# Patient Record
Sex: FEMALE | Race: WHITE | NOT HISPANIC OR LATINO | Employment: OTHER | URBAN - METROPOLITAN AREA
[De-identification: names, ages, dates, MRNs, and addresses within clinical notes are randomized per-mention and may not be internally consistent; named-entity substitution may affect disease eponyms.]

---

## 2022-02-21 ENCOUNTER — TRANSFERRED RECORDS (OUTPATIENT)
Dept: HEALTH INFORMATION MANAGEMENT | Facility: CLINIC | Age: 72
End: 2022-02-21
Payer: COMMERCIAL

## 2022-02-22 NOTE — TELEPHONE ENCOUNTER
FUTURE VISIT INFORMATION      FUTURE VISIT INFORMATION:    Date: 3/22/22    Time: 12:45pm    [place] [facility] [place]:    Referring providers clinic:  [facility]    Reason for visit/diagnosis  sebaceous carcinoma    RECORDS REQUESTED FROM:       Clinic name Comments Records Status Imaging Status   [facility] Request for recs sent 2/22- recs received and sent to [facility] OV/notes 10/5/16-4/16/21  Excisional biopsy left eyelid done./ PAth report 3/1/21 Care Everywhere

## 2022-02-25 ENCOUNTER — TELEPHONE (OUTPATIENT)
Dept: OPHTHALMOLOGY | Facility: CLINIC | Age: 72
End: 2022-02-25
Payer: COMMERCIAL

## 2022-02-25 NOTE — TELEPHONE ENCOUNTER
Spoke with patient regarding scheduling for a sooner appointment in the next couple weeks in a new or JAY spot with either Provider. Patient declined scheduling as offered as she needs one week to arrange transportation and cannot make a 7:45am appointment work. Patient is aware of a call on Monday if I can find another option for patient.-Per Patient

## 2022-02-25 NOTE — TELEPHONE ENCOUNTER
Spoke with patient regarding scheduling for a sooner appointment in the next couple weeks in a new or JAY spot with either Provider. Patient declined scheduling as offered as she needs one week to arrange transportation and cannot make a 7:45am appointment work. Scheduled patient at [facility] on 3/7/22 in the afternoon and kept original appointment in place as well due to patient still has to arrange Transportation. Patient will call on Monday if patient can make appointment on 3/7/22 work. Patient has direct number for call back.-Per Patient

## 2022-02-28 ENCOUNTER — TELEPHONE (OUTPATIENT)
Dept: OPHTHALMOLOGY | Facility: CLINIC | Age: 72
End: 2022-02-28
Payer: COMMERCIAL

## 2022-02-28 NOTE — TELEPHONE ENCOUNTER
Patient called to confirm date for Eye Appointment and possible Biopsy. Patient kept appointment on 3/22/22 due to Biopsy cannot be done at Peds Clinic after RN for Eye Clinic reviewed notes. Peds Appointment was cancelled and 3/22/22 appointment letter to was sent to updated address for patient.-Per Patient

## 2022-03-22 ENCOUNTER — PRE VISIT (OUTPATIENT)
Dept: OPHTHALMOLOGY | Facility: CLINIC | Age: 72
End: 2022-03-22

## 2022-03-22 ENCOUNTER — OFFICE VISIT (OUTPATIENT)
Dept: OPHTHALMOLOGY | Facility: CLINIC | Age: 72
End: 2022-03-22
Payer: COMMERCIAL

## 2022-03-22 VITALS — WEIGHT: 135 LBS | HEIGHT: 61 IN | BODY MASS INDEX: 25.49 KG/M2

## 2022-03-22 DIAGNOSIS — H02.9 EYELID LESION: Primary | ICD-10-CM

## 2022-03-22 DIAGNOSIS — H11.9 CONJUNCTIVAL LESION: ICD-10-CM

## 2022-03-22 PROCEDURE — 68100 BIOPSY CONJUNCTIVA: CPT | Mod: LT | Performed by: OPHTHALMOLOGY

## 2022-03-22 PROCEDURE — 88305 TISSUE EXAM BY PATHOLOGIST: CPT | Mod: TC | Performed by: OPHTHALMOLOGY

## 2022-03-22 PROCEDURE — 67810 INCAL BX EYELID SKN LID MRGN: CPT | Mod: E1 | Performed by: OPHTHALMOLOGY

## 2022-03-22 PROCEDURE — 99204 OFFICE O/P NEW MOD 45 MIN: CPT | Mod: 25 | Performed by: OPHTHALMOLOGY

## 2022-03-22 PROCEDURE — 92285 EXTERNAL OCULAR PHOTOGRAPHY: CPT | Performed by: OPHTHALMOLOGY

## 2022-03-22 PROCEDURE — 88305 TISSUE EXAM BY PATHOLOGIST: CPT | Mod: 26 | Performed by: OPHTHALMOLOGY

## 2022-03-22 RX ORDER — ACETAMINOPHEN 325 MG/1
325-650 TABLET ORAL
COMMUNITY
End: 2022-09-30

## 2022-03-22 RX ORDER — AMLODIPINE BESYLATE 5 MG/1
1 TABLET ORAL DAILY
COMMUNITY
Start: 2021-02-06 | End: 2022-07-12

## 2022-03-22 RX ORDER — ERYTHROMYCIN 5 MG/G
OINTMENT OPHTHALMIC ONCE
Status: COMPLETED | OUTPATIENT
Start: 2022-03-22 | End: 2022-03-22

## 2022-03-22 RX ORDER — MULTIPLE VITAMINS W/ MINERALS TAB 9MG-400MCG
1 TAB ORAL
COMMUNITY
End: 2022-09-30

## 2022-03-22 RX ADMIN — ERYTHROMYCIN 1 G: 5 OINTMENT OPHTHALMIC at 14:08

## 2022-03-22 ASSESSMENT — TONOMETRY
OD_IOP_MMHG: 21
OS_IOP_MMHG: 19
IOP_METHOD: ICARE

## 2022-03-22 ASSESSMENT — CONF VISUAL FIELD
OD_SUPERIOR_NASAL_RESTRICTION: 3
OD_SUPERIOR_TEMPORAL_RESTRICTION: 3
OS_SUPERIOR_TEMPORAL_RESTRICTION: 3
OS_SUPERIOR_NASAL_RESTRICTION: 3

## 2022-03-22 ASSESSMENT — SLIT LAMP EXAM - LIDS: COMMENTS: NORMAL

## 2022-03-22 ASSESSMENT — VISUAL ACUITY
OS_CC: 20/150
OD_CC: 20/70
OD_PH_CC: 20/50
OD_CC+: -2
CORRECTION_TYPE: GLASSES
OS_PH_CC: 20/125
METHOD: SNELLEN - LINEAR

## 2022-03-22 NOTE — PROGRESS NOTES
Chief Complaint(s) and History of Present Illness(es)     Consult For     Laterality: both eyes    Associated symptoms: redness, discharge and swelling.  Negative for eye   pain    Treatments tried: no treatments    Pain scale: 0/10    Comments: Evaluation of bump on SOHEILA and possible biopsy to rule out any   malignancy as welll as address as Ptosis.               Comments     Patient reports bump on SOHEILA has been present for 2+ years here to rule   out concerns of sebaceous Carcinoma. Lids were intermittently inflamed   over this time. The past 2 month lid have gotten to a point that she is no   longer able to open left eye with drooping. Feel like stress may have   contributed to this. Wakes with a film of mucus left eye upon waking at   times that needs to be clear also the past few month. Does feel dry left   eye.   Dry eye left eye and itching with left eye at times.  Has redness with each eye and light sensitivity feel related to Cataracts   each eye. Has had iritis left eye in back in 2014.  She reports that she feel like she has some excess skin with right eye but   does not feel like it impedes her vision and is mostly concerned with   addressing issues with left eye.     Tyra Howard, COT COT 12:57 PM March 22, 2022     POHx:  Recurrent erosion right eye 1992  Possible iritis 2014 left eye, though she thought prednisolone made it worse, then stopped it and it improved.   Chronic dry eye let eye.      No oncologic treatment  No immunosuppressants    Assessment & Plan     Jessica Zamorano is a 71 year old female with the following diagnoses:   Encounter Diagnoses   Name Primary?     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 With the yellow deposits and involvement of conjunctiva throughout, sebacous cell carcinoma is high on the differential.    A biopsy was obtained  today. Discussed pending pathology result she will need multidisciplinary care likely involving cornea service, and potentially head and neck cancer team. We can consider systemic workup once we have pathology results.       Patient was brought to the minor room and placed supine on the table.  The left upper and lower eyelid were infiltrated with local anesthetic consisting of 1% lidocaine with epinephrine.  She was prepped and draped in typical sterile fashion.  Attention was first directed to the left upper eyelid margin the lesion was elevated with a toothed forceps and it was excised with Clare scissors.  While the majority of the upper eyelid margin is thickened, nasally there was a focal protuberant portion, and this portion was excised.  Hemostasis was obtained with thermal cautery.  Attention was directed to the left lower eyelid nodular lesion the lid was everted with direct traction and a 15 blade was used to incise the superior portion of the lesion on the conjunctival side and then thickened tissue was excised with Clare scissors.  This was sent separately in formalin.  Hemostasis was achieved with thermal cautery.  Erythromycin ophthalmic ointment was applied.  She tolerated the procedure well.  I was present and performed the entire procedure. 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 This was not disclosed to me at her initial appointment on 3/22/2022. She may have been lost to follow up as no further notes are found after the biopsy results.     She has extensive disease involving much of her conjunctiva, and extending onto the superior 1/3rd of her cornea. I have left her a voicemail letting her  know I will refer her to our head and neck oncology team and tumor board. Given the extent of the disease, she will need evaluation for metastatic disease, and local control would only be achievable with orbital exenteration. We had discussed this possibility at her initial visit, and she did express that she would be reluctant to proceed with exenteration.      Patient disposition:   No follow-ups on file.        Attending Physician Attestation: Complete documentation of historical and exam elements from today's encounter can be found in the full encounter summary report (not reduplicated in this progress note). I personally obtained the chief complaint(s) and history of present illness. I confirmed and edited as necessary the review of systems, past medical/surgical history, family history, social history, and examination findings as documented by others; and I examined the patient myself. I personally reviewed the relevant tests, images, and reports as documented above. I formulated and edited as necessary the assessment and plan and discussed the findings and management plan with the patient.  -Fabien Orozco MD

## 2022-03-22 NOTE — NURSING NOTE
Chief Complaints and History of Present Illnesses   Patient presents with     Consult For     Evaluation of bump on SOHEILA and possible biopsy to rule out any malignancy as welll as address as Ptosis.      Chief Complaint(s) and History of Present Illness(es)     Consult For     Laterality: both eyes    Associated symptoms: redness, discharge and swelling.  Negative for eye pain    Treatments tried: no treatments    Pain scale: 0/10    Comments: Evaluation of bump on SOHEILA and possible biopsy to rule out any malignancy as welll as address as Ptosis.               Comments     Patient reports bump on SOHEILA has been present for 2+ years here to rule out concerns of sebaceous Carcinoma. Lids were intermittently inflamed over this time. The past 2 month lid have gotten to a point that she is no longer able to open left eye with drooping. Feel like stress may have contributed to this. Wakes with a film of mucus left eye upon waking at times that needs to be clear also the past few month. Does feel dry left eye.   Dry eye left eye and itching with left eye at times.  Has redness with each eye and light sensitivity feel related to Cataracts each eye. Has had iritis left eye in back in 2014.  She reports that she feel like she has some excess skin with right eye but does not feel like it impedes her vision and is mostly concerned with addressing issues with left eye.     Tyra Howard, COT COT 12:57 PM March 22, 2022

## 2022-03-22 NOTE — LETTER
3/22/2022         RE:  :  MRN: Jessica Zamorano  1950  [id number]     Dear Dr. Christina,    Thank you for asking me to see your patient, Jessica Zamorano, for an oculoplastic   consultation.  My assessment and plan are below.  For further details, please see my attached clinic note.      Chief Complaint(s) and History of Present Illness(es)     Consult For     Laterality: both eyes    Associated symptoms: redness, discharge and swelling.  Negative for eye   pain    Treatments tried: no treatments    Pain scale: 0/10    Comments: Evaluation of bump on SOHEILA and possible biopsy to rule out any   malignancy as welll as address as Ptosis.               Comments     Patient reports bump on SOHEILA has been present for 2+ years here to rule   out concerns of sebaceous Carcinoma. Lids were intermittently inflamed   over this time. The past 2 month lid have gotten to a point that she is no   longer able to open left eye with drooping. Feel like stress may have   contributed to this. Wakes with a film of mucus left eye upon waking at   times that needs to be clear also the past few month. Does feel dry left   eye.   Dry eye left eye and itching with left eye at times.  Has redness with each eye and light sensitivity feel related to Cataracts   each eye. Has had iritis left eye in back in .  She reports that she feel like she has some excess skin with right eye but   does not feel like it impedes her vision and is mostly concerned with   addressing issues with left eye.     Tyra Howard, COT COT 12:57 PM 2022     POHx:  Recurrent erosion right eye 1992  Possible iritis 2014 left eye, though she thought prednisolone made it worse, then stopped it and it improved.   Chronic dry eye let eye.      No oncologic treatment  No immunosuppressants    Assessment & Plan     Jessica Zamorano is a 71 year old female with the following diagnoses:   Encounter Diagnoses   Name Primary?     Eyelid lesion Yes     Conjunctival lesion       Left conjunctival and lid margin lesion concerning for ocular surface neoplasia - RANCHO, Squamous cell carimona, sebaceous cell carcinoma. With the yellow deposits and involvement of conjunctiva throughout, sebacous cell carcinoma is high on the differential.    A biopsy was obtained today. Discussed pending pathology result she will need multidisciplinary care likely involving cornea service, and potentially head and neck cancer team. We can consider systemic workup once we have pathology results.       Patient was brought to the minor room and placed supine on the table.  The left upper and lower eyelid were infiltrated with local anesthetic consisting of 1% lidocaine with epinephrine.  She was prepped and draped in typical sterile fashion.  Attention was first directed to the left upper eyelid margin the lesion was elevated with a toothed forceps and it was excised with Clare scissors.  While the majority of the upper eyelid margin is thickened, nasally there was a focal protuberant portion, and this portion was excised.  Hemostasis was obtained with thermal cautery.  Attention was directed to the left lower eyelid nodular lesion the lid was everted with direct traction and a 15 blade was used to incise the superior portion of the lesion on the conjunctival side and then thickened tissue was excised with Clare scissors.  This was sent separately in formalin.  Hemostasis was achieved with thermal cautery.  Erythromycin ophthalmic ointment was applied.  She tolerated the procedure well.  I was present and performed the entire procedure. Fabien Orozco MD   Patient disposition:   No follow-ups on file.         Again, thank you for allowing me to participate in the care of your patient.      Sincerely,    Fabien Orozco MD  Department of Ophthalmology and Visual Neurosciences  [facility]    CC: Dane Christina MD  [facility]  [address]  [facility] [address]  [facility] [facility] [address]  Via  Fax: [phone]

## 2022-03-24 LAB
PATH REPORT.COMMENTS IMP SPEC: NORMAL
PATH REPORT.COMMENTS IMP SPEC: NORMAL
PATH REPORT.FINAL DX SPEC: NORMAL
PATH REPORT.GROSS SPEC: NORMAL
PATH REPORT.MICROSCOPIC SPEC OTHER STN: NORMAL
PATH REPORT.RELEVANT HX SPEC: NORMAL
PHOTO IMAGE: NORMAL

## 2022-03-25 ENCOUNTER — TELEPHONE (OUTPATIENT)
Dept: OTOLARYNGOLOGY | Facility: CLINIC | Age: 72
End: 2022-03-25
Payer: COMMERCIAL

## 2022-03-25 DIAGNOSIS — C44.131 SEBACEOUS CELL CARCINOMA OF SKIN OF EYELID, INCLUDING CANTHUS: Primary | ICD-10-CM

## 2022-03-25 DIAGNOSIS — C44.1091: ICD-10-CM

## 2022-03-25 NOTE — TELEPHONE ENCOUNTER
Spoke with patient regarding scheduling with  and  for Evaluation with tumor board for: Sebaceous cell. -Per . Scheduled patient accordingly for ENT Visit and Pet Scan scheduled for 3/31/22 and MRI scheduled for 4/12/22. Sent appointment information to confirmed address for patient.-Per Patient

## 2022-03-25 NOTE — TELEPHONE ENCOUNTER
FUTURE VISIT INFORMATION      FUTURE VISIT INFORMATION:    Date: 4/1/22    Time: 2:20PM    Location: [facility]  REFERRAL INFORMATION:    Referring provider:  Fabien Orozco MD    Referring providers clinic:  [facility]     Reason for visit/diagnosis  Evaluation with tumor board.-Per  Pet Scan scheduled for 3/31/22. MRI scheduled for 4/12/22.-Ok NB-Appt Per PT    RECORDS REQUESTED FROM:       Clinic name Comments Records Status Imaging Status    [facility]  3/22/22 note from Fabien Orozco MD   [facility] 3/22/22 eyelid (Case: [id number]  ) [facility] imaging  4/7/21 MR orbit Care everywhere  req 3/25/22 - PACS                       3/25/22 3:22PM sent a fax to [place] for images - Amay   3/28/22 11:03AM called [place], someone will try to work on request today for images - Amay   *image received in PACS - Amay

## 2022-03-30 DIAGNOSIS — C44.1091: ICD-10-CM

## 2022-03-30 DIAGNOSIS — C44.131 SEBACEOUS CELL CARCINOMA OF SKIN OF EYELID, INCLUDING CANTHUS: Primary | ICD-10-CM

## 2022-03-31 ENCOUNTER — HOSPITAL ENCOUNTER (OUTPATIENT)
Dept: PET IMAGING | Facility: CLINIC | Age: 72
Discharge: HOME | End: 2022-03-31
Attending: OTOLARYNGOLOGY | Admitting: OTOLARYNGOLOGY
Payer: COMMERCIAL

## 2022-03-31 DIAGNOSIS — C44.131 SEBACEOUS CELL CARCINOMA OF SKIN OF EYELID, INCLUDING CANTHUS: ICD-10-CM

## 2022-03-31 DIAGNOSIS — C44.1091: ICD-10-CM

## 2022-03-31 PROCEDURE — A9552 F18 FDG: HCPCS | Performed by: OTOLARYNGOLOGY

## 2022-03-31 PROCEDURE — 343N000001 HC RX 343: Performed by: OTOLARYNGOLOGY

## 2022-03-31 PROCEDURE — 78815 PET IMAGE W/CT SKULL-THIGH: CPT | Mod: PI

## 2022-03-31 PROCEDURE — 78815 PET IMAGE W/CT SKULL-THIGH: CPT | Mod: 26

## 2022-03-31 RX ADMIN — FLUDEOXYGLUCOSE F-18 10.11 MCI.: 500 INJECTION, SOLUTION INTRAVENOUS at 14:17

## 2022-04-01 ENCOUNTER — OFFICE VISIT (OUTPATIENT)
Dept: OTOLARYNGOLOGY | Facility: CLINIC | Age: 72
End: 2022-04-01
Payer: COMMERCIAL

## 2022-04-01 ENCOUNTER — PRE VISIT (OUTPATIENT)
Dept: OTOLARYNGOLOGY | Facility: CLINIC | Age: 72
End: 2022-04-01

## 2022-04-01 VITALS — WEIGHT: 135 LBS | HEIGHT: 61 IN | BODY MASS INDEX: 25.49 KG/M2

## 2022-04-01 DIAGNOSIS — C44.131 SEBACEOUS CELL CARCINOMA OF SKIN OF EYELID, INCLUDING CANTHUS: Primary | ICD-10-CM

## 2022-04-01 PROCEDURE — 99205 OFFICE O/P NEW HI 60 MIN: CPT | Performed by: OTOLARYNGOLOGY

## 2022-04-01 PROCEDURE — 99205 OFFICE O/P NEW HI 60 MIN: CPT | Performed by: RADIOLOGY

## 2022-04-01 RX ORDER — DIAZEPAM 5 MG
TABLET ORAL
Qty: 2 TABLET | Refills: 0 | Status: SHIPPED | OUTPATIENT
Start: 2022-04-01 | End: 2022-07-12

## 2022-04-01 ASSESSMENT — PAIN SCALES - GENERAL: PAINLEVEL: NO PAIN (0)

## 2022-04-01 NOTE — PROGRESS NOTES
Dear Dr. Orozco:    I had the pleasure of meeting Jessica Zamorano in consultation today at the [facility] at your request.     History of Present Illness:   Jessica Zamorano is a 71 year old woman who is referred for evaluation of a sebaceous cell carcinoma of the eyelid.     [facility]. She had a biopsy performed which was consistent with sebaceous cell carcinoma.    She previously underwent evaluation at the [facility] in 2021.She underwent mapping biopsies of the tumor with plans for surgery.  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 She was recommended for PET scan and orbital exenteration. She stated that she was not willing to have the imaging done or proceed with surgery or any further appointments. She was lost to follow-up after the biopsy, last seen in April 2021.    She saw Dr Orozco on 3/22/2022 for evaluation. At that time there was concern for a lesion involving the lid, conjunctiva, and cornea. A biopsy was performed which was consistent with sebaceous cell carcinoma.  It was determined that given the extent of disease orbit sparing would not be possible.     She had a PET scan performed yesterday to evaluate for metastatic disease which showed only the local disease.    She feels like her eye is largely the same.  She feels like it fluctuates as far as dryness, a bit more dry today.  She says that she is able to see out of the eye but it is not particularly good.  She feels like she would probably be limited with reading on that side.  She does note that the vision is blurry.  She does not feel like her vision is changed over the last 2 years.  She has no pain associated with the eye.  She has some epiphora.  She denies any facial numbness.  She has no other masses in her neck or parotid.        Past medical history: hypertension, sebaceous cell carcinoma    Past surgical  history: eye biopsies, tonsillectomy, marsupialization of Bartholin gland    Social history: No smoking, no chewing tobacco, rare alcohol once per month. Retired - works seasonally at [employer]. Live alone. Like to journal, spiritual studies, walk, weight lifting, arts, spend time with friends, reading.     Family history: negative for sebaceous cancer, dad with lung cance    MEDICATIONS:     Current Outpatient Medications   Medication Sig Dispense Refill     acetaminophen (TYLENOL) 325 MG tablet Take 325-650 mg by mouth       amLODIPine (NORVASC) 5 MG tablet Take 1 tablet by mouth daily       multivitamin w/minerals (MULTIVITAMINS W/MINERALS) tablet Take 1 tablet by mouth         ALLERGIES:    Allergies   Allergen Reactions     Hyaluronan Other (See Comments)     Stinging in eye      Prednisolone Other (See Comments)     Extreme dryness in eye  Extreme dryness in eye  Extreme dryness in eye  Extreme dryness in eye       Sodium Hyaluronate Other (See Comments)     Stinging in eye      Dexamethasone Rash     Metronidazole Rash     Povidone Iodine Rash     Tobramycin Rash       HABITS/SOCIAL HISTORY:   No smoking, no chewing tobacco, rare alcohol once per month.   Retired - works seasonally at [employer].   Live alone.   Like to journal, spiritual studies, walk, weight lifting, arts, spend time with friends, reading.     Social History     Socioeconomic History     Marital status: Single     Spouse name: Not on file     Number of children: Not on file     Years of education: Not on file     Highest education level: Not on file   Occupational History     Not on file   Tobacco Use     Smoking status: Never Smoker     Smokeless tobacco: Never Used   Substance and Sexual Activity     Alcohol use: Not on file     Drug use: Not on file     Sexual activity: Not on file   Other Topics Concern     Not on file   Social History Narrative     Not on file     Social Determinants of Health     Financial Resource Strain: Not on file   Food  "Insecurity: Not on file   Transportation Needs: Not on file   Physical Activity: Not on file   Stress: Not on file   Social Connections: Not on file   Intimate Partner Violence: Not on file   Housing Stability: Not on file       PAST MEDICAL HISTORY:   Past Medical History:   Diagnosis Date     Hypertension         PAST SURGICAL HISTORY: No past surgical history on file.    FAMILY HISTORY:    Family History   Problem Relation Age of Onset     Glaucoma No family hx of      Macular Degeneration No family hx of        REVIEW OF SYSTEMS:  12 point ROS was negative other than the symptoms noted above in the HPI.  Patient Supplied Answers to Review of Systems  [facility] ROS 4/1/2022   Ears, Nose, Throat Ear pain, Ringing/noise in ears, Nasal congestion or drainage         PHYSICAL EXAMINATION:   Ht 1.549 m (5' 1\")   Wt 61.2 kg (135 lb)   BMI 25.51 kg/m     Appearance:   normal; NAD, age-appropriate appearance, well-developed, normal habitus   Communication:   normal; communicates verbally, normal voice quality   Head/Face:   inspection -  Normal; no scars or visible lesions   Palpation -no facial numbness   Salivary glands -  Normal size, no tenderness, swelling, or palpable masses   Facial strength -  Normal and symmetric bilateral; H/B I/VI   Skin:  normal, no rash   Eyes:  normal occular movements  Difficulty with complete eye opening on the left  Some dried drainage  Decreased vision on the left   Ears:  auricle (AD) -  normal  EAC (AD) -  normal  TM (AD) -  Normal, no effusion  auricle (AS) -  normal  EAC (AS) -  normal  TM (AS) -  Normal, no effusion  Normal clinical speech reception   Nose:  Ext. inspection -  Normal   Oral Cavity:  lips -  Normal mucosa, oral competence, and stoma size   Age-appropriate dentition, healthy gingival mucosa   Hard palate, buccal, floor of mouth mucosa normal   Tongue - normal movement, no lesions   Neck: No visible mass or asymmetry   Normal range of motion   Lymphatic:  no abnormal " nodes   Cardiovascular:  warm, pink, well-perfused extremities without swelling, tenderness, or edema   Respiratory:  Normal respiratory effort, no stridor   Neuro/Psych.:  mood/affect -  normal  mental status -  normal  cranial nerves -  normal with exception of decreased visual acuity       PROCEDURES:     RESULTS REVIEWED:     Care discussed with Dr Sparks    I independently reviewed the PET scan images    PET report reviewed after visit    [facility] consult from Dr Orozco      IMPRESSION AND PLAN:   Jessica Zamorano is a 71-year-old woman with a longstanding history of sebaceous cell carcinoma that has gone untreated.  I discussed with her again today that given the extent of involvement the recommendation for definitive management would be with surgical resection which would need to entail an orbital exenteration.  She states that she is not interested in this idea due to the functional and cosmetic consequences.    We discussed alternatives to surgical resection, making clear that these are less likely to be curative in nature.  Dr. Sparks discussed options for radiation including a full course of radiation versus palliative radiation.  However he discussed that this would potentially have poor control of the tumor and leave her with a painful nonfunctioning eye, ultimately requiring orbital exenteration regardless.    We discussed that we could consider sending tumor off for sequencing to see if there is any targeted therapies that she could receive.  We discussed that she may need additional biopsies performed depending on how much tissue we have available for testing.    We discussed alternatively that she can choose to do nothing.  The tumor would likely continue to grow although the pace at which it would grow is unclear.  She may have metastatic spread.  If she elected for observation we would recommend repeat imaging.    She states very clearly that  she is not interested in surgery and is open to some of the other ideas.  She was encouraged to take some time to think about things.  She is scheduled for an MRI in about a week which I would like her to complete.  She was given our contact information to let us know when she makes a decision.  We have not heard from her we will reach out to her.      Of note she does state that one of the issues that she had was feeling like she was being pressured into treatment/surgery and we discussed that certainly while surgery is the definitive management of this tumor we are in support of her choosing what ever is best for her overall quality of life and we will support her with her decisions.    We will leave follow-up open-ended pending her scan results and her making a decision about how she wishes to proceed with treatment.  We will plan on reviewing her case at tumor conference on Friday.    Thank you very much for the opportunity to participate in the care of your patient.      Mesha Eubanks MD  Otolaryngology- Head & Neck Surgery      This note was dictated with voice recognition software and then edited. Please excuse any unintentional errors.         CC:  Fabien Orozco MD  Department of Ophthalmology  [facility]      Ray Sparks MD  Department of Radiation Oncology  [facility]

## 2022-04-01 NOTE — LETTER
2022      RE: Jessica Zamorano  [address]  [address]  [facility] [address]          [place] [address]  [phone]       Consultation Note    Name: Jessica Zamorano MRN: [id number]   : 1950   Date of Service: 2022  Referring: Dr. 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    2021: Mapping biopsies performed at [facility] demonstrate extensive disease throughout the left orbit. She was recommended to undergo an orbital exenteration which she refused.    3/22/2022: Consultation with Dr. Orozco at the [facility]. Repeat biopsy (specimen: [id number]) of the left lateral lower eyelid conjunctiva and nasal aspect of the left upper eyelid reveal an invasive sebaceous carcinoma.    3/31/2022: Staging PET/CT demonstrates diffuse FDG uptake involving the left lid and globe with a small amount of FDG uptake along the right medial canthus. There is no cervical adenopathy or distant metastatic disease appreciated.    Past Medical History:    HTN    Past Surgical History:    Tonsillectomies    Marsupialization of Bartholin gland    Chemotherapy History:  None    Radiation History:  None    Pregnant: No  Implanted Cardiac Devices: No    Medications:  Current Outpatient Medications   Medication Sig Dispense Refill     acetaminophen (TYLENOL) 325 MG tablet Take 325-650 mg by mouth       amLODIPine (NORVASC) 5 MG tablet Take 1 tablet by mouth daily       diazepam (VALIUM) 5 MG tablet Take 1 tablet 30 minutes prior to scan, repeat X1 if needed 2 tablet 0     multivitamin w/minerals (MULTIVITAMINS W/MINERALS) tablet Take 1 tablet by mouth         Allergies:    Tobramycin    Povidone iodine    Metronidazole    Dexamethasone    Sodium hyaluronate    Prednisolone    Hyaluronan    Social History:  Tobacco: Never smoker  Alcohol: Rare  Employment: Retired  Lives in [place], [place]    Family History:    Father: lung cancer    Review of Systems   A 10-point review of systems was performed. Pertinent positives include:     Blurred vision on the left    Mild dryness of the left eye    Physical Exam   ECOG Status: 0    Vitals:  Weight: 61.2 kg  Pain: 0/10    General: Healthy-appearing 71-year-old female seated comfortably in an examination chair in no acute distress  HEENT: NC/AT. Moderate ptosis of the left eye. EOMI. There is a small amount of nodularity involving the conjunctiva of the lower lid. EACs clear bilaterally with normal-appearing TMs. Moist mucus membranes. No visible oral cavity lesions.  Pulmonary: No wheezing, stridor or respiratory distress  Skin: Normal color and turgor.    Imaging/Path/Labs   Imaging: Reviewed    Path: Reviewed    Labs: No recent labs    Assessment    Ms. 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    Plan   Dr. Eubanks and I led a long discussion with Ms. Zamorano regarding management of her left orbital disease. We concurred with the previous recommendations she was provided at the [facility] that the best chance of cure of her disease would be orbital exenteration. Ms. Zamorano, though, was adamantly opposed to any intervention which would result in removal of the left eye/loss of vision and stated that she did not want to consider such options at this time.     As alternatives to surgery, I discussed possible radiotherapy options including definitive-intent and palliative radiation to the left orbit. In short, I explained that curative-intent radiation doses to the anterior orbital contents would most likely leave her with a nonfunctional eye and likely have less robust local control  as compared with surgical resection. Additionally, while lower dose palliative radiation therapy could potentially temporarily halt tumor progression while placing her vision at less risk of radiation toxicity, I would not expect this to be curative nature as she would likely have disease progression within the left orbit at some point in the future.     We also discussed continued close observation versus performing next generation sequencing of her tumor to see if there would be any potential targetable mutations amenable to systemic therapy. We explained that neither of these options would be curative in nature however they would align with Ms. Zamorano's goals of not losing her left eye in the immediate future. Ms. Zamorano indicated that she wished to think more about the options presented to her before committing to any plan of care moving forward. In the interim, Dr. Eubanks has ordered a MRI for further delineation of her left orbital tumor and we will plan to discuss her case at our upcoming head and neck tumor board on Friday, 4/8/2022.    Ray Sparks MD/PhD    Dept of Radiation Oncology  [facility]

## 2022-04-01 NOTE — LETTER
4/1/2022       RE: Jessica Zamorano  [address]  [address]  [facility] [address]     Dear Colleague,    Thank you for referring your patient, Jessica Zamorano, to the [facility] [place] at [facility]. Please see a copy of my visit note below.    Dear Dr. Orozco:    I had the pleasure of meeting Jessica Zamorano in consultation today at the [facility] at your request.     History of Present Illness:   Jessica Zamorano is a 71 year old woman who is referred for evaluation of a sebaceous cell carcinoma of the eyelid.     She was seen initially in the [facility] back in 2021. She had a biopsy performed which was consistent with sebaceous cell carcinoma.    She previously underwent evaluation at the [facility] in 2021.She underwent mapping biopsies of the tumor with plans for surgery.  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 She was recommended for PET scan and orbital exenteration. She stated that she was not willing to have the imaging done or proceed with surgery or any further appointments. She was lost to follow-up after the biopsy, last seen in April 2021.    She saw Dr Orozco on 3/22/2022 for evaluation. At that time there was concern for a lesion involving the lip, conjunctiva, and cornea. A biopsy was performed which was consistent with sebaceous cell carcinoma.  It was determined that given the extent of disease orbit sparing would not be possible.     She had a PET scan performed yesterday to evaluate for metastatic disease which showed only the local disease.    She feels like her eye is largely the same.  She feels like it fluctuates as far as dryness, a bit more dry today.  She says that she is able to see out of the eye but it is not particularly good.  She feels like she would probably be limited with reading on that side.  She does note  that the vision is blurry.  She does not feel like her vision is changed over the last 2 years.  She has no pain associated with the eye.  She has some epiphora.  She denies any facial numbness.  She has no other masses in her neck or parotid.        Past medical history: hypertension, sebaceous cell carcinoma    Past surgical history: eye biopsies, tonsillectomy, marsupialization of Bartholin gland    Social history: No smoking, no chewing tobacco, rare alcohol once per month. Retired - works seasonally at [employer]. Live alone. Like to [employer], [employer] studies, walk, weight lifting, arts, spend time with friends, reading.     Family history: negative for sebaceous cancer, dad with lung cance    MEDICATIONS:     Current Outpatient Medications   Medication Sig Dispense Refill     acetaminophen (TYLENOL) 325 MG tablet Take 325-650 mg by mouth       amLODIPine (NORVASC) 5 MG tablet Take 1 tablet by mouth daily       multivitamin w/minerals (MULTIVITAMINS W/MINERALS) tablet Take 1 tablet by mouth         ALLERGIES:    Allergies   Allergen Reactions     Hyaluronan Other (See Comments)     Stinging in eye      Prednisolone Other (See Comments)     Extreme dryness in eye  Extreme dryness in eye  Extreme dryness in eye  Extreme dryness in eye       Sodium Hyaluronate Other (See Comments)     Stinging in eye      Dexamethasone Rash     Metronidazole Rash     Povidone Iodine Rash     Tobramycin Rash       HABITS/SOCIAL HISTORY:   No smoking, no chewing tobacco, rare alcohol once per month.   Retired - works seasonally at [employer].   Live alone.   Like to [employer], [employer] studies, walk, weight lifting, arts, spend time with friends, reading.     Social History     Socioeconomic History     Marital status: Single     Spouse name: Not on file     Number of children: Not on file     Years of education: Not on file     Highest education level: Not on file   Occupational History     Not on file   Tobacco Use     Smoking status: Never  "Smoker     Smokeless tobacco: Never Used   Substance and Sexual Activity     Alcohol use: Not on file     Drug use: Not on file     Sexual activity: Not on file   Other Topics Concern     Not on file   Social History Narrative     Not on file     Social Determinants of Health     Financial Resource Strain: Not on file   Food Insecurity: Not on file   Transportation Needs: Not on file   Physical Activity: Not on file   Stress: Not on file   Social Connections: Not on file   Intimate Partner Violence: Not on file   Housing Stability: Not on file       PAST MEDICAL HISTORY:   Past Medical History:   Diagnosis Date     Hypertension         PAST SURGICAL HISTORY: No past surgical history on file.    FAMILY HISTORY:    Family History   Problem Relation Age of Onset     Glaucoma No family hx of      Macular Degeneration No family hx of        REVIEW OF SYSTEMS:  12 point ROS was negative other than the symptoms noted above in the HPI.  Patient Supplied Answers to Review of Systems  [facility] ROS 4/1/2022   Ears, Nose, Throat Ear pain, Ringing/noise in ears, Nasal congestion or drainage         PHYSICAL EXAMINATION:   Ht 1.549 m (5' 1\")   Wt 61.2 kg (135 lb)   BMI 25.51 kg/m     Appearance:   normal; NAD, age-appropriate appearance, well-developed, normal habitus   Communication:   normal; communicates verbally, normal voice quality   Head/Face:   inspection -  Normal; no scars or visible lesions   Palpation -no facial numbness   Salivary glands -  Normal size, no tenderness, swelling, or palpable masses   Facial strength -  Normal and symmetric bilateral; H/B I/VI   Skin:  normal, no rash   Eyes:  normal occular movements  Difficulty with complete eye opening on the left  Some dried drainage  Decreased vision on the left   Ears:  auricle (AD) -  normal  EAC (AD) -  normal  TM (AD) -  Normal, no effusion  auricle (AS) -  normal  EAC (AS) -  normal  TM (AS) -  Normal, no effusion  Normal clinical speech reception   Nose:  " Ext. inspection -  Normal   Oral Cavity:  lips -  Normal mucosa, oral competence, and stoma size   Age-appropriate dentition, healthy gingival mucosa   Hard palate, buccal, floor of mouth mucosa normal   Tongue - normal movement, no lesions   Neck: No visible mass or asymmetry   Normal range of motion   Lymphatic:  no abnormal nodes   Cardiovascular:  warm, pink, well-perfused extremities without swelling, tenderness, or edema   Respiratory:  Normal respiratory effort, no stridor   Neuro/Psych.:  mood/affect -  normal  mental status -  normal  cranial nerves -  normal with exception of decreased visual acuity       PROCEDURES:     RESULTS REVIEWED:     Care discussed with Dr Sparks    I independently reviewed the PET scan images    PET report reviewed after visit    I reviewed multiple notes from [facility] and [facility], consult from Dr Orozco      IMPRESSION AND PLAN:   Jessica Zamorano is a 71-year-old woman with a longstanding history of sebaceous cell carcinoma that has gone untreated.  I discussed with her again today that given the extent of involvement the recommendation for definitive management would be with surgical resection which would need to entail an orbital exenteration.  She states that she is not interested in this idea due to the functional and cosmetic consequences.    We discussed alternatives to surgical resection, making clear that these are less likely to be curative in nature.  Dr. Sparks discussed options for radiation including a full course of radiation versus palliative radiation.  However he discussed that this would potentially have poor control of the tumor and leave her with a painful nonfunctioning eye, ultimately requiring orbital exenteration regardless.    We discussed that we could consider sending tumor off for sequencing to see if there is any targeted therapies that she could receive.  We discussed that she may need additional biopsies performed depending on how much  tissue we have available for testing.    We discussed alternatively that she can choose to do nothing.  The tumor would likely continue to grow although the pace at which it would grow is unclear.  She may have metastatic spread.  If she elected for observation we would recommend repeat imaging.    She states very clearly that she is not interested in surgery and is open to some of the other ideas.  She was encouraged to take some time to think about things.  She is scheduled for an MRI in about a week which I would like her to complete.  She was given our contact information to let us know when she makes a decision.  We have not heard from her we will reach out to her.      Of note she does state that one of the issues that she had was feeling like she was being pressured into treatment/surgery and we discussed that certainly while surgery is the definitive management of this tumor we are in support of her choosing what ever is best for her overall quality of life and we will support her with her decisions.    We will leave follow-up open-ended pending her scan results and her making a decision about how she wishes to proceed with treatment.  We will plan on reviewing her case at tumor conference on Friday.    Thank you very much for the opportunity to participate in the care of your patient.      Mesha Eubanks MD  Otolaryngology- Head & Neck Surgery      This note was dictated with voice recognition software and then edited. Please excuse any unintentional errors.         CC:  Fabien Orozco MD  Department of Ophthalmology  [facility]      Ray Sparks MD  Department of Radiation Oncology  [facility]

## 2022-04-01 NOTE — NURSING NOTE
"Chief Complaint   Patient presents with     Consult     tumor board . Declined BP     Height 1.549 m (5' 1\"), weight 61.2 kg (135 lb).    Thanh Matias LPN    "

## 2022-04-05 NOTE — PROGRESS NOTES
Department of Radiation Oncology  [address] [address]  [phone]       Consultation Note    Name: Jessica Zamorano MRN: [id number]   : 1950   Date of Service: 2022  Referring: Dr. 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    2021: Mapping biopsies performed at [facility] demonstrate extensive disease throughout the left orbit. She was recommended to undergo an orbital exenteration which she refused.    3/22/2022: Consultation with Dr. Orozco at the [facility]. Repeat biopsy (specimen: [id number]) of the left lateral lower eyelid conjunctiva and nasal aspect of the left upper eyelid reveal an invasive sebaceous carcinoma.    3/31/2022: Staging PET/CT demonstrates diffuse FDG uptake involving the left lid and globe with a small amount of FDG uptake along the right medial canthus. There is no cervical adenopathy or distant metastatic disease appreciated.    Past Medical History:    HTN    Past Surgical History:    Tonsillectomies    Marsupialization of Bartholin gland    Chemotherapy History:  None    Radiation History:  None    Pregnant: No  Implanted Cardiac Devices: No    Medications:  Current Outpatient Medications   Medication Sig Dispense Refill     acetaminophen (TYLENOL) 325 MG tablet Take 325-650 mg by mouth       amLODIPine (NORVASC) 5 MG tablet Take 1 tablet by mouth daily       diazepam (VALIUM) 5 MG tablet Take 1 tablet 30 minutes prior to scan, repeat X1 if needed 2 tablet 0     multivitamin w/minerals (MULTIVITAMINS W/MINERALS) tablet Take 1 tablet by mouth        Allergies:    Tobramycin    Povidone iodine    Metronidazole    Dexamethasone    Sodium  hyaluronate    Prednisolone    Hyaluronan    Social History:  Tobacco: Never smoker  Alcohol: Rare  Employment: Retired  Lives in [place], [place]    Family History:    Father: lung cancer    Review of Systems   A 10-point review of systems was performed. Pertinent positives include:     Blurred vision on the left    Mild dryness of the left eye    Physical Exam   ECOG Status: 0    Vitals:  Weight: 61.2 kg  Pain: 0/10    General: Healthy-appearing 71-year-old female seated comfortably in an examination chair in no acute distress  HEENT: NC/AT. Moderate ptosis of the left eye. EOMI. There is a small amount of nodularity involving the conjunctiva of the lower lid. EACs clear bilaterally with normal-appearing TMs. Moist mucus membranes. No visible oral cavity lesions.  Pulmonary: No wheezing, stridor or respiratory distress  Skin: Normal color and turgor.    Imaging/Path/Labs   Imaging: Reviewed    Path: Reviewed    Labs: No recent labs    Assessment    Ms. 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    Plan   Dr. Eubanks and I led a long discussion with Ms. Zamorano regarding management of her left orbital disease. We concurred with the previous recommendations she was provided at the [facility] that the best chance of cure of her disease would be orbital exenteration. Ms. Zamorano, though, was adamantly opposed to any intervention which would result in removal of the left eye/loss of vision and stated that she did not want to consider such options at this time.     As alternatives to surgery, I discussed possible radiotherapy options including definitive-intent and palliative radiation to the left orbit. In short, I explained that curative-intent radiation doses to the anterior orbital contents would most likely leave her with a nonfunctional eye and likely have less robust local control as compared with surgical resection. Additionally, while lower dose palliative radiation  therapy could potentially temporarily halt tumor progression while placing her vision at less risk of radiation toxicity, I would not expect this to be curative nature as she would likely have disease progression within the left orbit at some point in the future.     We also discussed continued close observation versus performing next generation sequencing of her tumor to see if there would be any potential targetable mutations amenable to systemic therapy. We explained that neither of these options would be curative in nature however they would align with Ms. Zamorano's goals of not losing her left eye in the immediate future. Ms. Zamorano indicated that she wished to think more about the options presented to her before committing to any plan of care moving forward. In the interim, Dr. Eubanks has ordered a MRI for further delineation of her left orbital tumor and we will plan to discuss her case at our upcoming head and neck tumor board on Friday, 4/8/2022.    Ray Sparks MD/PhD    Dept of Radiation Oncology  [facility]

## 2022-04-08 ENCOUNTER — TUMOR CONFERENCE (OUTPATIENT)
Dept: ONCOLOGY | Facility: CLINIC | Age: 72
End: 2022-04-08
Payer: COMMERCIAL

## 2022-04-08 NOTE — TUMOR CONFERENCE
,  Head & Neck Tumor Conference Note     Status: New  Staff: Dr. Eubanks    Tumor Site: Skin eyelid    Tumor Pathology: Sebaceous cell  Tumor Stage: ***    Reason for Review: Review imaging, path, and POC    Brief History: Jessica Zamorano is a 71 year old woman who is referred for evaluation of a sebaceous cell carcinoma of the eyelid.      [facility] She had a biopsy performed which was consistent with sebaceous cell carcinoma.     She previously underwent evaluation at the [facility] in 2021.She underwent mapping biopsies of the tumor with plans for surgery.  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 She was recommended for PET scan and orbital exenteration. She stated that she was not willing to have the imaging done or proceed with surgery or any further appointments. She was lost to follow-up after the biopsy, last seen in April 2021.     She saw Dr Orozco on 3/22/2022 for evaluation. At that time there was concern for a lesion involving the lip, conjunctiva, and cornea. A biopsy was performed which was consistent with sebaceous cell carcinoma.  It was determined that given the extent of disease orbit sparing would not be possible.      She had a PET scan performed yesterday to evaluate for metastatic disease which showed only the local disease.     She feels like her eye is largely the same.  She feels like it fluctuates as far as dryness, a bit more dry today.  She says that she is able to see out of the eye but it is not particularly good.  She feels like she would probably be limited with reading on that side.  She does note that the vision is blurry.  She does not feel like her vision is changed over the last 2 years.  She has no pain associated with the eye.  She has some epiphora.  She denies any facial numbness.  She has no other masses in her neck or parotid. She is quite against surgical management.     Pertinent Past  Medical History: HTN    Smoker?   No smoking, no chewing tobacco, rare alcohol once per month. Retired - works seasonally at [employer]. Live alone. Like to journal, spiritual studies, walk, weight lifting, arts, spend time with friends, reading.     Imaging:   IMPRESSION: In this patient with history of sebaceous cell carcinoma  of the left upper eyelid:     1. Left upper eyelid lesion demonstrates hypermetabolism, SUV max 7.33  compared to 4.94 of the contralateral avid.   2. Right medial canthus demonstrates elevated FDG uptake, SUV max  4.94. Visual inspection is recommended.   3. Elevated FDG uptake in the region of the anus, suggesting  hemorrhoids.   4. Right breast mass measuring 12 mm. Mammography should be obtained  for further evaluation.     Pathology:   A. Lateral lower eyelid conjunctiva, left, biopsy: Intraepithelial and invasive sebaceous carcinoma.      B. Nasal upper eyelid margin, left, biopsy: Diffuse intraepithelial involvement of sebaceous carcinoma with likely subepithelial     Tumor Board Recommendation:   Discussion: Reviewed PET CT - no focal lesions at the primary due to limitations of evaluation. Maybe some L > R activity. Unrelated to the primary there is a deep breast uptake. Recommended mammogram. MRI next week for further evaluation of the orbit. Patient not interested in surgery. She has seen radiation oncology and discussed with them options. Discussed possible further pathologic assessment vs chemotherapy. Referral to medical oncology to discuss further treatment strategies.     Manas Suárez MD  Otolaryngology-Head & Neck Surgery    Documentation / Disclaimer Cancer Tumor Board Note  Cancer tumor board recommendations do not override what is determined to be reasonable care and treatment, which is dependent on the circumstances of a patient's case; the patient's medical, social, and personal concerns; and the clinical judgment of the oncologist [physician].

## 2022-04-08 NOTE — TUMOR CONFERENCE
Called and left message for patient to review PET scan results, tumor board discussion and recommendations. Left direct line and requested return call  To discuss.       Kadie Manuel, RN, BSN

## 2022-04-12 ENCOUNTER — ANCILLARY PROCEDURE (OUTPATIENT)
Dept: MRI IMAGING | Facility: CLINIC | Age: 72
End: 2022-04-12
Attending: OTOLARYNGOLOGY
Payer: COMMERCIAL

## 2022-04-12 DIAGNOSIS — C44.131 SEBACEOUS CELL CARCINOMA OF SKIN OF EYELID, INCLUDING CANTHUS: ICD-10-CM

## 2022-04-12 PROCEDURE — 70553 MRI BRAIN STEM W/O & W/DYE: CPT | Performed by: RADIOLOGY

## 2022-04-12 PROCEDURE — 70543 MRI ORBT/FAC/NCK W/O &W/DYE: CPT | Performed by: RADIOLOGY

## 2022-04-12 PROCEDURE — A9585 GADOBUTROL INJECTION: HCPCS | Performed by: RADIOLOGY

## 2022-04-12 RX ORDER — GADOBUTROL 604.72 MG/ML
7.5 INJECTION INTRAVENOUS ONCE
Status: COMPLETED | OUTPATIENT
Start: 2022-04-12 | End: 2022-04-12

## 2022-04-12 RX ADMIN — GADOBUTROL 6 ML: 604.72 INJECTION INTRAVENOUS at 16:59

## 2022-04-12 NOTE — DISCHARGE INSTRUCTIONS
MRI Contrast Discharge Instructions    The IV contrast you received today will pass out of your body in your  urine. This will happen in the next 24 hours. You will not feel this process.  Your urine will not change color.    Drink at least 4 extra glasses of water or juice today (unless your doctor  has restricted your fluids). This reduces the stress on your kidneys.  You may take your regular medicines.    If you are on dialysis: It is best to have dialysis today.    If you have a reaction: Most reactions happen right away. If you have  any new symptoms after leaving the hospital (such as hives or swelling),  call your hospital at the correct number below. Or call your family doctor.  If you have breathing distress or wheezing, call 911.    Special instructions: ***    I have read and understand the above information.    Signature:______________________________________ Date:___________    Staff:__________________________________________ Date:___________     Time:__________    [place] [facility] Departments:    ___[place]: [phone]  ___[facility]: [phone]  ___[place]: [phone] ___[facility]: [phone]  ___[facility]: [phone]  ___[facility]: [phone]  ___[phone]  ___[facility]: [phone]  ___[phone]

## 2022-04-12 NOTE — DISCHARGE INSTRUCTIONS
MRI Contrast Discharge Instructions    The IV contrast you received today will pass out of your body in your  urine. This will happen in the next 24 hours. You will not feel this process.  Your urine will not change color.    Drink at least 4 extra glasses of water or juice today (unless your doctor  has restricted your fluids). This reduces the stress on your kidneys.  You may take your regular medicines.    If you are on dialysis: It is best to have dialysis today.    If you have a reaction: Most reactions happen right away. If you have  any new symptoms after leaving the hospital (such as hives or swelling),  call your hospital at the correct number below. Or call your family doctor.  If you have breathing distress or wheezing, call 911.    Special instructions: ***    I have read and understand the above information.    Signature:______________________________________ Date:___________    Staff:__________________________________________ Date:___________     Time:__________    [place] [facility] Departments:    ___[facility]: [phone]  ___[facility]: [phone]  ___[place]: [phone] ___[facility]: [phone]  ___[facility]: [phone]  ___[facility]: [phone]  ___[phone]  ___[facility]: [phone]  ___[phone]

## 2022-04-19 ENCOUNTER — PATIENT OUTREACH (OUTPATIENT)
Dept: OTOLARYNGOLOGY | Facility: CLINIC | Age: 72
End: 2022-04-19
Payer: COMMERCIAL

## 2022-04-19 NOTE — TELEPHONE ENCOUNTER
Called and left multiple messages to review scan results and discuss tumor board recommendations with patient. Left direct line for return call.       Kadie Manuel, RN, BSN

## 2022-04-21 ENCOUNTER — PATIENT OUTREACH (OUTPATIENT)
Dept: OTOLARYNGOLOGY | Facility: CLINIC | Age: 72
End: 2022-04-21
Payer: COMMERCIAL

## 2022-04-21 NOTE — TELEPHONE ENCOUNTER
Called and left another message for patient to review MRI and PET scan results:    Impression:    1. Regarding the orbits and globes, there is no definite abnormal  contrast enhancement or mass. No evidence of perineural invasion.   2. Regarding the remainder of the brain, there is a 4.8 mm focus in  the right side of the pituitary gland. Imaging characteristics are  most suggestive of a pituitary microadenoma.  3. Mild chronic microvascular ischemic changes..     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 Left upper eyelid lesion demonstrates hypermetabolism, SUV max 7.33  compared to 4.94 of the contralateral avid.   2. Right medial canthus demonstrates elevated FDG uptake, SUV max  4.94. Visual inspection is recommended.   3. Elevated FDG uptake in the region of the anus, suggesting  hemorrhoids.   4. Right breast mass measuring 12 mm. Mammography should be obtained  for further evaluation.     Reviewed with patient that recommendation is for surgical treatment but patient very opposed to this and does not want any surgical treatment. Discussed that radiation is also likely not the best option given that it will likely leave her with non functional and painful eye. Patient in agreement. Discussed option to proceed with medical oncology consult but patient would like to further discuss this and think about this prior to proceeding. Advised that she does not have to proceed with the treatment, it would just be a consult to gain further information. Patient verbalized understanding of this.     Discussed with patient that the recommendation is also for her to get a mammogram to evaluate the right breast mass seen on PET scan. Patient verbalized understanding of this but wishes to proceed with follow-up with her PCP and will obtain orders for mammogram from PCP if she wishes to proceed.     Also reviewed with patient that she has a focus in her pituitary  gland. Offered referral to neurosurgery to further discuss this. Patient indicates she has no interest in proceeding with this referral.     She would like some time to think about her options and will update writer if she wishes to proceed with medical oncology referral. She has direct line for return call with further questions or concerns. Update sent to Dr. Eubanks.     Kadie Manuel, RN, BSN

## 2022-06-28 ENCOUNTER — PATIENT OUTREACH (OUTPATIENT)
Dept: OTOLARYNGOLOGY | Facility: CLINIC | Age: 72
End: 2022-06-28

## 2022-06-28 ENCOUNTER — PATIENT OUTREACH (OUTPATIENT)
Dept: ONCOLOGY | Facility: CLINIC | Age: 72
End: 2022-06-28

## 2022-06-28 DIAGNOSIS — C44.131 SEBACEOUS CELL CARCINOMA OF SKIN OF EYELID, INCLUDING CANTHUS: Primary | ICD-10-CM

## 2022-06-28 NOTE — PROGRESS NOTES
Received a call from patient indicating that she would like to proceed with recommendation to meet with medical oncologist to discuss possible treatment options. Returned call to patient and left voicemail indicating that medical oncology referral placed and she should expect call to arrange appointment. Left direct line and encouraged patient to return call if she has any additional questions or does not hear regarding scheduling.     Kadie Manuel, RN, BSN

## 2022-06-28 NOTE — PROGRESS NOTES
New Patient Oncology Nurse Navigator Note     Referring provider: Cha Lee     Referring Clinic/Organization: [facility]     Referred to (specialty): Medical Oncology    Requested provider (if applicable): Andrez Sebastian       Date Referral Received: 6/28/2022     Evaluation for : sebaceous cell carcinoma of the eyelid     Clinical History (per Nurse review of records provided):    **BOOK MARKED**   NOTES:    IMAGING:  *multple in [facility] system    PATHOLOGY:  3/22/2022  Final Diagnosis   A. Lateral lower eyelid conjunctiva, left, biopsy: Intraepithelial and invasive sebaceous carcinoma.      B. Nasal upper eyelid margin, left, biopsy: Diffuse intraepithelial involvement of sebaceous carcinoma with likely subepithelial invasion.      Clinical Assessment / Barriers to Care (Per Nurse): none noted       Records Location (Care Everywhere, Media, etc.): [facility]     Records Needed: none     Additional testing needed prior to consult: none

## 2022-06-29 NOTE — PROGRESS NOTES
I called Jessica as our scheduling department could not reach her.  She answered for me.  I asked and she is not able to meet with Dr. Maguire tomorrow and is too busy (company arriving momentarily) to discuss an appointment date--but she wants one as soon as she can.  Jessica is supposed to call me before the end of today.  If she is not able to, I will ask NPS (new patient scheduling) to reach out to her tomorrow as I am out of the office until 7/5.    Jessica has my direct contact information.

## 2022-07-11 NOTE — TELEPHONE ENCOUNTER
RECORDS STATUS - ALL OTHER DIAGNOSIS      RECORDS RECEIVED FROM: [facility], [place],    DATE RECEIVED:    NOTES STATUS DETAILS   OFFICE NOTE from referring provider Mesha Romano MD in [facility]: 22   OPERATIVE REPORT CE - [place] 21: Map Biopsy, excision upper eyelid tarsal conjunctiva with map biopsies bulbar conjunctiva, lower eyelid tarsal conjunctiva, nasal upper tarsal conjunctiva, plica, caruncle.   MEDICATION LIST [facility] 22   LABS     PATHOLOGY REPORTS [place] [employer]  [facility] received 7/15  [facility]  3/22/22: Surg Path    [place]  21: FR-    [facility]  3/1/22: [id number]   ANYTHING RELATED TO DIAGNOSIS Epic/[facility] 12/3/21   IMAGING (NEED IMAGES & REPORT)     MRI PACS 22: [facility]    21: [place]   PET PACS 3/31/22: [facility]

## 2022-07-12 ENCOUNTER — ONCOLOGY VISIT (OUTPATIENT)
Dept: ONCOLOGY | Facility: CLINIC | Age: 72
End: 2022-07-12
Attending: OTOLARYNGOLOGY
Payer: COMMERCIAL

## 2022-07-12 ENCOUNTER — PRE VISIT (OUTPATIENT)
Dept: ONCOLOGY | Facility: CLINIC | Age: 72
End: 2022-07-12

## 2022-07-12 DIAGNOSIS — C44.131 SEBACEOUS CELL CARCINOMA OF SKIN OF EYELID, INCLUDING CANTHUS: ICD-10-CM

## 2022-07-12 DIAGNOSIS — R92.8 ABNORMAL FINDING ON BREAST IMAGING: Primary | ICD-10-CM

## 2022-07-12 DIAGNOSIS — I10 UNCONTROLLED HYPERTENSION: ICD-10-CM

## 2022-07-12 DIAGNOSIS — C76.0 MALIGNANT NEOPLASM OF HEAD, FACE, AND NECK (H): ICD-10-CM

## 2022-07-12 LAB
ALBUMIN SERPL-MCNC: 4.2 G/DL (ref 3.4–5)
ALP SERPL-CCNC: 110 U/L (ref 40–150)
ALT SERPL W P-5'-P-CCNC: 19 U/L (ref 0–50)
ANION GAP SERPL CALCULATED.3IONS-SCNC: 11 MMOL/L (ref 3–14)
AST SERPL W P-5'-P-CCNC: 22 U/L (ref 0–45)
BASOPHILS # BLD AUTO: 0.1 10E3/UL (ref 0–0.2)
BASOPHILS NFR BLD AUTO: 1 %
BILIRUB SERPL-MCNC: 0.3 MG/DL (ref 0.2–1.3)
BUN SERPL-MCNC: 19 MG/DL (ref 7–30)
CALCIUM SERPL-MCNC: 9 MG/DL (ref 8.5–10.1)
CHLORIDE BLD-SCNC: 110 MMOL/L (ref 94–109)
CO2 SERPL-SCNC: 23 MMOL/L (ref 20–32)
CREAT SERPL-MCNC: 0.85 MG/DL (ref 0.52–1.04)
EOSINOPHIL # BLD AUTO: 0 10E3/UL (ref 0–0.7)
EOSINOPHIL NFR BLD AUTO: 0 %
ERYTHROCYTE [DISTWIDTH] IN BLOOD BY AUTOMATED COUNT: 12.3 % (ref 10–15)
GFR SERPL CREATININE-BSD FRML MDRD: 72 ML/MIN/1.73M2
GLUCOSE BLD-MCNC: 99 MG/DL (ref 70–99)
HCT VFR BLD AUTO: 45.7 % (ref 35–47)
HGB BLD-MCNC: 14.9 G/DL (ref 11.7–15.7)
IMM GRANULOCYTES # BLD: 0 10E3/UL
IMM GRANULOCYTES NFR BLD: 0 %
LYMPHOCYTES # BLD AUTO: 1.4 10E3/UL (ref 0.8–5.3)
LYMPHOCYTES NFR BLD AUTO: 20 %
MCH RBC QN AUTO: 30.5 PG (ref 26.5–33)
MCHC RBC AUTO-ENTMCNC: 32.6 G/DL (ref 31.5–36.5)
MCV RBC AUTO: 94 FL (ref 78–100)
MONOCYTES # BLD AUTO: 0.4 10E3/UL (ref 0–1.3)
MONOCYTES NFR BLD AUTO: 6 %
NEUTROPHILS # BLD AUTO: 5.3 10E3/UL (ref 1.6–8.3)
NEUTROPHILS NFR BLD AUTO: 73 %
NRBC # BLD AUTO: 0 10E3/UL
NRBC BLD AUTO-RTO: 0 /100
PLATELET # BLD AUTO: 221 10E3/UL (ref 150–450)
POTASSIUM BLD-SCNC: 3.8 MMOL/L (ref 3.4–5.3)
PROT SERPL-MCNC: 7.7 G/DL (ref 6.8–8.8)
RBC # BLD AUTO: 4.89 10E6/UL (ref 3.8–5.2)
SODIUM SERPL-SCNC: 144 MMOL/L (ref 133–144)
WBC # BLD AUTO: 7.3 10E3/UL (ref 4–11)

## 2022-07-12 PROCEDURE — 99205 OFFICE O/P NEW HI 60 MIN: CPT | Performed by: INTERNAL MEDICINE

## 2022-07-12 PROCEDURE — 80053 COMPREHEN METABOLIC PANEL: CPT | Performed by: INTERNAL MEDICINE

## 2022-07-12 PROCEDURE — G0463 HOSPITAL OUTPT CLINIC VISIT: HCPCS

## 2022-07-12 PROCEDURE — 85025 COMPLETE CBC W/AUTO DIFF WBC: CPT | Performed by: INTERNAL MEDICINE

## 2022-07-12 PROCEDURE — 36415 COLL VENOUS BLD VENIPUNCTURE: CPT | Performed by: INTERNAL MEDICINE

## 2022-07-12 RX ORDER — LORAZEPAM 0.5 MG/1
.25-.5 TABLET ORAL EVERY 8 HOURS PRN
Qty: 45 TABLET | Refills: 1 | Status: SHIPPED | OUTPATIENT
Start: 2022-07-12 | End: 2022-07-13

## 2022-07-12 RX ORDER — AMLODIPINE BESYLATE 5 MG/1
5 TABLET ORAL DAILY
Qty: 30 TABLET | Refills: 0 | Status: SHIPPED | OUTPATIENT
Start: 2022-07-12 | End: 2022-07-13

## 2022-07-12 ASSESSMENT — PAIN SCALES - GENERAL: PAINLEVEL: NO PAIN (0)

## 2022-07-12 NOTE — LETTER
7/12/2022         RE: Jessica Zamorano  [address] [place] [address]  [address]  [facility] [address]        Dear Colleague,    Thank you for referring your patient, Jessica Zamorano, to the [facility]. Please see a copy of my visit note below.    [facility] Hematology and Oncology Outpatient Consult Note    Patient: Jessica Zamorano  MRN: [id number]        Reason for Visit    I was consulted by  regarding   1. 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    We discussed the importance of surgery as a component of any curative treatment strategy for sebaceous cell carcinoma of skin of eyelid.    You voiced understanding that by declining surgery all treatment that can be offered to you would be palliative in nature - meaning we would not be able to cure your of your cancer.    You voiced interest in palliative treatment with chemotherapy.    I recommended port placement for chemotherapy followed by induction chemotherapy with TPF, dose-modified due to age > 70 followed by repeat PET. After going over potential toxicities of this regimen you voiced your concern regarding hair loss and told me that you would rather pursue a chemo regimen that does not cause complete hair loss even if it may lead to an inferior response against cancer.    I then recommended a trial of palliative chemotherapy with carboplatin/gemcitabine. You were given detailed information material about this regimen today.    I refilled your Rx for amlodipine today. Please f/u with your primary MD this week for followup regarding your elevated blood pressure.    I ordered bilateral diagnostic mammograms for you today for followup on the abnormal breast finding on  "your recent PET scan.    I spent a total of 71 minutes on the care of this patient on the day of service including face to face time and the remainder in chart review, care coordination, and documentation on the day of service.      History    Ms. Jessica Zamorano is a 71 year old woman who has been referred by Dr. Eubanks. Her overriding symptoms at this time are irritation and bumpiness of both her upper and lower left eyelids. Her left eye is becoming more and more shut.    Dr. Eubanks's note from 4/1/22 reviewed:    \"Jessica Zamorano is a 71 year old woman who is referred for evaluation of a sebaceous cell carcinoma of the eyelid.      She was seen initially in the [facility] system back in 2021. She had a biopsy performed which was consistent with sebaceous cell carcinoma.     She previously underwent evaluation at the [facility] in 2021.She underwent mapping biopsies of the tumor with plans for surgery.  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 She was recommended for PET scan and orbital exenteration. She stated that she was not willing to have the imaging done or proceed with surgery or any further appointments. She was lost to follow-up after the biopsy, last seen in April 2021.     She saw Dr Orozco on 3/22/2022 for evaluation. At that time there was concern for a lesion involving the lid, conjunctiva, and cornea. A biopsy was performed which was consistent with sebaceous cell carcinoma.  It was determined that given the extent of disease orbit sparing would not be possible.      She had a PET scan performed to evaluate for metastatic disease which showed only the local disease.     She feels like her eye is largely the same.  She feels like it fluctuates as far as dryness, a bit more dry today.  She says that she is able to see out of the eye but it is not particularly good.  She feels like she would probably be limited with reading on that side.  She does note that the vision " "is blurry.  She does not feel like her vision is changed over the last 2 years.  She has no pain associated with the eye.  She has some epiphora.  She denies any facial numbness.  She has no other masses in her neck or parotid.\"    PET/CT impression from 3/31/22 reviewed:  Left upper eyelid lesion demonstrates hypermetabolism, SUV max 7.33.   Right medial canthus demonstrates elevated FDG uptake, SUV max 4.94.   Elevated FDG uptake in the region of the anus, suggesting hemorrhoids.   Right breast mass measuring 12 mm. Mammography should be obtained for further evaluation.    Tumor conference note 4/8/22: \"Patient not interested in surgery. She has seen radiation oncology and discussed with them options. Discussed possible further pathologic assessment vs chemotherapy. Referral to medical oncology to discuss further treatment strategies.\"     MR orbit/neck 4/12 reported:  Regarding the orbits and globes, there is no definite abnormal contrast enhancement or mass. Known sebaceous cell carcinoma in the medial canthus area on the left is not seen on the MRI images possibly related to superficial location or small size.   There is a 4.8 mm focus in the right side of the pituitary gland. Imaging characteristics are most suggestive of a pituitary microadenoma.      Current Outpatient Medications   Medication     acetaminophen (TYLENOL) 325 MG tablet     amLODIPine (NORVASC) 5 MG tablet     melatonin 1 MG TABS tablet     multivitamin w/minerals (MULTIVITAMINS W/MINERALS) tablet     No current facility-administered medications for this visit.       Past History  Past Medical History:   Diagnosis Date     Hypertension      No past surgical history on file.  Family History   Problem Relation Age of Onset     Glaucoma No family hx of      Macular Degeneration No family hx of      Social History     Socioeconomic History     Marital status: Single   Tobacco Use     Smoking status: Never Smoker     Smokeless tobacco: Never Used "       Allergies    Allergies   Allergen Reactions     Hyaluronan Other (See Comments)     Stinging in eye      Prednisolone Other (See Comments)     Extreme dryness in eye  Extreme dryness in eye  Extreme dryness in eye  Extreme dryness in eye       Sodium Hyaluronate Other (See Comments)     Stinging in eye      Dexamethasone Rash     Metronidazole Rash     Povidone Iodine Rash     Tobramycin Rash       Current Outpatient Medications   Medication     acetaminophen (TYLENOL) 325 MG tablet     amLODIPine (NORVASC) 5 MG tablet     melatonin 1 MG TABS tablet     multivitamin w/minerals (MULTIVITAMINS W/MINERALS) tablet     No current facility-administered medications for this visit.       Physical Exam  There were no vitals taken for this visit.  ECOG Performance Status: 1    GENERAL: Alert and oriented to time place and person. In no distress.    EYES: Left upper and lower eyelid irritation/bumpiness. She has diffculty with keeping her left upper and lower eyelids open.    LYMPH NODES: No palpable infra/supraclavicular, cervical, or axillary lymphadenopathy.    CHEST: Lungs clear to auscultation bilaterally.    CVS: Regular rate and rhythm.    ABDOMEN: Soft. Not tender. Not distended. No palpable hepatomegaly or splenomegaly.    EXTREMITIES: Warm.    SKIN: No petechiae.    Signed by: Mk Dobson MD        Again, thank you for allowing me to participate in the care of your patient.      Sincerely,    Mk Dobson MD

## 2022-07-12 NOTE — NURSING NOTE
"Oncology Rooming Note    July 12, 2022 1:48 PM   Jessica Zamorano is a 71 year old female who presents for:    Chief Complaint   Patient presents with     Oncology Clinic Visit     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     Initial Vitals: BP (!) 191/83   Pulse 102   Temp 98.4  F (36.9  C) (Oral)   Wt 63.5 kg (140 lb)   SpO2 97%   BMI 26.45 kg/m   Estimated body mass index is 26.45 kg/m  as calculated from the following:    Height as of 4/1/22: 1.549 m (5' 1\").    Weight as of this encounter: 63.5 kg (140 lb). Body surface area is 1.65 meters squared.  No Pain (0) Comment: Data Unavailable   No LMP recorded. Patient is postmenopausal.  Allergies reviewed: Yes  Medications reviewed: Yes    Medications: Medication refills not needed today.  Pharmacy name entered into [facility]: [facility] [address] [place] [address].    Clinical concerns: wondering if we can refill her amlodipine        Devi Goldstein CMA            "

## 2022-07-12 NOTE — PROGRESS NOTES
[facility] Hematology and Oncology Outpatient Consult Note    Patient: Jessica Zamorano  MRN: [id number]        Reason for Visit    I was consulted by  regarding   1. 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    We discussed the importance of surgery as a component of any curative treatment strategy for sebaceous cell carcinoma of skin of eyelid.    You voiced understanding that by declining surgery all treatment that can be offered to you would be palliative in nature - meaning we would not be able to cure your of your cancer.    You voiced interest in palliative treatment with chemotherapy.    I recommended port placement for chemotherapy followed by induction chemotherapy with TPF, dose-modified due to age > 70 followed by repeat PET. After going over potential toxicities of this regimen you voiced your concern regarding hair loss and told me that you would rather pursue a chemo regimen that does not cause complete hair loss even if it may lead to an inferior response against cancer.    I then recommended a trial of palliative chemotherapy with carboplatin/gemcitabine. You were given detailed information material about this regimen today.    I refilled your Rx for amlodipine today. Please f/u with your primary MD this week for followup regarding your elevated blood pressure.    I ordered bilateral diagnostic mammograms for you today for followup on the abnormal breast finding on your recent PET scan.    I spent a total of 71 minutes on the care of this patient on the day of service including face to face time and the remainder in chart review, care coordination, and documentation on the day of service.      History    Ms. Jessica Zamorano is  "a 71 year old woman who has been referred by Dr. Eubanks. Her overriding symptoms at this time are irritation and bumpiness of both her upper and lower left eyelids. Her left eye is becoming more and more shut.    Dr. Eubanks's note from 4/1/22 reviewed:    \"Jessica Zamorano is a 71 year old woman who is referred for evaluation of a sebaceous cell carcinoma of the eyelid.      She was seen initially in the [facility] system back in 2021. She had a biopsy performed which was consistent with sebaceous cell carcinoma.     She previously underwent evaluation at the [facility] in 2021.She underwent mapping biopsies of the tumor with plans for surgery.  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 She was recommended for PET scan and orbital exenteration. She stated that she was not willing to have the imaging done or proceed with surgery or any further appointments. She was lost to follow-up after the biopsy, last seen in April 2021.     She saw Dr Orozco on 3/22/2022 for evaluation. At that time there was concern for a lesion involving the lid, conjunctiva, and cornea. A biopsy was performed which was consistent with sebaceous cell carcinoma.  It was determined that given the extent of disease orbit sparing would not be possible.      She had a PET scan performed to evaluate for metastatic disease which showed only the local disease.     She feels like her eye is largely the same.  She feels like it fluctuates as far as dryness, a bit more dry today.  She says that she is able to see out of the eye but it is not particularly good.  She feels like she would probably be limited with reading on that side.  She does note that the vision is blurry.  She does not feel like her vision is changed over the last 2 years.  She has no pain associated with the eye.  She has some epiphora.  She denies any facial numbness.  She has no other masses in her neck or parotid.\"    PET/CT impression from 3/31/22 " "reviewed:  Left upper eyelid lesion demonstrates hypermetabolism, SUV max 7.33.   Right medial canthus demonstrates elevated FDG uptake, SUV max 4.94.   Elevated FDG uptake in the region of the anus, suggesting hemorrhoids.   Right breast mass measuring 12 mm. Mammography should be obtained for further evaluation.    Tumor conference note 4/8/22: \"Patient not interested in surgery. She has seen radiation oncology and discussed with them options. Discussed possible further pathologic assessment vs chemotherapy. Referral to medical oncology to discuss further treatment strategies.\"     MR orbit/neck 4/12 reported:  Regarding the orbits and globes, there is no definite abnormal contrast enhancement or mass. Known sebaceous cell carcinoma in the medial canthus area on the left is not seen on the MRI images possibly related to superficial location or small size.   There is a 4.8 mm focus in the right side of the pituitary gland. Imaging characteristics are most suggestive of a pituitary microadenoma.      Current Outpatient Medications   Medication     acetaminophen (TYLENOL) 325 MG tablet     amLODIPine (NORVASC) 5 MG tablet     melatonin 1 MG TABS tablet     multivitamin w/minerals (MULTIVITAMINS W/MINERALS) tablet     No current facility-administered medications for this visit.       Past History  Past Medical History:   Diagnosis Date     Hypertension      No past surgical history on file.  Family History   Problem Relation Age of Onset     Glaucoma No family hx of      Macular Degeneration No family hx of      Social History     Socioeconomic History     Marital status: Single   Tobacco Use     Smoking status: Never Smoker     Smokeless tobacco: Never Used       Allergies    Allergies   Allergen Reactions     Hyaluronan Other (See Comments)     Stinging in eye      Prednisolone Other (See Comments)     Extreme dryness in eye  Extreme dryness in eye  Extreme dryness in eye  Extreme dryness in eye       Sodium " Hyaluronate Other (See Comments)     Stinging in eye      Dexamethasone Rash     Metronidazole Rash     Povidone Iodine Rash     Tobramycin Rash       Current Outpatient Medications   Medication     acetaminophen (TYLENOL) 325 MG tablet     amLODIPine (NORVASC) 5 MG tablet     melatonin 1 MG TABS tablet     multivitamin w/minerals (MULTIVITAMINS W/MINERALS) tablet     No current facility-administered medications for this visit.       Physical Exam  There were no vitals taken for this visit.  ECOG Performance Status: 1    GENERAL: Alert and oriented to time place and person. In no distress.    EYES: Left upper and lower eyelid irritation/bumpiness. She has diffculty with keeping her left upper and lower eyelids open.    LYMPH NODES: No palpable infra/supraclavicular, cervical, or axillary lymphadenopathy.    CHEST: Lungs clear to auscultation bilaterally.    CVS: Regular rate and rhythm.    ABDOMEN: Soft. Not tender. Not distended. No palpable hepatomegaly or splenomegaly.    EXTREMITIES: Warm.    SKIN: No petechiae.      Signed by: Mk Dobson MD

## 2022-07-12 NOTE — PATIENT INSTRUCTIONS
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    We discussed the importance of surgery as a component of any curative treatment strategy for sebaceous cell carcinoma of skin of eyelid.    You voiced understanding that by declining surgery all treatment that can be offered to you would be palliative in nature - meaning we would not be able to cure your of your cancer.    You voiced interest in palliative treatment with chemotherapy.    I recommended port placement for chemotherapy followed by induction chemotherapy with TPF, dose-modified due to age > 70 followed by repeat PET. After going over potential toxicities of this regimen you voiced your concern regarding hair loss and told me that you would rather pursue a chemo regimen that does not cause complete hair loss even if it may lead to an inferior response against cancer.    I then recommended a trial of palliative chemotherapy with carboplatin/gemcitabine. You were given detailed information material about this regimen today.    I refilled your Rx for amlodipine today. Please f/u with your primary MD this week for followup regarding your elevated blood pressure.    I ordered bilateral diagnostic mammograms for you today for followup on the abnormal breast finding on your recent PET scan.

## 2022-07-13 ENCOUNTER — TELEPHONE (OUTPATIENT)
Dept: ONCOLOGY | Facility: CLINIC | Age: 72
End: 2022-07-13

## 2022-07-13 RX ORDER — AMLODIPINE BESYLATE 5 MG/1
5 TABLET ORAL DAILY
Qty: 30 TABLET | Refills: 0 | Status: SHIPPED | OUTPATIENT
Start: 2022-07-13 | End: 2022-08-09

## 2022-07-13 NOTE — TELEPHONE ENCOUNTER
Central Prior Authorization Team   Phone: [phone]      EPA Denied: waiting on denial letter

## 2022-07-14 VITALS
OXYGEN SATURATION: 97 % | HEIGHT: 61 IN | DIASTOLIC BLOOD PRESSURE: 83 MMHG | HEART RATE: 102 BPM | SYSTOLIC BLOOD PRESSURE: 191 MMHG | WEIGHT: 140 LBS | BODY MASS INDEX: 26.43 KG/M2 | TEMPERATURE: 98.4 F

## 2022-07-14 RX ORDER — MEPERIDINE HYDROCHLORIDE 25 MG/ML
25 INJECTION INTRAMUSCULAR; INTRAVENOUS; SUBCUTANEOUS EVERY 30 MIN PRN
Status: CANCELLED | OUTPATIENT
Start: 2022-08-26

## 2022-07-14 RX ORDER — HEPARIN SODIUM (PORCINE) LOCK FLUSH IV SOLN 100 UNIT/ML 100 UNIT/ML
5 SOLUTION INTRAVENOUS
Status: CANCELLED | OUTPATIENT
Start: 2022-08-19

## 2022-07-14 RX ORDER — ALBUTEROL SULFATE 0.83 MG/ML
2.5 SOLUTION RESPIRATORY (INHALATION)
Status: CANCELLED | OUTPATIENT
Start: 2022-08-26

## 2022-07-14 RX ORDER — METHYLPREDNISOLONE SODIUM SUCCINATE 125 MG/2ML
125 INJECTION, POWDER, LYOPHILIZED, FOR SOLUTION INTRAMUSCULAR; INTRAVENOUS
Status: CANCELLED
Start: 2022-08-19

## 2022-07-14 RX ORDER — EPINEPHRINE 1 MG/ML
0.3 INJECTION, SOLUTION INTRAMUSCULAR; SUBCUTANEOUS EVERY 5 MIN PRN
Status: CANCELLED | OUTPATIENT
Start: 2022-08-19

## 2022-07-14 RX ORDER — EPINEPHRINE 1 MG/ML
0.3 INJECTION, SOLUTION INTRAMUSCULAR; SUBCUTANEOUS EVERY 5 MIN PRN
Status: CANCELLED | OUTPATIENT
Start: 2022-08-26

## 2022-07-14 RX ORDER — ALBUTEROL SULFATE 0.83 MG/ML
2.5 SOLUTION RESPIRATORY (INHALATION)
Status: CANCELLED | OUTPATIENT
Start: 2022-08-19

## 2022-07-14 RX ORDER — MEPERIDINE HYDROCHLORIDE 25 MG/ML
25 INJECTION INTRAMUSCULAR; INTRAVENOUS; SUBCUTANEOUS EVERY 30 MIN PRN
Status: CANCELLED | OUTPATIENT
Start: 2022-08-19

## 2022-07-14 RX ORDER — HEPARIN SODIUM,PORCINE 10 UNIT/ML
5 VIAL (ML) INTRAVENOUS
Status: CANCELLED | OUTPATIENT
Start: 2022-08-26

## 2022-07-14 RX ORDER — ALBUTEROL SULFATE 90 UG/1
1-2 AEROSOL, METERED RESPIRATORY (INHALATION)
Status: CANCELLED
Start: 2022-08-26

## 2022-07-14 RX ORDER — ALBUTEROL SULFATE 90 UG/1
1-2 AEROSOL, METERED RESPIRATORY (INHALATION)
Status: CANCELLED
Start: 2022-08-19

## 2022-07-14 RX ORDER — LORAZEPAM 2 MG/ML
0.5 INJECTION INTRAMUSCULAR EVERY 4 HOURS PRN
Status: CANCELLED | OUTPATIENT
Start: 2022-08-26

## 2022-07-14 RX ORDER — LORAZEPAM 0.5 MG/1
.25-.5 TABLET ORAL EVERY 8 HOURS PRN
Qty: 20 TABLET | Refills: 1 | Status: SHIPPED | OUTPATIENT
Start: 2022-07-14 | End: 2022-09-30

## 2022-07-14 RX ORDER — HEPARIN SODIUM (PORCINE) LOCK FLUSH IV SOLN 100 UNIT/ML 100 UNIT/ML
5 SOLUTION INTRAVENOUS
Status: CANCELLED | OUTPATIENT
Start: 2022-08-26

## 2022-07-14 RX ORDER — METHYLPREDNISOLONE SODIUM SUCCINATE 125 MG/2ML
125 INJECTION, POWDER, LYOPHILIZED, FOR SOLUTION INTRAMUSCULAR; INTRAVENOUS
Status: CANCELLED
Start: 2022-08-26

## 2022-07-14 RX ORDER — DIPHENHYDRAMINE HYDROCHLORIDE 50 MG/ML
50 INJECTION INTRAMUSCULAR; INTRAVENOUS
Status: CANCELLED
Start: 2022-08-26

## 2022-07-14 RX ORDER — DIPHENHYDRAMINE HYDROCHLORIDE 50 MG/ML
50 INJECTION INTRAMUSCULAR; INTRAVENOUS
Status: CANCELLED
Start: 2022-08-19

## 2022-07-14 RX ORDER — LORAZEPAM 2 MG/ML
0.5 INJECTION INTRAMUSCULAR EVERY 4 HOURS PRN
Status: CANCELLED | OUTPATIENT
Start: 2022-08-19

## 2022-07-14 RX ORDER — HEPARIN SODIUM,PORCINE 10 UNIT/ML
5 VIAL (ML) INTRAVENOUS
Status: CANCELLED | OUTPATIENT
Start: 2022-08-19

## 2022-07-15 NOTE — TELEPHONE ENCOUNTER
I called to have the denial letter re-faxed.  It was not received to the Central PA team department.

## 2022-07-15 NOTE — TELEPHONE ENCOUNTER
Central Prior Authorization Team   Phone: [phone]      PRIOR AUTHORIZATION DENIED    Medication: LORazepam (ATIVAN) 0.5 MG tablet - EPA Denied     Denial Date: 7/13/2022    Denial Rational: The patient needs to have tried/failed one of these: Citalopram, Escitalopram, Fluoxetine, Sertraline, Duloxetine, or Venlafaxine IR/ER        Appeal Information: If the Provider/Patient would like to appeal this denial, please provide a letter of medical necessity explaining why Preferred Formulary medications are not appropriate in the treatment of the patient's condition; along with any previous therapies tried/failed.    Once completed, please route back to me directly: Olivia Whalen

## 2022-07-18 ENCOUNTER — LAB REQUISITION (OUTPATIENT)
Dept: LAB | Facility: CLINIC | Age: 72
End: 2022-07-18
Payer: COMMERCIAL

## 2022-07-18 PROCEDURE — 88342 IMHCHEM/IMCYTCHM 1ST ANTB: CPT | Mod: TC | Performed by: INTERNAL MEDICINE

## 2022-07-19 ENCOUNTER — ANCILLARY PROCEDURE (OUTPATIENT)
Dept: MAMMOGRAPHY | Facility: CLINIC | Age: 72
End: 2022-07-19
Attending: INTERNAL MEDICINE
Payer: COMMERCIAL

## 2022-07-19 DIAGNOSIS — R92.8 ABNORMAL FINDING ON BREAST IMAGING: ICD-10-CM

## 2022-07-19 PROCEDURE — 88305 TISSUE EXAM BY PATHOLOGIST: CPT | Mod: 26 | Performed by: PATHOLOGY

## 2022-07-19 PROCEDURE — 88305 TISSUE EXAM BY PATHOLOGIST: CPT | Mod: TC | Performed by: INTERNAL MEDICINE

## 2022-07-19 PROCEDURE — 77066 DX MAMMO INCL CAD BI: CPT | Performed by: STUDENT IN AN ORGANIZED HEALTH CARE EDUCATION/TRAINING PROGRAM

## 2022-07-19 PROCEDURE — G0279 TOMOSYNTHESIS, MAMMO: HCPCS | Performed by: STUDENT IN AN ORGANIZED HEALTH CARE EDUCATION/TRAINING PROGRAM

## 2022-07-20 ENCOUNTER — LAB REQUISITION (OUTPATIENT)
Dept: LAB | Facility: CLINIC | Age: 72
End: 2022-07-20
Payer: COMMERCIAL

## 2022-07-21 ENCOUNTER — TELEPHONE (OUTPATIENT)
Dept: MAMMOGRAPHY | Facility: CLINIC | Age: 72
End: 2022-07-21

## 2022-07-21 NOTE — TELEPHONE ENCOUNTER
Spoke to Jessica about the benign finding of a fibroadenoma.  We discussed the Radiologist's recommendation of continuing on with her yearly screening mammograms.  Also discussed that her provider is having another Pathologist review the results and that the provider will reach out and let her know if there are changes in the recommendations.  Jessica verbalized understanding and all questions and concerns were answered at this time.

## 2022-07-21 NOTE — TELEPHONE ENCOUNTER
Left a message for Jessica regarding availability of her breast biopsy results.  Awaiting return phone call.  Call back number left was [phone].

## 2022-07-22 LAB
PATH REPORT.COMMENTS IMP SPEC: ABNORMAL
PATH REPORT.COMMENTS IMP SPEC: ABNORMAL
PATH REPORT.COMMENTS IMP SPEC: YES
PATH REPORT.FINAL DX SPEC: ABNORMAL
PATH REPORT.GROSS SPEC: ABNORMAL
PATH REPORT.MICROSCOPIC SPEC OTHER STN: ABNORMAL
PATH REPORT.RELEVANT HX SPEC: ABNORMAL
PATH REPORT.RELEVANT HX SPEC: ABNORMAL
PATH REPORT.SITE OF ORIGIN SPEC: ABNORMAL

## 2022-07-22 PROCEDURE — 88321 CONSLTJ&REPRT SLD PREP ELSWR: CPT | Performed by: OPHTHALMOLOGY

## 2022-07-25 NOTE — RESULT ENCOUNTER NOTE
Please let her know her breast imaging report is indicating now a need for biopsy. Check with her to see if it's been done or needs to be scheduled.

## 2022-08-01 DIAGNOSIS — C44.131 SEBACEOUS CELL CARCINOMA OF SKIN OF EYELID, INCLUDING CANTHUS: Primary | ICD-10-CM

## 2022-08-03 PROCEDURE — 88342 IMHCHEM/IMCYTCHM 1ST ANTB: CPT | Mod: XS | Performed by: OPHTHALMOLOGY

## 2022-08-03 PROCEDURE — 88321 CONSLTJ&REPRT SLD PREP ELSWR: CPT | Performed by: OPHTHALMOLOGY

## 2022-08-04 ENCOUNTER — PATIENT OUTREACH (OUTPATIENT)
Dept: ONCOLOGY | Facility: CLINIC | Age: 72
End: 2022-08-04

## 2022-08-04 DIAGNOSIS — D24.1 FIBROADENOMA OF BREAST, RIGHT: Primary | ICD-10-CM

## 2022-08-04 NOTE — PROGRESS NOTES
New Patient Oncology Nurse Navigator Note     Referring provider: Dr. Mk Dobson    Referring Clinic/Organization:  [facility]    Referred to (specialty:) Breast surgery     Date Referral Received: August 4, 2022     Evaluation for:  Benign breast condition     Clinical History (per Nurse review of records provided):      Patient is currently in Dr. Dobson's care for treatment of sebaceous cell carcinoma of skin of eyelid, including canthus. A right breast mass measuring 12 mm was noted on 3/31/22 PET scan.      Bilateral breast diagnostic mammograms were performed on 7/19/22 and views demonstrate isodense oval mass 8:00 position mid depth right breast that correlates with PET CT finding described on 3/31/2022.  No suspicious left breast finding. Targeted right breast ultrasound by radiologist and technologist demonstrates an oval hypoechoic mass 8:00 position 2 cm from nipple, 1.2 x 1.4 x 0.6 cm, that correlates with mammogram. No suspicious right axillary lymph nodes.    7/19/22 -   A. RIGHT BREAST, 8:00 POSITION, 3 CM FROM NIPPLE, BIOPSY:  -Fibroadenoma, hyalinized intracanalicular pattern.  -Negative for atypia or malignancy.     Records Location: See Bookmarked material     Records Needed: Breast imaging for past 5 years     Writer received referral, reviewed for appropriate plan, and sent to New Patient Scheduling for completion.

## 2022-08-08 ENCOUNTER — PATIENT OUTREACH (OUTPATIENT)
Dept: ONCOLOGY | Facility: CLINIC | Age: 72
End: 2022-08-08

## 2022-08-08 NOTE — PROGRESS NOTES
"[facility]: Cancer Care                                                                                        Met with Jessica to discuss chemotherapy and resources available at the [facility]. Provided patient with \"My Cancer Guidebook\", and [facility] printouts on Carboplatin and Gemcitabine.     Reviewed administration, side effects (including myelosuppression, nausea/vomiting, diarrhea/constipation, hair loss, mouth sores) and ongoing symptom management by APPs in clinic.     Provided phone numbers for triage and after hours care. Highlighted steps to expect when getting chemotherapy (check in, labs, pre- meds, infusion), Discussed that chemo treatment may be delayed a day or two due to blood counts, infusion schedule or patient's need to modify. Included a one page resource of symptoms and when to contact the Cancer Clinic with questions or concerns.      Answered all Jessica's questions to her stated satisfaction.  Encouraged Jessica to call with future questions and concerns.                                                                     Bridgette Brian, RNCC  "

## 2022-08-09 ENCOUNTER — PATIENT OUTREACH (OUTPATIENT)
Dept: ONCOLOGY | Facility: CLINIC | Age: 72
End: 2022-08-09

## 2022-08-09 DIAGNOSIS — I10 UNCONTROLLED HYPERTENSION: ICD-10-CM

## 2022-08-09 RX ORDER — AMLODIPINE BESYLATE 5 MG/1
5 TABLET ORAL DAILY
Qty: 30 TABLET | Refills: 0 | Status: SHIPPED | OUTPATIENT
Start: 2022-08-09 | End: 2022-09-08

## 2022-08-09 NOTE — PROGRESS NOTES
Revised [employer] mobility paperwork sent to them.      A copy was also sent to Jessica's home address.      Informed Jessica this was done.     She did decline to see a breast surgeon at this time for her Fibroadenoma.     Bridgette Brian RN

## 2022-08-12 ENCOUNTER — ANCILLARY PROCEDURE (OUTPATIENT)
Dept: RADIOLOGY | Facility: AMBULATORY SURGERY CENTER | Age: 72
End: 2022-08-12
Attending: INTERNAL MEDICINE
Payer: COMMERCIAL

## 2022-08-12 ENCOUNTER — HOSPITAL ENCOUNTER (OUTPATIENT)
Facility: AMBULATORY SURGERY CENTER | Age: 72
Discharge: HOME | End: 2022-08-12
Attending: RADIOLOGY | Admitting: RADIOLOGY
Payer: COMMERCIAL

## 2022-08-12 VITALS
WEIGHT: 140 LBS | HEART RATE: 73 BPM | RESPIRATION RATE: 16 BRPM | OXYGEN SATURATION: 97 % | DIASTOLIC BLOOD PRESSURE: 69 MMHG | SYSTOLIC BLOOD PRESSURE: 128 MMHG | BODY MASS INDEX: 27.48 KG/M2 | TEMPERATURE: 98 F | HEIGHT: 60 IN

## 2022-08-12 PROCEDURE — 36561 INSERT TUNNELED CV CATH: CPT

## 2022-08-12 PROCEDURE — 77001 FLUOROGUIDE FOR VEIN DEVICE: CPT | Mod: 26 | Performed by: RADIOLOGY

## 2022-08-12 PROCEDURE — 76937 US GUIDE VASCULAR ACCESS: CPT | Mod: 26 | Performed by: RADIOLOGY

## 2022-08-12 PROCEDURE — 36561 INSERT TUNNELED CV CATH: CPT | Performed by: RADIOLOGY

## 2022-08-12 DEVICE — CATH PORT POWERPORT CLEARVUE SLIM 6FR 5616000
Type: IMPLANTABLE DEVICE | Site: CHEST | Status: NON-FUNCTIONAL
Removed: 2022-10-17

## 2022-08-12 RX ORDER — BUPIVACAINE HYDROCHLORIDE 2.5 MG/ML
INJECTION, SOLUTION EPIDURAL; INFILTRATION; INTRACAUDAL PRN
Status: DISCONTINUED | OUTPATIENT
Start: 2022-08-12 | End: 2022-08-12 | Stop reason: HOSPADM

## 2022-08-12 RX ORDER — HEPARIN SODIUM,PORCINE 10 UNIT/ML
5-10 VIAL (ML) INTRAVENOUS
Status: CANCELLED | OUTPATIENT
Start: 2022-08-12

## 2022-08-12 RX ORDER — LIDOCAINE HYDROCHLORIDE 10 MG/ML
INJECTION, SOLUTION EPIDURAL; INFILTRATION; INTRACAUDAL; PERINEURAL PRN
Status: DISCONTINUED | OUTPATIENT
Start: 2022-08-12 | End: 2022-08-12 | Stop reason: HOSPADM

## 2022-08-12 RX ORDER — CEFAZOLIN SODIUM 2 G/50ML
2 SOLUTION INTRAVENOUS
Status: DISCONTINUED | OUTPATIENT
Start: 2022-08-12 | End: 2022-08-13 | Stop reason: HOSPADM

## 2022-08-12 RX ORDER — LIDOCAINE 40 MG/G
CREAM TOPICAL
Status: DISCONTINUED | OUTPATIENT
Start: 2022-08-12 | End: 2022-08-13 | Stop reason: HOSPADM

## 2022-08-12 RX ORDER — HEPARIN SODIUM,PORCINE 10 UNIT/ML
5-10 VIAL (ML) INTRAVENOUS EVERY 24 HOURS
Status: CANCELLED | OUTPATIENT
Start: 2022-08-12

## 2022-08-12 RX ORDER — SODIUM CHLORIDE 9 MG/ML
INJECTION, SOLUTION INTRAVENOUS CONTINUOUS
Status: DISCONTINUED | OUTPATIENT
Start: 2022-08-12 | End: 2022-08-13 | Stop reason: HOSPADM

## 2022-08-12 RX ORDER — HEPARIN SODIUM (PORCINE) LOCK FLUSH IV SOLN 100 UNIT/ML 100 UNIT/ML
5-10 SOLUTION INTRAVENOUS
Status: CANCELLED | OUTPATIENT
Start: 2022-08-12

## 2022-08-12 NOTE — BRIEF OP NOTE
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  Complications: None.  Implants:   Implant Name Type Inv. Item Serial No.  Lot No. LRB No. Used Action   CATH PORT POWERPORT CLEARVUE SLIM [id number] [id number] - [id number] Port CATH PORT POWERPORT CLEARVUE SLIM 6FR [id number]  CR [employer] [id number] N/A 1 Implanted       6 Romanian single lumen 20.5 cm BD Bard PowerPort ClearVUE Slim placed via right internal jugular vein. Tip in the high right atrium. Heparin locked and ready for immediate use.

## 2022-08-12 NOTE — DISCHARGE INSTRUCTIONS
A collaboration between [facility] and [facility]  Experts in minimally invasive, targeted treatments performed using imaging guidance    Venous Access Device,  Port Catheter or Tunneled or Non-Tunneled Central Line Placement    Today you had a procedure today to install a venous access device; either a tunneled central vein catheter or a subcutaneous port catheter.    After you go home:  Drink plenty of fluids.  Generally 6-8 (8 ounce) glasses a day is recommended.  Resume your regular diet unless otherwise ordered by a medical provider.  Keep any applied tape/gauze dressings clean and dry.  Change tape/gauze dressings if they get wet or soiled.  You may shower the following day after procedure, however cover and protect from moisture any tape/gauze dressings.  You may let water hit and run over dried skin glue, but do not scrub.  Pat the area dry after showering.  Port placement incisions are closed with absorbable suture, meaning they do not need to be removed at a later date, and a topical skin adhesive (skin glue).  This glue will wear off in 7-14 days.  Do not remove before this time.  If 14 days have passed and residual glue is present, you may gently remove it.  Do not apply gels, lotions, or ointments to the glue site for the first 10 days as this may cause the glue to prematurely soften and fail.  Do not perform strenuous activities or lift greater than 10 pounds for the next three days.  If there is bleeding or oozing from the procedure site, apply firm pressure to the area for 5-10 minutes.  If the bleeding continues seek medical advice at the numbers below.  Mild procedure site discomfort can be treated with an ice pack and over-the-counter pain relievers.        For 24 hours after any sedation used:  Relax and take it easy.  No strenuous activities.  Do not drive or operate machines at home or at work.  No alcohol consumption.  Do not make any  important or legal decisions.    Call our Interventional Radiology (IR) service if:  If you start bleeding from the procedure site.  If you do start to bleed from the site, lie down and hold some pressure on the site.  Our radiology provider can help you decide if you need to return to the hospital.  If you have new or worsening pain related to the procedure.  If you have concerning swelling at the procedure site.  If you develop persistent nausea or vomiting.  If you develop hives or a rash or any unexplained itching.  If you have a fever of greater than 100.5  F and chills in the first 5 days after procedure.  Any other concerns related to your procedure.      [facility]  Interventional Radiology (IR)  [address]  [address] [facility] [place] [address]    Contact Number:  [phone]  (IR control desk)  Monday - Friday 8:00 am - 4:30 pm    After hours for urgent concerns:  [phone]  After 4:30 pm Monday - Friday, Weekends and Holidays.   Ask for Interventional Radiology on-call.  Someone is available 24 hours a day.  [facility] toll free number:  [phone]

## 2022-08-12 NOTE — H&P
Patient presents for port placement with local anesthesia only.     Order states right breast biopsy was benign and right port placement is okay.    Plan on right port placement.    No interval change to H&P.    Consent obtained.

## 2022-08-16 DIAGNOSIS — C76.0 MALIGNANT NEOPLASM OF HEAD, FACE, AND NECK (H): Primary | ICD-10-CM

## 2022-08-18 NOTE — PROGRESS NOTES
Oncology Follow-up Visit  Aug 19, 2022    Reason for Visit: follow up of sebaceous cell carcinoma of eyelid    Oncology History: Jessica Zamorano is a 71 year old female  with past medical history of sebaceous cell carcinoma of the eyelid. She was evaluated initially in 2021. A biopsy was performed consistent with sebaceous cell carcinoma. She was evaluated at [facility] in 2021 where she underwent mapping biopsies of the tumor with plans for surgery. 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 Imaging with PET scan and orbital exenteration was recommended. She declined imaging or surgery and was lost to follow-up following the biopsy.     She was seen by Dr. Orozco 3/22/22 for evaluation with concern for lesion involving the lid, conjunctiva and cornea. Biopsy confirmed sebaceous cell carcinoma. Due to the extent of disease orbit sparing would not be possible.     PET/CT performed 3/31/22 showed only local disease with deep breast uptake. Mammogram and subsequent biopsy  7/19/22 negative for atypica or malignancy and positive for fibroadenoma.     She met with Dr. Dobson 7/12/22 to discuss potential treatment options. She again declined curative surgery therefore palliative chemotherapy with carboplatin/gemcitabine was recommended.    She presents today for evaluation prior to cycle 1 day 1 of gemcitabine (carboplatin will not be administered with first cycle as we are continuing to secure financial coverage of this).    Interval History:  -Jessica is feeling well overall today.  -Her left eye symptoms are stable. She has occasional clear discharge from left eye that is usually most noticeable upon waking and improves as the day goes on. When discharge is not present, she can see from the left but has some blurred vision. However, if she has discharge, vision from left eye is limited. She no longer uses moisturizing drops as they were not helpful. She continues to be unable to fully  open the left eye.  -Has a history of IBS which worsens with anxiety. BM's are regular currently, goes about once per day. Finds that if she avoids dairy and limits gluten that it is beneficial.   -She denies headaches or nausea. No new lumps or bumps.  -Otherwise, feeling well.     Review of Systems:  Patient denies any of the following except if noted above: fevers, chills, difficulty with energy, vision or hearing changes, chest pain, dyspnea, abdominal pain, nausea, vomiting, diarrhea, constipation, urinary concerns, headaches, numbness, tingling, issues with sleep or mood. He also denies lumps, bumps, rashes or skin lesions, bleeding or bruising issues.    Current Outpatient Medications   Medication     amLODIPine (NORVASC) 5 MG tablet     LORazepam (ATIVAN) 0.5 MG tablet     melatonin 1 MG TABS tablet     multivitamin w/minerals (THERA-VIT-M) tablet     acetaminophen (TYLENOL) 325 MG tablet     ondansetron (ZOFRAN) 8 MG tablet     prochlorperazine (COMPAZINE) 10 MG tablet     No current facility-administered medications for this visit.     Facility-Administered Medications Ordered in Other Visits   Medication     heparin 100 UNIT/ML injection 5 mL     sodium chloride (PF) 0.9% PF flush 3-20 mL       PHYSICAL EXAM:  BP (!) 155/81 (BP Location: Right arm, Patient Position: Sitting, Cuff Size: Adult Regular)   Resp 16   Wt 63.2 kg (139 lb 6.4 oz)   BMI 27.22 kg/m    General: Well-appearing female in no acute distress.  Eyes: Ptsosis of left upper eyelid, some scleral injection, scant amount of watery discharge present. EOMI, PERRL. No scleral icterus.  ENT: Oral mucosa is moist without lesions or thrush.   Lymphatic: Neck is supple without cervical or supraclavicular lymphadenopathy.   Cardiovascular: Regular rate and rhythm. No murmurs, gallops, or rubs. No peripheral edema.  Respiratory: Clear to auscultation bilaterally. No wheezes or crackles.  Gastrointestinal: Bowel sounds present. Abdomen soft,  non-tender. No palpable hepatosplenomegaly or masses.   Neurologic: Cranial nerves II through XII are grossly intact.  Skin: Port incision to right chest well-approximated, no erythema or drainage. No rashes, petechiae, or bruising noted on exposed skin.    Labs:   Most Recent 3 CBC's:  Recent Labs   Lab Test 08/19/22  1028 07/12/22  1542   WBC 7.8 7.3   HGB 14.5 14.9   MCV 93 94    221   ANEUTAUTO 4.8 5.3    Most Recent 3 BMP's:  Recent Labs   Lab Test 08/19/22  1028 07/12/22  1542    144   POTASSIUM 3.7 3.8   CHLORIDE 109 110*   CO2 26 23   BUN 22 19   CR 0.85 0.85   ANIONGAP 9 11   MARAL 8.8 9.0   GLC 82 99   PROTTOTAL 7.4 7.7   ALBUMIN 4.0 4.2    Most Recent 2 LFT's:  Recent Labs   Lab Test 08/19/22  1028 07/12/22  1542   AST 24 22   ALT 21 19   ALKPHOS 104 110   BILITOTAL 0.4 0.3    Most Recent TSH and T4:No lab results found.  Phos/Mag:No results found for: PHOS, MAG     I reviewed the above labs today.    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 Dobson the plan for palliative chemotherapy as patient would not want surgery. She also prefers to avoid alopecia and so will be treated with carboplatin/gemcitabine. Financial staff is working to secure coverage of carboplatin.    Will proceed with Cycle 1 day 1 of gemcitabine today but will not receive carboplatin today as financial coverage not secured yet. Will plan to start carboplatin with cycle 2.     Follow up with MIKIE, labs, and Cycle 1 Day 8 gemcitabine in 1 week.   Will continue with close follow up with plans for MIKIE visit on Cycle 2 day 1 and day 8 as well and adjust follow up going forward pending tolerance.       #Hypertension   -On amlodipine, tolerating well. BP slightly elevated today. Needs management by PCP but would like to set up with a provider who is closer to her home. Have reached out to RNCC to assist with this.     Not discussed today:  #Fibroadenoma on  recent diagnostic mammogram  -Incidental finding on PET scan  -Diagnostic mammogram showed:  A. RIGHT BREAST, 8:00 POSITION, 3 CM FROM NIPPLE, BIOPSY:  -Fibroadenoma, hyalinized intracanalicular pattern.  -Negative for atypia or malignancy.     -Will need annual mammograms      70 minutes spent on the date of the encounter doing chart review, review of test results, interpretation of tests, patient visit and documentation     Amber Scheierl, CNP    The patient was seen in conjunction with Amber Scheierl, CNP who served as a scribe for today's visit. I have reviewed and edited the above note, and agree with the above findings and plan.      Katy Ibanez CNP  [facility] [address] [address] [phone]

## 2022-08-19 ENCOUNTER — INFUSION THERAPY VISIT (OUTPATIENT)
Dept: ONCOLOGY | Facility: CLINIC | Age: 72
End: 2022-08-19
Attending: INTERNAL MEDICINE
Payer: COMMERCIAL

## 2022-08-19 ENCOUNTER — APPOINTMENT (OUTPATIENT)
Dept: LAB | Facility: CLINIC | Age: 72
End: 2022-08-19
Attending: REGISTERED NURSE
Payer: COMMERCIAL

## 2022-08-19 ENCOUNTER — ONCOLOGY VISIT (OUTPATIENT)
Dept: ONCOLOGY | Facility: CLINIC | Age: 72
End: 2022-08-19
Attending: REGISTERED NURSE
Payer: COMMERCIAL

## 2022-08-19 ENCOUNTER — TELEPHONE (OUTPATIENT)
Dept: ONCOLOGY | Facility: CLINIC | Age: 72
End: 2022-08-19

## 2022-08-19 VITALS
DIASTOLIC BLOOD PRESSURE: 78 MMHG | SYSTOLIC BLOOD PRESSURE: 134 MMHG | TEMPERATURE: 98.8 F | HEART RATE: 75 BPM | RESPIRATION RATE: 16 BRPM

## 2022-08-19 VITALS
BODY MASS INDEX: 27.22 KG/M2 | DIASTOLIC BLOOD PRESSURE: 81 MMHG | WEIGHT: 139.4 LBS | RESPIRATION RATE: 16 BRPM | SYSTOLIC BLOOD PRESSURE: 155 MMHG

## 2022-08-19 DIAGNOSIS — I10 UNCONTROLLED HYPERTENSION: ICD-10-CM

## 2022-08-19 DIAGNOSIS — C76.0 MALIGNANT NEOPLASM OF HEAD, FACE, AND NECK (H): Primary | ICD-10-CM

## 2022-08-19 DIAGNOSIS — C44.131 SEBACEOUS CELL CARCINOMA OF SKIN OF EYELID, INCLUDING CANTHUS: Primary | ICD-10-CM

## 2022-08-19 DIAGNOSIS — C76.0 MALIGNANT NEOPLASM OF HEAD, FACE, AND NECK (H): ICD-10-CM

## 2022-08-19 LAB
ALBUMIN SERPL-MCNC: 4 G/DL (ref 3.4–5)
ALP SERPL-CCNC: 104 U/L (ref 40–150)
ALT SERPL W P-5'-P-CCNC: 21 U/L (ref 0–50)
ANION GAP SERPL CALCULATED.3IONS-SCNC: 9 MMOL/L (ref 3–14)
AST SERPL W P-5'-P-CCNC: 24 U/L (ref 0–45)
BASOPHILS # BLD AUTO: 0.1 10E3/UL (ref 0–0.2)
BASOPHILS NFR BLD AUTO: 1 %
BILIRUB SERPL-MCNC: 0.4 MG/DL (ref 0.2–1.3)
BUN SERPL-MCNC: 22 MG/DL (ref 7–30)
CALCIUM SERPL-MCNC: 8.8 MG/DL (ref 8.5–10.1)
CHLORIDE BLD-SCNC: 109 MMOL/L (ref 94–109)
CO2 SERPL-SCNC: 26 MMOL/L (ref 20–32)
CREAT SERPL-MCNC: 0.85 MG/DL (ref 0.52–1.04)
EOSINOPHIL # BLD AUTO: 0.1 10E3/UL (ref 0–0.7)
EOSINOPHIL NFR BLD AUTO: 1 %
ERYTHROCYTE [DISTWIDTH] IN BLOOD BY AUTOMATED COUNT: 12.3 % (ref 10–15)
GFR SERPL CREATININE-BSD FRML MDRD: 72 ML/MIN/1.73M2
GLUCOSE BLD-MCNC: 82 MG/DL (ref 70–99)
HCT VFR BLD AUTO: 43.4 % (ref 35–47)
HGB BLD-MCNC: 14.5 G/DL (ref 11.7–15.7)
HOLD SPECIMEN: NORMAL
IMM GRANULOCYTES # BLD: 0 10E3/UL
IMM GRANULOCYTES NFR BLD: 0 %
LYMPHOCYTES # BLD AUTO: 2.1 10E3/UL (ref 0.8–5.3)
LYMPHOCYTES NFR BLD AUTO: 27 %
MCH RBC QN AUTO: 30.9 PG (ref 26.5–33)
MCHC RBC AUTO-ENTMCNC: 33.4 G/DL (ref 31.5–36.5)
MCV RBC AUTO: 93 FL (ref 78–100)
MONOCYTES # BLD AUTO: 0.7 10E3/UL (ref 0–1.3)
MONOCYTES NFR BLD AUTO: 9 %
NEUTROPHILS # BLD AUTO: 4.8 10E3/UL (ref 1.6–8.3)
NEUTROPHILS NFR BLD AUTO: 62 %
NRBC # BLD AUTO: 0 10E3/UL
NRBC BLD AUTO-RTO: 0 /100
PLATELET # BLD AUTO: 216 10E3/UL (ref 150–450)
POTASSIUM BLD-SCNC: 3.7 MMOL/L (ref 3.4–5.3)
PROT SERPL-MCNC: 7.4 G/DL (ref 6.8–8.8)
RBC # BLD AUTO: 4.69 10E6/UL (ref 3.8–5.2)
SODIUM SERPL-SCNC: 144 MMOL/L (ref 133–144)
WBC # BLD AUTO: 7.8 10E3/UL (ref 4–11)

## 2022-08-19 PROCEDURE — 36591 DRAW BLOOD OFF VENOUS DEVICE: CPT | Performed by: REGISTERED NURSE

## 2022-08-19 PROCEDURE — G0463 HOSPITAL OUTPT CLINIC VISIT: HCPCS

## 2022-08-19 PROCEDURE — 96413 CHEMO IV INFUSION 1 HR: CPT

## 2022-08-19 PROCEDURE — 250N000011 HC RX IP 250 OP 636: Performed by: INTERNAL MEDICINE

## 2022-08-19 PROCEDURE — 80053 COMPREHEN METABOLIC PANEL: CPT | Performed by: REGISTERED NURSE

## 2022-08-19 PROCEDURE — 96367 TX/PROPH/DG ADDL SEQ IV INF: CPT

## 2022-08-19 PROCEDURE — 85014 HEMATOCRIT: CPT | Performed by: REGISTERED NURSE

## 2022-08-19 PROCEDURE — 85041 AUTOMATED RBC COUNT: CPT | Performed by: REGISTERED NURSE

## 2022-08-19 PROCEDURE — 258N000003 HC RX IP 258 OP 636: Performed by: INTERNAL MEDICINE

## 2022-08-19 PROCEDURE — 99417 PROLNG OP E/M EACH 15 MIN: CPT | Performed by: REGISTERED NURSE

## 2022-08-19 PROCEDURE — 99215 OFFICE O/P EST HI 40 MIN: CPT | Performed by: REGISTERED NURSE

## 2022-08-19 RX ORDER — PROCHLORPERAZINE MALEATE 10 MG
10 TABLET ORAL EVERY 6 HOURS PRN
Qty: 30 TABLET | Refills: 5 | Status: SHIPPED | OUTPATIENT
Start: 2022-08-19 | End: 2022-09-30

## 2022-08-19 RX ORDER — HEPARIN SODIUM (PORCINE) LOCK FLUSH IV SOLN 100 UNIT/ML 100 UNIT/ML
5 SOLUTION INTRAVENOUS
Status: DISCONTINUED | OUTPATIENT
Start: 2022-08-19 | End: 2022-08-19 | Stop reason: HOSPADM

## 2022-08-19 RX ORDER — ONDANSETRON 8 MG/1
8 TABLET, FILM COATED ORAL EVERY 8 HOURS PRN
Qty: 10 TABLET | Refills: 5 | Status: SHIPPED | OUTPATIENT
Start: 2022-08-19 | End: 2022-09-30

## 2022-08-19 RX ADMIN — SODIUM CHLORIDE 250 ML: 9 INJECTION, SOLUTION INTRAVENOUS at 12:39

## 2022-08-19 RX ADMIN — DEXAMETHASONE SODIUM PHOSPHATE: 10 INJECTION, SOLUTION INTRAMUSCULAR; INTRAVENOUS at 12:49

## 2022-08-19 RX ADMIN — Medication 5 ML: at 14:00

## 2022-08-19 RX ADMIN — GEMCITABINE 1600 MG: 38 INJECTION, SOLUTION INTRAVENOUS at 13:26

## 2022-08-19 ASSESSMENT — PAIN SCALES - GENERAL: PAINLEVEL: NO PAIN (0)

## 2022-08-19 NOTE — NURSING NOTE
Oncology Rooming Note    August 19, 2022 10:41 AM   Jessica Zamorano is a 71 year old female who presents for:    Chief Complaint   Patient presents with     Blood Draw     Labs drawn via  by RN. BP taken.     Oncology Clinic Visit     Sebaceous Cell Carcinoma of skin of eyelid     Initial Vitals: BP (!) 155/81 (BP Location: Right arm, Patient Position: Sitting, Cuff Size: Adult Regular)   Resp 16   Wt 63.2 kg (139 lb 6.4 oz)   BMI 27.22 kg/m   Estimated body mass index is 27.22 kg/m  as calculated from the following:    Height as of 8/12/22: 1.524 m (5').    Weight as of this encounter: 63.2 kg (139 lb 6.4 oz). Body surface area is 1.64 meters squared.  No Pain (0) Comment: Data Unavailable   No LMP recorded. Patient is postmenopausal.  Allergies reviewed: Yes  Medications reviewed: Yes    Medications: Medication refills not needed today.  Pharmacy name entered into [employer]: [facility] [address] [place] [address].    Clinical concerns:        Andria Jimenes CMA

## 2022-08-19 NOTE — PROGRESS NOTES
Infusion Nursing Note:  Jessica Zamorano presents today for C1D1 Gemzar.    Patient seen by provider today: Yes: Katy Ibanez NP   present during visit today: Not Applicable.    Note: Patient reported to clinic today with no new complaints or concerns after seeing provider.     Patient new to oncology infusion room and is receiving Gemzar for the first time. Pt oriented to infusion room and call light. Chemotherapy teaching done previously by RNCC.  Reinforced chemotherapy teaching/side effects and schedule during infusion visit.     Copy of AVS reviewed with patient. Pt instructed to call care coordinator, triage (or MD on call if after hours/weekends) with chills/temp >=100.4, questions/concerns. Pt stated understanding of plan.     Intravenous Access:  Implanted Port.    Treatment Conditions:  Lab Results   Component Value Date    HGB 14.5 08/19/2022    WBC 7.8 08/19/2022    ANEUTAUTO 4.8 08/19/2022     08/19/2022      Lab Results   Component Value Date     08/19/2022    POTASSIUM 3.7 08/19/2022    CR 0.85 08/19/2022    MARAL 8.8 08/19/2022    BILITOTAL 0.4 08/19/2022    ALBUMIN 4.0 08/19/2022    ALT 21 08/19/2022    AST 24 08/19/2022     Results reviewed, labs MET treatment parameters, ok to proceed with treatment.    Post Infusion Assessment:  Patient tolerated infusion without incident.  Blood return noted pre and post infusion.  Site patent and intact, free from redness, edema or discomfort.  No evidence of extravasations.  Access discontinued per protocol.     Discharge Plan:   Prescription refills given for Compazine and Zofran.  Discharge instructions reviewed with: Patient.  Patient and/or family verbalized understanding of discharge instructions and all questions answered.  Copy of AVS reviewed with patient and/or family.  Patient will return 8/25/22 for next appointment.  Patient discharged in stable condition accompanied by: self.  Departure Mode: Ambulatory.  Face to Face time: 20+  minutes.    Jeremy Mcduffie RN

## 2022-08-19 NOTE — TELEPHONE ENCOUNTER
PA Initiation    Medication: Ondansetron-Pending  Insurance Company: [employer] - Phone [phone] Fax [phone]  Pharmacy Filling the Rx:    Filling Pharmacy Phone:    Filling Pharmacy Fax:    Start Date: 8/19/2022

## 2022-08-19 NOTE — NURSING NOTE
Chief Complaint   Patient presents with     Blood Draw     Labs drawn via  by RN. BP taken.     Labs drawn with  by RN. BP and weight taken. Remainder of vitals to be done in clinic. Patient checked into next appointment.    Shasta Gray RN

## 2022-08-19 NOTE — PATIENT INSTRUCTIONS
[facility] Main Line: [phone]    Call triage nurse with chills and/or temperature greater than or equal to 100.4, uncontrolled nausea/vomiting, diarrhea, constipation, dizziness, shortness of breath, chest pain, bleeding, unexplained bruising, or any new/concerning symptoms, questions/concerns.   If you are having any concerning symptoms or wish to speak to a provider before your next infusion visit, please call your care coordinator or triage to notify them so we can adequately serve you.   Nurse Triage line:  [phone]    If after hours, weekends, or holidays, call main hospital  and ask for Oncology doctor on call @ [phone]      Latest Reference Range & Units 08/19/22 10:28   Sodium 133 - 144 mmol/L 144   Potassium 3.4 - 5.3 mmol/L 3.7   Chloride 94 - 109 mmol/L 109   Carbon Dioxide 20 - 32 mmol/L 26   Urea Nitrogen 7 - 30 mg/dL 22   Creatinine 0.52 - 1.04 mg/dL 0.85   GFR Estimate >60 mL/min/1.73m2 72   Calcium 8.5 - 10.1 mg/dL 8.8   Anion Gap 3 - 14 mmol/L 9   Albumin 3.4 - 5.0 g/dL 4.0   Protein Total 6.8 - 8.8 g/dL 7.4   Alkaline Phosphatase 40 - 150 U/L 104   ALT 0 - 50 U/L 21   AST 0 - 45 U/L 24   Bilirubin Total 0.2 - 1.3 mg/dL 0.4   Glucose 70 - 99 mg/dL 82   WBC 4.0 - 11.0 10e3/uL 7.8   Hemoglobin 11.7 - 15.7 g/dL 14.5   Hematocrit 35.0 - 47.0 % 43.4   Platelet Count 150 - 450 10e3/uL 216   RBC Count 3.80 - 5.20 10e6/uL 4.69   MCV 78 - 100 fL 93   MCH 26.5 - 33.0 pg 30.9   MCHC 31.5 - 36.5 g/dL 33.4   RDW 10.0 - 15.0 % 12.3   % Neutrophils % 62   % Lymphocytes % 27   % Monocytes % 9   % Eosinophils % 1   % Basophils % 1   Absolute Basophils 0.0 - 0.2 10e3/uL 0.1   Absolute Eosinophils 0.0 - 0.7 10e3/uL 0.1   Absolute Immature Granulocytes <=0.4 10e3/uL 0.0   Absolute Lymphocytes 0.8 - 5.3 10e3/uL 2.1   Absolute Monocytes 0.0 - 1.3 10e3/uL 0.7   % Immature Granulocytes % 0   Absolute Neutrophils 1.6 - 8.3 10e3/uL 4.8   Absolute NRBCs 10e3/uL 0.0   NRBCs per 100 WBC <1  /100 0

## 2022-08-24 NOTE — PROGRESS NOTES
Oncology Follow-up Visit  Aug 25, 2022    Reason for Visit: follow up of sebaceous cell carcinoma of eyelid    Oncology History: Jessica Zamorano is a 71 year old female  with past medical history of sebaceous cell carcinoma of the eyelid. She was evaluated initially in 2021. A biopsy was performed consistent with sebaceous cell carcinoma. She was evaluated at [facility] in 2021 where she underwent mapping biopsies of the tumor with plans for surgery. 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 Imaging with PET scan and orbital exenteration was recommended. She declined imaging or surgery and was lost to follow-up following the biopsy.     She was seen by Dr. Orozco 3/22/22 for evaluation with concern for lesion involving the lid, conjunctiva and cornea. Biopsy confirmed sebaceous cell carcinoma. Due to the extent of disease orbit sparing would not be possible.     PET/CT performed 3/31/22 showed only local disease with deep breast uptake. Mammogram and subsequent biopsy  7/19/22 negative for atypica or malignancy and positive for fibroadenoma.     She met with Dr. Dobson 7/12/22 to discuss potential treatment options. She again declined curative surgery therefore palliative chemotherapy with carboplatin/gemcitabine was recommended.    She presents today for evaluation prior to C1D8 gemcitabine (carboplatin to begin with cycle 2).    Interval History:  Jessica feels she tolerated her first dose of gemcitabine well. She did experience constipation which was relieved with MiraLAX and dulcolax. Appetite is also somewhat decreased. She had a few, transient episodes of nausea. Did not require antiemetics. Her last nausea episodes was relieved by eating a few soda crackers. She reports increased drainage from the eye with worsening cloudy vision that lasted a few days this past week although this has now improved.    Review of Systems:  10 point ROS neg other than the symptoms noted above in  the HPI.      Current Outpatient Medications   Medication     acetaminophen (TYLENOL) 325 MG tablet     amLODIPine (NORVASC) 5 MG tablet     lidocaine-prilocaine (EMLA) 2.5-2.5 % external cream     LORazepam (ATIVAN) 0.5 MG tablet     melatonin 1 MG TABS tablet     multivitamin w/minerals (THERA-VIT-M) tablet     ondansetron (ZOFRAN) 8 MG tablet     prochlorperazine (COMPAZINE) 10 MG tablet     No current facility-administered medications for this visit.       PHYSICAL EXAM:  /80 (BP Location: Right arm, Patient Position: Sitting, Cuff Size: Adult Regular)   Pulse 90   Temp 98.2  F (36.8  C) (Oral)   Resp 16   Wt 62.1 kg (136 lb 12.8 oz)   SpO2 96%   BMI 26.72 kg/m    General: Well-appearing female in no acute distress.  Eyes: Ptsosis of left upper eyelid, some scleral injection. EOMI, PERRL. No scleral icterus.  ENT: Oral exam deferred.  Cardiovascular: Regular rate and rhythm. No murmurs, gallops, or rubs. No peripheral edema.  Respiratory: Clear to auscultation bilaterally. No wheezes or crackles.  Neurologic: No focal deficits. Unable to thoroughly examine left eye movement.  Skin: Port incision to right chest accessed.No rashes, petechiae, or bruising noted on exposed skin.    Labs:   Most Recent 3 CBC's:  Recent Labs   Lab Test 08/25/22  1337 08/19/22  1028 07/12/22  1542   WBC 4.6 7.8 7.3   HGB 13.5 14.5 14.9   MCV 93 93 94    216 221   ANEUTAUTO 2.8 4.8 5.3    Most Recent 3 BMP's:  Recent Labs   Lab Test 08/19/22  1028 07/12/22  1542    144   POTASSIUM 3.7 3.8   CHLORIDE 109 110*   CO2 26 23   BUN 22 19   CR 0.85 0.85   ANIONGAP 9 11   MARAL 8.8 9.0   GLC 82 99   PROTTOTAL 7.4 7.7   ALBUMIN 4.0 4.2    Most Recent 2 LFT's:  Recent Labs   Lab Test 08/19/22  1028 07/12/22  1542   AST 24 22   ALT 21 19   ALKPHOS 104 110   BILITOTAL 0.4 0.3    Most Recent TSH and T4:No lab results found.  Phos/Mag:No results found for: PHOS, MAG     I reviewed the above labs today.    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 Dobson the plan for palliative chemotherapy as patient would not want surgery. She also prefers to avoid alopecia and so will be treated with carboplatin/gemcitabine. Began cycle 1 8/19/22 with gemcitabine alone (carboplatin omitted due to need to secure financial coverage). Tolerated first treatment well with mild constipation, appetite changes and nausea.  -Labs and exam acceptable for day 8 gemzar today  -RTC in 2 weeks for cycle 2 with addition of carboplatin (financial coverage now secure)  -EMLA prescribed for pain with port access    #Constipation  Continue prunes, MiraLAX or dulcolax PRN    #Nausea  Mild with gemzar alone. May require antiemetics with addition of carbo which she has at home    #Hypertension   -On amlodipine, tolerating well. BP slightly elevated today. Needs management by PCP but would like to set up with a provider who is closer to her home. Have reached out to RNCC to assist with this.     #Fibroadenoma on recent diagnostic mammogram  -Incidental finding on PET scan  -Diagnostic mammogram showed:  A. RIGHT BREAST, 8:00 POSITION, 3 CM FROM NIPPLE, BIOPSY:  -Fibroadenoma, hyalinized intracanalicular pattern.  -Negative for atypia or malignancy.   Will need annual mammograms    30 minutes spent on the date of the encounter doing chart review, review of test results, interpretation of tests, patient visit and documentation     Katy Ibanez CNP  [facility]  [address] [place] [id number]  [phone]

## 2022-08-25 ENCOUNTER — APPOINTMENT (OUTPATIENT)
Dept: LAB | Facility: CLINIC | Age: 72
End: 2022-08-25
Attending: INTERNAL MEDICINE
Payer: COMMERCIAL

## 2022-08-25 ENCOUNTER — INFUSION THERAPY VISIT (OUTPATIENT)
Dept: ONCOLOGY | Facility: CLINIC | Age: 72
End: 2022-08-25
Attending: INTERNAL MEDICINE
Payer: COMMERCIAL

## 2022-08-25 ENCOUNTER — ONCOLOGY VISIT (OUTPATIENT)
Dept: ONCOLOGY | Facility: CLINIC | Age: 72
End: 2022-08-25
Attending: REGISTERED NURSE
Payer: COMMERCIAL

## 2022-08-25 VITALS
BODY MASS INDEX: 26.72 KG/M2 | TEMPERATURE: 98.2 F | RESPIRATION RATE: 16 BRPM | WEIGHT: 136.8 LBS | HEART RATE: 90 BPM | OXYGEN SATURATION: 96 % | DIASTOLIC BLOOD PRESSURE: 80 MMHG | SYSTOLIC BLOOD PRESSURE: 139 MMHG

## 2022-08-25 DIAGNOSIS — C76.0 MALIGNANT NEOPLASM OF HEAD, FACE, AND NECK (H): Primary | ICD-10-CM

## 2022-08-25 DIAGNOSIS — T45.1X5A CHEMOTHERAPY-INDUCED NAUSEA: ICD-10-CM

## 2022-08-25 DIAGNOSIS — K59.00 CONSTIPATION, UNSPECIFIED CONSTIPATION TYPE: ICD-10-CM

## 2022-08-25 DIAGNOSIS — Z78.9 PROBLEM WITH VASCULAR ACCESS: ICD-10-CM

## 2022-08-25 DIAGNOSIS — I10 UNCONTROLLED HYPERTENSION: ICD-10-CM

## 2022-08-25 DIAGNOSIS — C44.131 SEBACEOUS CELL CARCINOMA OF SKIN OF EYELID, INCLUDING CANTHUS: Primary | ICD-10-CM

## 2022-08-25 DIAGNOSIS — R11.0 CHEMOTHERAPY-INDUCED NAUSEA: ICD-10-CM

## 2022-08-25 LAB
BASOPHILS # BLD AUTO: 0.1 10E3/UL (ref 0–0.2)
BASOPHILS NFR BLD AUTO: 2 %
EOSINOPHIL # BLD AUTO: 0.1 10E3/UL (ref 0–0.7)
EOSINOPHIL NFR BLD AUTO: 1 %
ERYTHROCYTE [DISTWIDTH] IN BLOOD BY AUTOMATED COUNT: 11.9 % (ref 10–15)
HCT VFR BLD AUTO: 41 % (ref 35–47)
HGB BLD-MCNC: 13.5 G/DL (ref 11.7–15.7)
IMM GRANULOCYTES # BLD: 0 10E3/UL
IMM GRANULOCYTES NFR BLD: 0 %
LYMPHOCYTES # BLD AUTO: 1.5 10E3/UL (ref 0.8–5.3)
LYMPHOCYTES NFR BLD AUTO: 33 %
MCH RBC QN AUTO: 30.6 PG (ref 26.5–33)
MCHC RBC AUTO-ENTMCNC: 32.9 G/DL (ref 31.5–36.5)
MCV RBC AUTO: 93 FL (ref 78–100)
MONOCYTES # BLD AUTO: 0.1 10E3/UL (ref 0–1.3)
MONOCYTES NFR BLD AUTO: 3 %
NEUTROPHILS # BLD AUTO: 2.8 10E3/UL (ref 1.6–8.3)
NEUTROPHILS NFR BLD AUTO: 61 %
NRBC # BLD AUTO: 0 10E3/UL
NRBC BLD AUTO-RTO: 0 /100
PLATELET # BLD AUTO: 182 10E3/UL (ref 150–450)
RBC # BLD AUTO: 4.41 10E6/UL (ref 3.8–5.2)
WBC # BLD AUTO: 4.6 10E3/UL (ref 4–11)

## 2022-08-25 PROCEDURE — 85025 COMPLETE CBC W/AUTO DIFF WBC: CPT | Performed by: INTERNAL MEDICINE

## 2022-08-25 PROCEDURE — 96375 TX/PRO/DX INJ NEW DRUG ADDON: CPT

## 2022-08-25 PROCEDURE — 250N000011 HC RX IP 250 OP 636: Performed by: REGISTERED NURSE

## 2022-08-25 PROCEDURE — 250N000011 HC RX IP 250 OP 636: Performed by: INTERNAL MEDICINE

## 2022-08-25 PROCEDURE — 99214 OFFICE O/P EST MOD 30 MIN: CPT | Performed by: REGISTERED NURSE

## 2022-08-25 PROCEDURE — G0463 HOSPITAL OUTPT CLINIC VISIT: HCPCS

## 2022-08-25 PROCEDURE — 96413 CHEMO IV INFUSION 1 HR: CPT

## 2022-08-25 PROCEDURE — 258N000003 HC RX IP 258 OP 636: Performed by: INTERNAL MEDICINE

## 2022-08-25 RX ORDER — HEPARIN SODIUM (PORCINE) LOCK FLUSH IV SOLN 100 UNIT/ML 100 UNIT/ML
5 SOLUTION INTRAVENOUS ONCE
Status: COMPLETED | OUTPATIENT
Start: 2022-08-25 | End: 2022-08-25

## 2022-08-25 RX ORDER — HEPARIN SODIUM (PORCINE) LOCK FLUSH IV SOLN 100 UNIT/ML 100 UNIT/ML
5 SOLUTION INTRAVENOUS
Status: DISCONTINUED | OUTPATIENT
Start: 2022-08-25 | End: 2022-08-25 | Stop reason: HOSPADM

## 2022-08-25 RX ORDER — LIDOCAINE/PRILOCAINE 2.5 %-2.5%
CREAM (GRAM) TOPICAL PRN
Qty: 30 G | Refills: 1 | Status: SHIPPED | OUTPATIENT
Start: 2022-08-25 | End: 2022-09-30

## 2022-08-25 RX ADMIN — DEXAMETHASONE SODIUM PHOSPHATE 12 MG: 10 INJECTION, SOLUTION INTRAMUSCULAR; INTRAVENOUS at 15:57

## 2022-08-25 RX ADMIN — Medication 5 ML: at 16:56

## 2022-08-25 RX ADMIN — SODIUM CHLORIDE 250 ML: 9 INJECTION, SOLUTION INTRAVENOUS at 15:55

## 2022-08-25 RX ADMIN — Medication 5 ML: at 13:26

## 2022-08-25 RX ADMIN — GEMCITABINE 1600 MG: 38 INJECTION, SOLUTION INTRAVENOUS at 16:19

## 2022-08-25 ASSESSMENT — PAIN SCALES - GENERAL: PAINLEVEL: NO PAIN (0)

## 2022-08-25 NOTE — PROGRESS NOTES
Infusion Nursing Note:  Jessica Zamorano presents today for Cycle 1 Day 8 Gemzar.    Patient seen by provider today: Yes: Katy Ibanez NP   present during visit today: Not Applicable.    Note:   Confirmed with Katy Ibaenz NP that patient tolerated dexamethasone premed last time and it is OK to give dexamethasone today.    Intravenous Access:  Implanted Port.    Treatment Conditions:  Results reviewed, labs MET treatment parameters, ok to proceed with treatment.    Post Infusion Assessment:  Patient tolerated infusion without incident.  Blood return noted pre and post infusion.  Site patent and intact, free from redness, edema or discomfort.  No evidence of extravasations.  Access discontinued per protocol.     Discharge Plan:   Emla prescription requires prior authorization.  Pharmacy will contact patient when the medication is ready.  Discharge instructions reviewed with: Patient.  Patient and/or family verbalized understanding of discharge instructions and all questions answered.  Copy of AVS reviewed with patient and/or family.  Patient will return 9/9/22 for next appointment.  Patient discharged in stable condition accompanied by: self.  Departure Mode: Ambulatory.      Erica Looney RN

## 2022-08-25 NOTE — PATIENT INSTRUCTIONS
Contact Numbers  [facility]: [phone] (for symptom and scheduling needs)    Please call the [facility] Triage line if you experience a temperature greater than or equal to 100.4, shaking chills, have uncontrolled nausea, vomiting and/or diarrhea, dizziness, shortness of breath, chest pain, bleeding, unexplained bruising, or if you have any other new/concerning symptoms, questions or concerns.     If you are having any concerning symptoms or wish to speak to a provider before your next infusion visit, please call your care coordinator or triage to notify them so we can adequately serve you.     If you need a refill on a narcotic prescription or other medication, please call triage before your infusion appointment.

## 2022-08-25 NOTE — NURSING NOTE
Oncology Rooming Note    August 25, 2022 2:18 PM   Jessica Zamorano is a 71 year old female who presents for:    Chief Complaint   Patient presents with     Port Draw     Labs drawn via port by RN. Vitals taken.     Oncology Clinic Visit     SCC      Initial Vitals: /80 (BP Location: Right arm, Patient Position: Sitting, Cuff Size: Adult Regular)   Pulse 90   Temp 98.2  F (36.8  C) (Oral)   Resp 16   Wt 62.1 kg (136 lb 12.8 oz)   SpO2 96%   BMI 26.72 kg/m   Estimated body mass index is 26.72 kg/m  as calculated from the following:    Height as of 8/12/22: 1.524 m (5').    Weight as of this encounter: 62.1 kg (136 lb 12.8 oz). Body surface area is 1.62 meters squared.  No Pain (0) Comment: Data Unavailable   No LMP recorded. Patient is postmenopausal.  Allergies reviewed: Yes  Medications reviewed: Yes    Medications: Medication refills not needed today.  Pharmacy name entered into [facility]: [facility] [address] [place] [address] - [address] [place] [address].    Clinical concerns: Pt would like to discuss using emla cream.   Katy was notified.      Andria Jimenes CMA

## 2022-08-25 NOTE — NURSING NOTE
"Chief Complaint   Patient presents with     Port Draw     Labs drawn via port by RN. Vitals taken.     Labs drawn via port by RN. Port accessed with 20G 3/4\" gripper needle. Flushed with NS and heparin. Pt tolerated well. Vitals taken. Pt checked in for next appointment.    Shasta Gray RN  "

## 2022-08-28 ENCOUNTER — HOSPITAL ENCOUNTER (EMERGENCY)
Facility: CLINIC | Age: 72
Discharge: HOME | End: 2022-08-28
Attending: EMERGENCY MEDICINE | Admitting: EMERGENCY MEDICINE
Payer: COMMERCIAL

## 2022-08-28 ENCOUNTER — APPOINTMENT (OUTPATIENT)
Dept: GENERAL RADIOLOGY | Facility: CLINIC | Age: 72
End: 2022-08-28
Attending: EMERGENCY MEDICINE
Payer: COMMERCIAL

## 2022-08-28 ENCOUNTER — NURSE TRIAGE (OUTPATIENT)
Dept: NURSING | Facility: CLINIC | Age: 72
End: 2022-08-28

## 2022-08-28 VITALS
HEART RATE: 81 BPM | WEIGHT: 138 LBS | SYSTOLIC BLOOD PRESSURE: 130 MMHG | HEIGHT: 63 IN | TEMPERATURE: 98.6 F | OXYGEN SATURATION: 100 % | BODY MASS INDEX: 24.45 KG/M2 | DIASTOLIC BLOOD PRESSURE: 84 MMHG | RESPIRATION RATE: 15 BRPM

## 2022-08-28 DIAGNOSIS — R50.9 FEVER AND CHILLS: ICD-10-CM

## 2022-08-28 DIAGNOSIS — R51.9 NONINTRACTABLE HEADACHE, UNSPECIFIED CHRONICITY PATTERN, UNSPECIFIED HEADACHE TYPE: ICD-10-CM

## 2022-08-28 LAB
ALBUMIN SERPL-MCNC: 3.4 G/DL (ref 3.4–5)
ALBUMIN UR-MCNC: NEGATIVE MG/DL
ALP SERPL-CCNC: 78 U/L (ref 40–150)
ALT SERPL W P-5'-P-CCNC: 24 U/L (ref 0–50)
ANION GAP SERPL CALCULATED.3IONS-SCNC: 5 MMOL/L (ref 3–14)
APPEARANCE UR: CLEAR
AST SERPL W P-5'-P-CCNC: 17 U/L (ref 0–45)
ATRIAL RATE - MUSE: 84 BPM
BASOPHILS # BLD MANUAL: 0 10E3/UL (ref 0–0.2)
BASOPHILS NFR BLD MANUAL: 0 %
BILIRUB SERPL-MCNC: 1.3 MG/DL (ref 0.2–1.3)
BILIRUB UR QL STRIP: NEGATIVE
BUN SERPL-MCNC: 15 MG/DL (ref 7–30)
CALCIUM SERPL-MCNC: 8.9 MG/DL (ref 8.5–10.1)
CHLORIDE BLD-SCNC: 106 MMOL/L (ref 94–109)
CO2 SERPL-SCNC: 24 MMOL/L (ref 20–32)
COLOR UR AUTO: ABNORMAL
CREAT SERPL-MCNC: 0.83 MG/DL (ref 0.52–1.04)
DIASTOLIC BLOOD PRESSURE - MUSE: NORMAL MMHG
EOSINOPHIL # BLD MANUAL: 0.3 10E3/UL (ref 0–0.7)
EOSINOPHIL NFR BLD MANUAL: 2 %
ERYTHROCYTE [DISTWIDTH] IN BLOOD BY AUTOMATED COUNT: 11.8 % (ref 10–15)
FLUAV RNA SPEC QL NAA+PROBE: NEGATIVE
FLUBV RNA RESP QL NAA+PROBE: NEGATIVE
GFR SERPL CREATININE-BSD FRML MDRD: 74 ML/MIN/1.73M2
GLUCOSE BLD-MCNC: 98 MG/DL (ref 70–99)
GLUCOSE UR STRIP-MCNC: NEGATIVE MG/DL
HCO3 BLDV-SCNC: 25 MMOL/L (ref 21–28)
HCT VFR BLD AUTO: 36.8 % (ref 35–47)
HGB BLD-MCNC: 12.3 G/DL (ref 11.7–15.7)
HGB UR QL STRIP: ABNORMAL
HOLD SPECIMEN: NORMAL
HOLD SPECIMEN: NORMAL
INTERPRETATION ECG - MUSE: NORMAL
KETONES UR STRIP-MCNC: NEGATIVE MG/DL
LACTATE BLD-SCNC: 0.6 MMOL/L
LEUKOCYTE ESTERASE UR QL STRIP: NEGATIVE
LYMPHOCYTES # BLD MANUAL: 0.8 10E3/UL (ref 0.8–5.3)
LYMPHOCYTES NFR BLD MANUAL: 6 %
MCH RBC QN AUTO: 31.5 PG (ref 26.5–33)
MCHC RBC AUTO-ENTMCNC: 33.4 G/DL (ref 31.5–36.5)
MCV RBC AUTO: 94 FL (ref 78–100)
MONOCYTES # BLD MANUAL: 0 10E3/UL (ref 0–1.3)
MONOCYTES NFR BLD MANUAL: 0 %
NEUTROPHILS # BLD MANUAL: 12.4 10E3/UL (ref 1.6–8.3)
NEUTROPHILS NFR BLD MANUAL: 92 %
NITRATE UR QL: NEGATIVE
P AXIS - MUSE: 60 DEGREES
PCO2 BLDV: 40 MM HG (ref 40–50)
PH BLDV: 7.41 [PH] (ref 7.32–7.43)
PH UR STRIP: 6 [PH] (ref 5–7)
PLAT MORPH BLD: ABNORMAL
PLATELET # BLD AUTO: 108 10E3/UL (ref 150–450)
PO2 BLDV: 28 MM HG (ref 25–47)
POTASSIUM BLD-SCNC: 3.3 MMOL/L (ref 3.4–5.3)
PR INTERVAL - MUSE: 172 MS
PROT SERPL-MCNC: 7 G/DL (ref 6.8–8.8)
QRS DURATION - MUSE: 82 MS
QT - MUSE: 366 MS
QTC - MUSE: 432 MS
R AXIS - MUSE: 38 DEGREES
RBC # BLD AUTO: 3.91 10E6/UL (ref 3.8–5.2)
RBC MORPH BLD: ABNORMAL
RBC URINE: 1 /HPF
RSV RNA SPEC NAA+PROBE: NEGATIVE
SAO2 % BLDV: 54 % (ref 94–100)
SARS-COV-2 RNA RESP QL NAA+PROBE: NEGATIVE
SODIUM SERPL-SCNC: 135 MMOL/L (ref 133–144)
SP GR UR STRIP: 1.01 (ref 1–1.03)
SQUAMOUS EPITHELIAL: <1 /HPF
SYSTOLIC BLOOD PRESSURE - MUSE: NORMAL MMHG
T AXIS - MUSE: 33 DEGREES
TROPONIN I SERPL HS-MCNC: 3 NG/L
UROBILINOGEN UR STRIP-MCNC: NORMAL MG/DL
VENTRICULAR RATE- MUSE: 84 BPM
WBC # BLD AUTO: 13.5 10E3/UL (ref 4–11)
WBC URINE: <1 /HPF

## 2022-08-28 PROCEDURE — 84484 ASSAY OF TROPONIN QUANT: CPT | Performed by: EMERGENCY MEDICINE

## 2022-08-28 PROCEDURE — 87637 SARSCOV2&INF A&B&RSV AMP PRB: CPT | Performed by: EMERGENCY MEDICINE

## 2022-08-28 PROCEDURE — 71045 X-RAY EXAM CHEST 1 VIEW: CPT

## 2022-08-28 PROCEDURE — 85007 BL SMEAR W/DIFF WBC COUNT: CPT | Performed by: EMERGENCY MEDICINE

## 2022-08-28 PROCEDURE — C9803 HOPD COVID-19 SPEC COLLECT: HCPCS

## 2022-08-28 PROCEDURE — 87040 BLOOD CULTURE FOR BACTERIA: CPT | Performed by: EMERGENCY MEDICINE

## 2022-08-28 PROCEDURE — 250N000011 HC RX IP 250 OP 636: Performed by: EMERGENCY MEDICINE

## 2022-08-28 PROCEDURE — 82803 BLOOD GASES ANY COMBINATION: CPT

## 2022-08-28 PROCEDURE — 81001 URINALYSIS AUTO W/SCOPE: CPT | Performed by: EMERGENCY MEDICINE

## 2022-08-28 PROCEDURE — 99285 EMERGENCY DEPT VISIT HI MDM: CPT | Mod: CS,25

## 2022-08-28 PROCEDURE — 83605 ASSAY OF LACTIC ACID: CPT

## 2022-08-28 PROCEDURE — 93005 ELECTROCARDIOGRAM TRACING: CPT

## 2022-08-28 PROCEDURE — 36415 COLL VENOUS BLD VENIPUNCTURE: CPT | Performed by: EMERGENCY MEDICINE

## 2022-08-28 PROCEDURE — 85027 COMPLETE CBC AUTOMATED: CPT | Performed by: EMERGENCY MEDICINE

## 2022-08-28 PROCEDURE — 80053 COMPREHEN METABOLIC PANEL: CPT | Performed by: EMERGENCY MEDICINE

## 2022-08-28 PROCEDURE — 87040 BLOOD CULTURE FOR BACTERIA: CPT | Mod: XU | Performed by: EMERGENCY MEDICINE

## 2022-08-28 RX ORDER — HEPARIN SODIUM (PORCINE) LOCK FLUSH IV SOLN 100 UNIT/ML 100 UNIT/ML
500 SOLUTION INTRAVENOUS ONCE
Status: COMPLETED | OUTPATIENT
Start: 2022-08-28 | End: 2022-08-28

## 2022-08-28 RX ADMIN — Medication 500 UNITS: at 17:07

## 2022-08-28 ASSESSMENT — ACTIVITIES OF DAILY LIVING (ADL)
ADLS_ACUITY_SCORE: 35
ADLS_ACUITY_SCORE: 35

## 2022-08-28 NOTE — TELEPHONE ENCOUNTER
Triage call  Patient calling to report she has a fever 100.4.  She has a dull pressure behind her eyes, she rates the pain  3-4/10 behind both eyes and forehead is a dull pain. She has a port .  Her  Temp 99.7 after taking tylenol 500 mg.  She has been taking tylenol  Up to 4000 units  In 24 hours. But is not getting any relief from that and is requesting something for pain.  Paged  on call for [facility] cancer clinic.  Dr MONA Hill called back and recommended she be seen it the ED and labs drawn because she had a chemo infusion on 8/25/2022 and her neutrophils have been trending down. She also is needing something more than tylenol for her pain.    Per Protocol  See HCP in 4 hours  ON call Doctor recommended ED called patient back and gave her the recommendations.  Care advice given.  Verbalizes understanding and agrees with plan.    Agnes Murillo RN   [facility] Nurse Advisor  11:48 AM 8/28/2022    COVID 19 Nurse Triage Plan/Patient Instructions    Please be aware that novel coronavirus (COVID-19) may be circulating in the community. If you develop symptoms such as fever, cough, or SOB or if you have concerns about the presence of another infection including coronavirus (COVID-19), please contact your health care provider or visit [email or web address].     Disposition/Instructions    ED Visit recommended. Follow protocol based instructions.     Bring Your Own Device:  Please also bring your smart device(s) (smart phones, tablets, laptops) and their charging cables for your personal use and to communicate with your care team during your visit.    Thank you for taking steps to prevent the spread of this virus.  o Limit your contact with others.  o Wear a simple mask to cover your cough.  o Wash your hands well and often.    Resources    [facility]: About COVID-19: [email or web address]    CDC: What to Do If You're Sick:  www.cdc.gov/coronavirus/2019-ncov/about/steps-when-sick.html    CDC: Ending Home Isolation: www.cdc.gov/coronavirus/2019-ncov/hcp/disposition-in-home-patients.html     CDC: Caring for Someone: www.cdc.gov/coronavirus/2019-ncov/if-you-are-sick/care-for-someone.html     [facility]: Interim Guidance for Hospital Discharge to Home: www.health.[facility].[place]./diseases/coronavirus/hcp/hospdischarge.pdf    [facility] clinical trials (COVID-19 research studies): clinicalaffairs.[facility]-clinical-trials     Below are the COVID-19 hotlines at the [place] Department of Health ([facility]). Interpreters are available.   o For health questions: Call 772-528-6806 or 1-488.940.5690 (7 a.m. to 7 p.m.)  o For questions about schools and childcare: Call 518-978-5784 or 1-394.438.8336 (7 a.m. to 7 p.m.)     Reason for Disposition    [1] Fever > 100.0 F (37.8 C) AND [2] port (portacath), central line, or PICC line    Additional Information    Negative: Shock suspected (e.g., cold/pale/clammy skin, too weak to stand, low BP, rapid pulse)    Negative: Difficult to awaken or acting confused (e.g., disoriented, slurred speech)    Negative: Bluish (or gray) lips or face now    Negative: New-onset rash with many purple (or blood-colored) spots or dots    Negative: Sounds like a life-threatening emergency to the triager    Negative: Fever > 103 F (39.4 C)    Negative: Other symptom is present, see that guideline (e.g., symptoms of cough, runny nose, sore throat, earache, abdominal pain, diarrhea, vomiting)    Negative: [1] Neutropenia known or suspected (e.g., recent cancer chemotherapy) AND [2] fever > 100.4 F (38.0 C)    Negative: [1] Neutropenia known or suspected (e.g., recent cancer chemotherapy) AND [2] signs or symptoms of suspected infection are present    Negative: [1] Headache AND [2] stiff neck (can't touch chin to chest)    Negative: Difficulty breathing    Negative: [1] Drinking very little AND [2] dehydration suspected (e.g., no  urine > 12 hours, very dry mouth, very lightheaded)    Negative: Patient sounds very sick or weak to the triager  (Exception: mild weakness and hasn't taken fever medicine)    Negative: [1] Fever > 100.0 F (37.8 C) AND [2] indwelling urinary catheter (e.g., coude, Patel)    Negative: [1] Fever > 100.0 F (37.8 C) AND [2] diabetes mellitus or weak immune system (e.g., HIV positive, cancer chemo, splenectomy, organ transplant, chronic steroids)    Negative: [1] Fever > 100.0 F (37.8 C) AND [2] bedridden (e.g., nursing home patient, CVA, chronic illness, recovering from surgery)    Negative: [1] Fever > 101 F (38.3 C) AND [2] co-morbidity risk factor for serious infection (e.g., COPD, heart failure, liver failure, renal failure with dialysis)    Negative: [1] Fever > 101 F (38.3 C) AND [2] age > 60 years    Protocols used: CANCER - FEVER-A-

## 2022-08-28 NOTE — ED PROVIDER NOTES
History     Chief Complaint:    Fever      HPI   Jessica Zamorano is a 71 year old female who presents with fever and headache.  She had her second dose of chemotherapy on Thursday.  She noted a headache shortly after chemotherapy behind the left eye.  She is going through chemotherapy for sebaceous cancer to the eyelid.  She then developed low-grade temperature the following day of 99.6.  Today it was 100.4.  She called the clinic and was told to come in.  She has had a cough but notes the cough has been fairly chronic and not any thing worse.  She denies any abdominal symptoms in terms of vomiting or diarrhea or abdominal pain.  Says the headache has waxed and waned and is currently better.  She has no neck stiffness.  She has a port in her right chest.    Review of Systems  Positive for low-grade fevers headache and chronic cough negative for abdominal pain urinary symptoms other than she has frequent urination that she feels is secondary to increased hydration negative for neck stiffness negative for rash all other systems negative    Allergies:      Hyaluronan  Prednisolone  Sodium Hyaluronate  Dexamethasone  Metronidazole  Povidone Iodine  Tobramycin      Medications:      acetaminophen (TYLENOL) 325 MG tablet  amLODIPine (NORVASC) 5 MG tablet  lidocaine-prilocaine (EMLA) 2.5-2.5 % external cream  LORazepam (ATIVAN) 0.5 MG tablet  melatonin 1 MG TABS tablet  multivitamin w/minerals (THERA-VIT-M) tablet  ondansetron (ZOFRAN) 8 MG tablet  prochlorperazine (COMPAZINE) 10 MG tablet        Past Medical History:        Past Medical History:   Diagnosis Date     Hypertension      Patient Active Problem List    Diagnosis Date Noted     Malignant neoplasm of head, face, and neck (H) 08/16/2022     Priority: Medium     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, including canthus 07/12/2022     Priority: Medium        Past Surgical History:        Past Surgical History:   Procedure Laterality Date     INSERT PORT  "VASCULAR ACCESS Right 8/12/2022    Procedure: INSERTION, VASCULAR ACCESS PORT SINGLE LUMEN;  Surgeon: Toñito Kolb MD;  Location: [facility] OR     IR CHEST PORT PLACEMENT > 5 YRS OF AGE  8/12/2022       Family History:        Family History   Problem Relation Age of Onset     Glaucoma No family hx of      Macular Degeneration No family hx of        Social History:    [employer] [place]  The patient presents to the ED with alone    Physical Exam     Patient Vitals for the past 24 hrs:   BP Temp Temp src Pulse Resp SpO2 Height Weight   08/28/22 1646 -- -- -- 81 15 -- -- --   08/28/22 1624 130/84 -- -- 87 -- -- -- --   08/28/22 1528 -- -- -- 85 14 100 % -- --   08/28/22 1526 135/68 -- -- 94 -- 98 % -- --   08/28/22 1357 (!) 140/58 98.6  F (37  C) Temporal 95 20 98 % 1.6 m (5' 3\") 62.6 kg (138 lb)       Physical Exam  GENERAL: well developed, pleasant  HEAD: atraumatic  EYES: Left eyelid and eye is held mostly closed no drainage no surrounding erythema or warmth  ENT:  mucus membranes moist  NECK:  trachea midline, normal range of motion  RESPIRATORY: no tachypnea, breath sounds clear to auscultation   CVS: normal S1/S2, no murmurs, intact distal pulses  ABDOMEN: soft, nontender, nondistention  MUSCULOSKELETAL: no deformities  SKIN: warm and dry, no acute rashes or ulceration  NEURO: GCS 15, cranial nerves intact, alert and oriented x3  PSYCH:  Mood/affect normal        Emergency Department Course       ECG results from 08/28/22   EKG 12-lead, tracing only     Value    Systolic Blood Pressure     Diastolic Blood Pressure     Ventricular Rate 84    Atrial Rate 84    VA Interval 172    QRS Duration 82        QTc 432    P Axis 60    R AXIS 38    T Axis 33    Interpretation ECG      Sinus rhythm  Normal ECG  No previous ECGs available  Confirmed by GENERATED REPORT, COMPUTER ([id number]),  Zainab Al ([id number]) on 8/28/2022 7:05:03 PM         Imaging:  XR Chest Port 1 View   Final Result   IMPRESSION: Lung volumes " are low. No focal airspace opacities, pleural effusions, or pneumothorax. Right chest port with tip in the mid SVC. Nonenlarged cardiac silhouette.        Report per radiology    Laboratory:  Labs Ordered and Resulted from Time of ED Arrival to Time of ED Departure   COMPREHENSIVE METABOLIC PANEL - Abnormal       Result Value    Sodium 135      Potassium 3.3 (*)     Chloride 106      Carbon Dioxide (CO2) 24      Anion Gap 5      Urea Nitrogen 15      Creatinine 0.83      Calcium 8.9      Glucose 98      Alkaline Phosphatase 78      AST 17      ALT 24      Protein Total 7.0      Albumin 3.4      Bilirubin Total 1.3      GFR Estimate 74     ROUTINE UA WITH MICROSCOPIC REFLEX TO CULTURE - Abnormal    Color Urine Light Yellow      Appearance Urine Clear      Glucose Urine Negative      Bilirubin Urine Negative      Ketones Urine Negative      Specific Gravity Urine 1.006      Blood Urine Moderate (*)     pH Urine 6.0      Protein Albumin Urine Negative      Urobilinogen Urine Normal      Nitrite Urine Negative      Leukocyte Esterase Urine Negative      RBC Urine 1      WBC Urine <1      Squamous Epithelials Urine <1     CBC WITH PLATELETS AND DIFFERENTIAL - Abnormal    WBC Count 13.5 (*)     RBC Count 3.91      Hemoglobin 12.3      Hematocrit 36.8      MCV 94      MCH 31.5      MCHC 33.4      RDW 11.8      Platelet Count 108 (*)    ISTAT GASES LACTATE VENOUS POCT - Abnormal    Lactic Acid POCT 0.6      Bicarbonate Venous POCT 25      O2 Sat, Venous POCT 54 (*)     pCO2V Venous POCT 40      pH Venous POCT 7.41      pO2 Venous POCT 28     TROPONIN I - Normal    Troponin I High Sensitivity 3     INFLUENZA A/B & SARS-COV2 PCR MULTIPLEX - Normal    Influenza A PCR Negative      Influenza B PCR Negative      RSV PCR Negative      SARS CoV2 PCR Negative     BLOOD CULTURE   BLOOD CULTURE       Procedures:  na    Emergency Department Course:             Reviewed:    I reviewed nursing notes, vitals and past medical  history    Assessments:  1520 I obtained history and examined the patient as noted above.    I rechecked the patient and explained findings.       Consults:   na         Interventions:    Medications   heparin 100 UNIT/ML injection 500 Units (500 Units Intracatheter Given 8/28/22 1707)       Disposition:  The patient was discharged to home.     Impression & Plan            CMS Diagnoses:        Medical Decision Making:  Patient is currently going through chemotherapy for eyelid cancer.  She recently had chemotherapy.  She notes a headache almost immediately during her chemotherapy behind the left eye.  She has had imaging of her brain within the last month or so with an MRI.  Blood cultures are obtained.  White count slightly elevated but she is not neutropenic.  Urine and chest x-ray are negative.  COVID is negative.  Discussed getting an MRI with her of her brain and she has declined.  Think would be low yield.  Possibly all related to recent chemotherapy.  She can follow-up with her oncology team or return if worse.    Critical Care time:  none    Covid-19  Jessica Zamorano was evaluated during a global COVID-19 pandemic, which necessitated consideration that the patient might be at risk for infection with the SARS-CoV-2 virus that causes COVID-19.   Applicable protocols for evaluation were followed during the patient's care.   COVID-19 was considered as part of the patient's evaluation.    Diagnosis:    ICD-10-CM    1. Fever and chills  R50.9    2. Nonintractable headache, unspecified chronicity pattern, unspecified headache type  R51.9        Discharge Medications:  Discharge Medication List as of 8/28/2022  5:04 PM            Scribe Disclosure:  Mario RAMOS MD, am serving as a scribe at 3:14 PM on 8/28/2022 to document services personally performed by Mario Albrecht MD based on my observations and the provider's statements to me.      Mario Albrecht MD  08/28/22 1675

## 2022-08-28 NOTE — ED TRIAGE NOTES
Pt. Complains of fever and headache. Pt. Had a port placed 2 weeks ago, and had 2nd treatment on Thursday. Pt. Has headache with eye pain that is not going away. Fever Friday and Saturday. ONC told pt to come in.     Triage Assessment     Row Name 08/28/22 1400       Triage Assessment (Adult)    Airway WDL WDL       Respiratory WDL    Respiratory WDL WDL       Skin Circulation/Temperature WDL    Skin Circulation/Temperature WDL WDL       Cardiac WDL    Cardiac WDL WDL       Peripheral/Neurovascular WDL    Peripheral Neurovascular WDL WDL       Cognitive/Neuro/Behavioral WDL    Cognitive/Neuro/Behavioral WDL WDL

## 2022-08-31 ENCOUNTER — PATIENT OUTREACH (OUTPATIENT)
Dept: ONCOLOGY | Facility: CLINIC | Age: 72
End: 2022-08-31

## 2022-08-31 NOTE — PROGRESS NOTES
"Received call from Jessica.  Reports a generalized rash on her body.  Looks like \"mosquito bites\" and itches.      Has been using her own cortisone cream 2% which helps with the itching.      She completed cycle 1 of chemo (gemcitabine only) last Friday.      Pictures of rash received and reviewed with Katy CALLE.  She suggested oral steroids since it is generalized.  Discussed Jessica's sensitivity to steroids.    Ok to continue using the cortisone cream and see how she is doing tomorrow.     Jessica agreed to plan.  She will call me with an update tomorrow.  Let her know, to call me right away in the morning if the rash gets worse.     Bridgette Brian, RNCC    "

## 2022-09-01 DIAGNOSIS — C44.131 SEBACEOUS CELL CARCINOMA OF SKIN OF EYELID, INCLUDING CANTHUS: Primary | ICD-10-CM

## 2022-09-01 DIAGNOSIS — I10 UNCONTROLLED HYPERTENSION: ICD-10-CM

## 2022-09-01 RX ORDER — METHYLPREDNISOLONE 4 MG
TABLET, DOSE PACK ORAL
Qty: 21 TABLET | Refills: 0 | Status: SHIPPED | OUTPATIENT
Start: 2022-09-01 | End: 2022-09-30

## 2022-09-02 LAB
BACTERIA BLD CULT: NO GROWTH
BACTERIA BLD CULT: NO GROWTH

## 2022-09-08 ENCOUNTER — PATIENT OUTREACH (OUTPATIENT)
Dept: ONCOLOGY | Facility: CLINIC | Age: 72
End: 2022-09-08

## 2022-09-08 DIAGNOSIS — I10 UNCONTROLLED HYPERTENSION: ICD-10-CM

## 2022-09-08 RX ORDER — AMLODIPINE BESYLATE 5 MG/1
5 TABLET ORAL DAILY
Qty: 30 TABLET | Refills: 0 | Status: SHIPPED | OUTPATIENT
Start: 2022-09-08 | End: 2022-09-30

## 2022-09-08 RX ORDER — AMLODIPINE BESYLATE 5 MG/1
5 TABLET ORAL DAILY
Qty: 30 TABLET | Refills: 0 | Status: CANCELLED | OUTPATIENT
Start: 2022-09-08

## 2022-09-09 ENCOUNTER — TELEPHONE (OUTPATIENT)
Dept: ONCOLOGY | Facility: CLINIC | Age: 72
End: 2022-09-09

## 2022-09-09 ENCOUNTER — APPOINTMENT (OUTPATIENT)
Dept: LAB | Facility: CLINIC | Age: 72
End: 2022-09-09
Attending: INTERNAL MEDICINE
Payer: COMMERCIAL

## 2022-09-09 ENCOUNTER — INFUSION THERAPY VISIT (OUTPATIENT)
Dept: ONCOLOGY | Facility: CLINIC | Age: 72
End: 2022-09-09
Attending: PHYSICIAN ASSISTANT
Payer: COMMERCIAL

## 2022-09-09 ENCOUNTER — ONCOLOGY VISIT (OUTPATIENT)
Dept: ONCOLOGY | Facility: CLINIC | Age: 72
End: 2022-09-09
Attending: INTERNAL MEDICINE
Payer: COMMERCIAL

## 2022-09-09 VITALS
TEMPERATURE: 98.7 F | OXYGEN SATURATION: 95 % | WEIGHT: 135.4 LBS | RESPIRATION RATE: 18 BRPM | SYSTOLIC BLOOD PRESSURE: 162 MMHG | HEART RATE: 95 BPM | DIASTOLIC BLOOD PRESSURE: 71 MMHG | BODY MASS INDEX: 23.99 KG/M2

## 2022-09-09 VITALS — DIASTOLIC BLOOD PRESSURE: 84 MMHG | SYSTOLIC BLOOD PRESSURE: 159 MMHG

## 2022-09-09 DIAGNOSIS — C76.0 MALIGNANT NEOPLASM OF HEAD, FACE, AND NECK (H): Primary | ICD-10-CM

## 2022-09-09 LAB
ALBUMIN SERPL BCG-MCNC: 4 G/DL (ref 3.5–5.2)
ALP SERPL-CCNC: 95 U/L (ref 35–104)
ALT SERPL W P-5'-P-CCNC: 31 U/L (ref 10–35)
ANION GAP SERPL CALCULATED.3IONS-SCNC: 13 MMOL/L (ref 7–15)
AST SERPL W P-5'-P-CCNC: 28 U/L (ref 10–35)
BASOPHILS # BLD MANUAL: 0.1 10E3/UL (ref 0–0.2)
BASOPHILS NFR BLD MANUAL: 1 %
BILIRUB SERPL-MCNC: 0.2 MG/DL
BUN SERPL-MCNC: 19.1 MG/DL (ref 8–23)
CALCIUM SERPL-MCNC: 9 MG/DL (ref 8.8–10.2)
CHLORIDE SERPL-SCNC: 104 MMOL/L (ref 98–107)
CREAT SERPL-MCNC: 0.85 MG/DL (ref 0.51–0.95)
DEPRECATED HCO3 PLAS-SCNC: 23 MMOL/L (ref 22–29)
EOSINOPHIL # BLD MANUAL: 0.1 10E3/UL (ref 0–0.7)
EOSINOPHIL NFR BLD MANUAL: 1 %
ERYTHROCYTE [DISTWIDTH] IN BLOOD BY AUTOMATED COUNT: 13.2 % (ref 10–15)
GFR SERPL CREATININE-BSD FRML MDRD: 72 ML/MIN/1.73M2
GLUCOSE SERPL-MCNC: 91 MG/DL (ref 70–99)
HCT VFR BLD AUTO: 40 % (ref 35–47)
HGB BLD-MCNC: 13.4 G/DL (ref 11.7–15.7)
LYMPHOCYTES # BLD MANUAL: 2.7 10E3/UL (ref 0.8–5.3)
LYMPHOCYTES NFR BLD MANUAL: 43 %
MCH RBC QN AUTO: 31.6 PG (ref 26.5–33)
MCHC RBC AUTO-ENTMCNC: 33.5 G/DL (ref 31.5–36.5)
MCV RBC AUTO: 94 FL (ref 78–100)
METAMYELOCYTES # BLD MANUAL: 0.1 10E3/UL
METAMYELOCYTES NFR BLD MANUAL: 1 %
MONOCYTES # BLD MANUAL: 1.4 10E3/UL (ref 0–1.3)
MONOCYTES NFR BLD MANUAL: 23 %
MYELOCYTES # BLD MANUAL: 0.1 10E3/UL
MYELOCYTES NFR BLD MANUAL: 2 %
NEUTROPHILS # BLD MANUAL: 1.8 10E3/UL (ref 1.6–8.3)
NEUTROPHILS NFR BLD MANUAL: 29 %
PLAT MORPH BLD: ABNORMAL
PLATELET # BLD AUTO: 490 10E3/UL (ref 150–450)
POTASSIUM SERPL-SCNC: 4 MMOL/L (ref 3.4–5.3)
PROT SERPL-MCNC: 6.5 G/DL (ref 6.4–8.3)
RBC # BLD AUTO: 4.24 10E6/UL (ref 3.8–5.2)
RBC MORPH BLD: ABNORMAL
SODIUM SERPL-SCNC: 140 MMOL/L (ref 136–145)
WBC # BLD AUTO: 6.3 10E3/UL (ref 4–11)

## 2022-09-09 PROCEDURE — 250N000011 HC RX IP 250 OP 636: Performed by: PHYSICIAN ASSISTANT

## 2022-09-09 PROCEDURE — 85027 COMPLETE CBC AUTOMATED: CPT | Performed by: PHYSICIAN ASSISTANT

## 2022-09-09 PROCEDURE — 258N000003 HC RX IP 258 OP 636: Performed by: STUDENT IN AN ORGANIZED HEALTH CARE EDUCATION/TRAINING PROGRAM

## 2022-09-09 PROCEDURE — 96367 TX/PROPH/DG ADDL SEQ IV INF: CPT

## 2022-09-09 PROCEDURE — 36591 DRAW BLOOD OFF VENOUS DEVICE: CPT | Performed by: PHYSICIAN ASSISTANT

## 2022-09-09 PROCEDURE — G0463 HOSPITAL OUTPT CLINIC VISIT: HCPCS

## 2022-09-09 PROCEDURE — 96417 CHEMO IV INFUS EACH ADDL SEQ: CPT

## 2022-09-09 PROCEDURE — 80053 COMPREHEN METABOLIC PANEL: CPT | Performed by: PHYSICIAN ASSISTANT

## 2022-09-09 PROCEDURE — 99207 PR NO BILLABLE SERVICE THIS VISIT: CPT

## 2022-09-09 PROCEDURE — 258N000003 HC RX IP 258 OP 636: Performed by: PHYSICIAN ASSISTANT

## 2022-09-09 PROCEDURE — 96413 CHEMO IV INFUSION 1 HR: CPT

## 2022-09-09 PROCEDURE — 99214 OFFICE O/P EST MOD 30 MIN: CPT | Performed by: PHYSICIAN ASSISTANT

## 2022-09-09 PROCEDURE — 96375 TX/PRO/DX INJ NEW DRUG ADDON: CPT

## 2022-09-09 PROCEDURE — 250N000011 HC RX IP 250 OP 636: Performed by: STUDENT IN AN ORGANIZED HEALTH CARE EDUCATION/TRAINING PROGRAM

## 2022-09-09 PROCEDURE — 85007 BL SMEAR W/DIFF WBC COUNT: CPT | Performed by: PHYSICIAN ASSISTANT

## 2022-09-09 RX ORDER — HEPARIN SODIUM (PORCINE) LOCK FLUSH IV SOLN 100 UNIT/ML 100 UNIT/ML
5 SOLUTION INTRAVENOUS
Status: CANCELLED | OUTPATIENT
Start: 2022-09-16

## 2022-09-09 RX ORDER — HEPARIN SODIUM,PORCINE 10 UNIT/ML
5 VIAL (ML) INTRAVENOUS
Status: CANCELLED | OUTPATIENT
Start: 2022-09-16

## 2022-09-09 RX ORDER — LORAZEPAM 2 MG/ML
0.5 INJECTION INTRAMUSCULAR EVERY 4 HOURS PRN
Status: CANCELLED | OUTPATIENT
Start: 2022-09-09

## 2022-09-09 RX ORDER — DIPHENHYDRAMINE HYDROCHLORIDE 50 MG/ML
50 INJECTION INTRAMUSCULAR; INTRAVENOUS
Status: CANCELLED
Start: 2022-09-16

## 2022-09-09 RX ORDER — HEPARIN SODIUM (PORCINE) LOCK FLUSH IV SOLN 100 UNIT/ML 100 UNIT/ML
5 SOLUTION INTRAVENOUS
Status: CANCELLED | OUTPATIENT
Start: 2022-09-09

## 2022-09-09 RX ORDER — DIPHENHYDRAMINE HYDROCHLORIDE 50 MG/ML
50 INJECTION INTRAMUSCULAR; INTRAVENOUS
Status: CANCELLED
Start: 2022-09-09

## 2022-09-09 RX ORDER — PALONOSETRON 0.05 MG/ML
0.25 INJECTION, SOLUTION INTRAVENOUS ONCE
Status: COMPLETED | OUTPATIENT
Start: 2022-09-09 | End: 2022-09-09

## 2022-09-09 RX ORDER — ALBUTEROL SULFATE 90 UG/1
1-2 AEROSOL, METERED RESPIRATORY (INHALATION)
Status: CANCELLED
Start: 2022-09-09

## 2022-09-09 RX ORDER — MEPERIDINE HYDROCHLORIDE 25 MG/ML
25 INJECTION INTRAMUSCULAR; INTRAVENOUS; SUBCUTANEOUS EVERY 30 MIN PRN
Status: CANCELLED | OUTPATIENT
Start: 2022-09-09

## 2022-09-09 RX ORDER — PALONOSETRON 0.05 MG/ML
0.25 INJECTION, SOLUTION INTRAVENOUS ONCE
Status: CANCELLED
Start: 2022-09-09 | End: 2022-09-09

## 2022-09-09 RX ORDER — LORAZEPAM 2 MG/ML
0.5 INJECTION INTRAMUSCULAR EVERY 4 HOURS PRN
Status: CANCELLED | OUTPATIENT
Start: 2022-09-16

## 2022-09-09 RX ORDER — EPINEPHRINE 1 MG/ML
0.3 INJECTION, SOLUTION INTRAMUSCULAR; SUBCUTANEOUS EVERY 5 MIN PRN
Status: CANCELLED | OUTPATIENT
Start: 2022-09-16

## 2022-09-09 RX ORDER — HEPARIN SODIUM (PORCINE) LOCK FLUSH IV SOLN 100 UNIT/ML 100 UNIT/ML
5 SOLUTION INTRAVENOUS
Status: DISCONTINUED | OUTPATIENT
Start: 2022-09-09 | End: 2022-09-09 | Stop reason: HOSPADM

## 2022-09-09 RX ORDER — MEPERIDINE HYDROCHLORIDE 25 MG/ML
25 INJECTION INTRAMUSCULAR; INTRAVENOUS; SUBCUTANEOUS EVERY 30 MIN PRN
Status: CANCELLED | OUTPATIENT
Start: 2022-09-16

## 2022-09-09 RX ORDER — ALBUTEROL SULFATE 0.83 MG/ML
2.5 SOLUTION RESPIRATORY (INHALATION)
Status: CANCELLED | OUTPATIENT
Start: 2022-09-09

## 2022-09-09 RX ORDER — ALBUTEROL SULFATE 0.83 MG/ML
2.5 SOLUTION RESPIRATORY (INHALATION)
Status: CANCELLED | OUTPATIENT
Start: 2022-09-16

## 2022-09-09 RX ORDER — METHYLPREDNISOLONE SODIUM SUCCINATE 125 MG/2ML
125 INJECTION, POWDER, LYOPHILIZED, FOR SOLUTION INTRAMUSCULAR; INTRAVENOUS
Status: CANCELLED
Start: 2022-09-09

## 2022-09-09 RX ORDER — METHYLPREDNISOLONE SODIUM SUCCINATE 125 MG/2ML
125 INJECTION, POWDER, LYOPHILIZED, FOR SOLUTION INTRAMUSCULAR; INTRAVENOUS
Status: CANCELLED
Start: 2022-09-16

## 2022-09-09 RX ORDER — DEXAMETHASONE 4 MG/1
4 TABLET ORAL 2 TIMES DAILY WITH MEALS
Qty: 6 TABLET | Refills: 0 | Status: SHIPPED | OUTPATIENT
Start: 2022-09-09 | End: 2022-09-15

## 2022-09-09 RX ORDER — HEPARIN SODIUM (PORCINE) LOCK FLUSH IV SOLN 100 UNIT/ML 100 UNIT/ML
5 SOLUTION INTRAVENOUS EVERY 8 HOURS
Status: DISCONTINUED | OUTPATIENT
Start: 2022-09-09 | End: 2022-09-13 | Stop reason: HOSPADM

## 2022-09-09 RX ORDER — HEPARIN SODIUM,PORCINE 10 UNIT/ML
5 VIAL (ML) INTRAVENOUS
Status: CANCELLED | OUTPATIENT
Start: 2022-09-09

## 2022-09-09 RX ORDER — EPINEPHRINE 1 MG/ML
0.3 INJECTION, SOLUTION INTRAMUSCULAR; SUBCUTANEOUS EVERY 5 MIN PRN
Status: CANCELLED | OUTPATIENT
Start: 2022-09-09

## 2022-09-09 RX ORDER — ALBUTEROL SULFATE 90 UG/1
1-2 AEROSOL, METERED RESPIRATORY (INHALATION)
Status: CANCELLED
Start: 2022-09-16

## 2022-09-09 RX ADMIN — SODIUM CHLORIDE 250 ML: 9 INJECTION, SOLUTION INTRAVENOUS at 11:22

## 2022-09-09 RX ADMIN — PALONOSETRON HYDROCHLORIDE 0.25 MG: 0.25 INJECTION INTRAVENOUS at 11:23

## 2022-09-09 RX ADMIN — Medication 5 ML: at 10:06

## 2022-09-09 RX ADMIN — GEMCITABINE 1600 MG: 38 INJECTION, SOLUTION INTRAVENOUS at 12:13

## 2022-09-09 RX ADMIN — CARBOPLATIN 350 MG: 10 INJECTION, SOLUTION INTRAVENOUS at 13:12

## 2022-09-09 RX ADMIN — Medication 5 ML: at 13:45

## 2022-09-09 RX ADMIN — DEXAMETHASONE SODIUM PHOSPHATE: 10 INJECTION, SOLUTION INTRAMUSCULAR; INTRAVENOUS at 11:44

## 2022-09-09 ASSESSMENT — PAIN SCALES - GENERAL: PAINLEVEL: NO PAIN (0)

## 2022-09-09 NOTE — TELEPHONE ENCOUNTER
Prior Authorization Retail Medication Request    Medication/Dose: LORAZEPAM 0.5MG TABLET  ICD code (if different than what is on RX):    Previously Tried and Failed:    Rationale:      Insurance Name:  [facility]  Insurance ID:  [id number]      Pharmacy Information (if different than what is on RX)  Name:  [facility]  Phone:  [phone]

## 2022-09-09 NOTE — TELEPHONE ENCOUNTER
Central Prior Authorization Team   Phone: [phone]      PA Initiation    Medication: LORAZEPAM 0.5MG TABLET-PA initiated  Insurance Company: [employer] - Phone [phone] Fax [phone]  Pharmacy Filling the Rx: [facility] [address] [place] [address].  Filling Pharmacy Phone: [phone]  Filling Pharmacy Fax:    Start Date: 9/9/2022

## 2022-09-09 NOTE — NURSING NOTE
"Oncology Rooming Note    September 9, 2022 10:13 AM   Jessica Zamorano is a 71 year old female who presents for:    Chief Complaint   Patient presents with     Port Draw     Power needle, heparin locked,vitals checked     Oncology Clinic Visit     Malignant neoplasm of head, face, and neck (H)     Initial Vitals: BP (!) 162/71   Pulse 95   Temp 98.7  F (37.1  C)   Resp 18   Wt 61.4 kg (135 lb 6.4 oz)   SpO2 95%   BMI 23.99 kg/m   Estimated body mass index is 23.99 kg/m  as calculated from the following:    Height as of 8/28/22: 1.6 m (5' 3\").    Weight as of this encounter: 61.4 kg (135 lb 6.4 oz). Body surface area is 1.65 meters squared.  No Pain (0) Comment: Data Unavailable   No LMP recorded. Patient is postmenopausal.  Allergies reviewed: Yes  Medications reviewed: Yes    Medications: Medication refills not needed today.  Pharmacy name entered into [facility]: [facility] [address] [place] [address] - [address] [place] [address].    Clinical concerns: Patient reports headache that causes pressure at the forehead and behind the eyes, side effects from last chemo- please discuss. Reports having a fever over the weekend.       Carrie Arellano LPN September 9, 2022 10:16 AM            "

## 2022-09-09 NOTE — LETTER
9/9/2022         RE: Jessica Zamorano  [address]  [facility] [address]      Oncology Follow-up Visit  Sep 9, 2022    Reason for Visit: follow up of sebaceous cell carcinoma of eyelid    Oncology History: Jessica Zamorano is a 71 year old female  with past medical history of sebaceous cell carcinoma of the eyelid. She was evaluated initially in 2021. A biopsy was performed consistent with sebaceous cell carcinoma. She was evaluated at [facility] in 2021 where she underwent mapping biopsies of the tumor with plans for surgery. 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 Imaging with PET scan and orbital exenteration was recommended. She declined imaging or surgery and was lost to follow-up following the biopsy.     She was seen by Dr. Orozco 3/22/22 for evaluation with concern for lesion involving the lid, conjunctiva and cornea. Biopsy confirmed sebaceous cell carcinoma. Due to the extent of disease orbit sparing would not be possible.     PET/CT performed 3/31/22 showed only local disease with deep breast uptake. Mammogram and subsequent biopsy  7/19/22 negative for atypica or malignancy and positive for fibroadenoma.     She met with Dr. Dobson 7/12/22 to discuss potential treatment options. She again declined curative surgery therefore palliative chemotherapy with carboplatin/gemcitabine was recommended.    She presents today for evaluation prior to C2D1 gemcitabine with the addition of carboplatin today.    Interval History:  Patient reports that after her first dose of gemcitabine, she developed a headache for 1 day that improved with Tylenol.  With the second dose, she developed a rash and headache that lasted for 4 days.  The headache did not improve with Tylenol.  The rash started on her right neck and then spread to her arms and trunk.  She did not find any relief from topical steroids.  The rash did improve after a couple of days on oral steroids.  She does  have a mildly itchy back now, but denies any current rashes.  She did note some difficulty sleeping while on the steroids and this improved with taking Ativan.  She reports having constipation managed with MiraLAX and sometimes Dulcolax.  She denies any mouth sores.  She did have mild nausea and did not feel the need to take any antiemetics.  She reports using Vaseline gel for a cold sore.  She has had some increased fatigue and is taking a 1 to 2-hour nap per day.  She also notes that she had some pain in her left great toe base that is now improving.  She is unsure of the cause of this.  She feels her eyelid is doing a little bit better with less dryness.  She does still have some irritation at the left medial corner.  She is going to be getting set up with [facility] soon.  She denies other concerns.    Current Outpatient Medications   Medication     acetaminophen (TYLENOL) 325 MG tablet     amLODIPine (NORVASC) 5 MG tablet     lidocaine-prilocaine (EMLA) 2.5-2.5 % external cream     LORazepam (ATIVAN) 0.5 MG tablet     melatonin 1 MG TABS tablet     methylPREDNISolone (MEDROL DOSEPAK) 4 MG tablet therapy pack     multivitamin w/minerals (THERA-VIT-M) tablet     ondansetron (ZOFRAN) 8 MG tablet     prochlorperazine (COMPAZINE) 10 MG tablet     dexamethasone (DECADRON) 4 MG tablet     Current Facility-Administered Medications   Medication     heparin 100 UNIT/ML injection 5 mL     Physical Exam:  General: The patient is a pleasant female in no acute distress.  BP (!) 162/71   Pulse 95   Temp 98.7  F (37.1  C)   Resp 18   Wt 61.4 kg (135 lb 6.4 oz)   SpO2 95%   BMI 23.99 kg/m    Wt Readings from Last 10 Encounters:   09/09/22 61.4 kg (135 lb 6.4 oz)   08/28/22 62.6 kg (138 lb)   08/25/22 62.1 kg (136 lb 12.8 oz)   08/19/22 63.2 kg (139 lb 6.4 oz)   08/12/22 63.5 kg (140 lb)   07/12/22 63.5 kg (140 lb)   04/01/22 61.2 kg (135 lb)   03/22/22 61.2 kg (135 lb)   HEENT: Ptsosis of left upper eyelid, some  scleral injection. EOMI. Sclerae are anicteric.   Lymph: Neck is supple with no lymphadenopathy in the cervical or supraclavicular areas.   Heart: Regular rate and rhythm.   Lungs: Clear to auscultation bilaterally.   Abdomen: Bowel sounds present, soft, nontender with no palpable hepatosplenomegaly or masses.   Extremities: No lower extremity edema noted bilaterally.   Neuro: No focal deficits.  Skin: No rashes, petechiae, or bruising noted on exposed skin. Skin on low back is mildly dry, no rashes noted.     Labs:   Most Recent 3 CBC's:  Recent Labs   Lab Test 09/09/22  1006 08/28/22  1521 08/25/22  1337 08/19/22  1028 07/12/22  1542   WBC 6.3 13.5* 4.6 7.8 7.3   HGB 13.4 12.3 13.5 14.5 14.9   MCV 94 94 93 93 94   * 108* 182 216 221   ANEUTAUTO  --   --  2.8 4.8 5.3    Most Recent 3 BMP's:  Recent Labs   Lab Test 09/09/22  1006 08/28/22  1521 08/19/22  1028    135 144   POTASSIUM 4.0 3.3* 3.7   CHLORIDE 104 106 109   CO2 23 24 26   BUN 19.1 15 22   CR 0.85 0.83 0.85   ANIONGAP 13 5 9   MARAL 9.0 8.9 8.8   GLC 91 98 82   PROTTOTAL 6.5 7.0 7.4   ALBUMIN 4.0 3.4 4.0    Most Recent 2 LFT's:  Recent Labs   Lab Test 09/09/22  1006 08/28/22  1521   AST 28 17   ALT 31 24   ALKPHOS 95 78   BILITOTAL 0.2 1.3   I reviewed the above labs today.    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 She also prefers to avoid alopecia and so is being treated with carboplatin/gemcitabine. Began cycle 1 8/19/22 with gemcitabine alone (carboplatin omitted due to need to secure financial coverage). She had some difficulty with headaches and a rash with plan for management outlined below. Carboplatin will be added to gemcitabine today with cycle 2. She will have close follow-up next week prior to consideration of cycle 2 day 8.     #Constipation  Continue MiraLAX or dulcolax PRN    #Nausea  Mild with gemzar alone. May  require antiemetics with addition of carbo which she has at home and was reviewed today. Discussed using Compazine as her first choice.     #Hypertension   -On amlodipine, tolerating well. Encouraged follow-up with her PCP to assist with elevated BP.    #Fibroadenoma on recent diagnostic mammogram  -Incidental finding on PET scan  -Diagnostic mammogram showed:  A. RIGHT BREAST, 8:00 POSITION, 3 CM FROM NIPPLE, BIOPSY:  -Fibroadenoma, hyalinized intracanalicular pattern.  -Negative for atypia or malignancy.   Will need annual mammograms    #Rash  Secondary to Gemzar. Now resolved. Will receive 12 mg dexamethasone IV with her infusions. For this week, she will also take 4 mg dexamethasone bid for 3 days following her infusion. We may need to add this next week as well.       35 minutes spent on the date of the encounter doing chart review, review of test results, interpretation of tests, patient visit and documentation       Katy Obregon PA-C

## 2022-09-09 NOTE — NURSING NOTE
Chief Complaint   Patient presents with     Port Draw     Power needle, heparin locked,vitals checked     Yadi Ferguson RN on 9/9/2022 at 9:57 AM

## 2022-09-09 NOTE — PROGRESS NOTES
Oncology Follow-up Visit  Sep 9, 2022    Reason for Visit: follow up of sebaceous cell carcinoma of eyelid    Oncology History: Jessica Zamorano is a 71 year old female  with past medical history of sebaceous cell carcinoma of the eyelid. She was evaluated initially in 2021. A biopsy was performed consistent with sebaceous cell carcinoma. She was evaluated at [facility] in 2021 where she underwent mapping biopsies of the tumor with plans for surgery. 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 Imaging with PET scan and orbital exenteration was recommended. She declined imaging or surgery and was lost to follow-up following the biopsy.     She was seen by Dr. Orozco 3/22/22 for evaluation with concern for lesion involving the lid, conjunctiva and cornea. Biopsy confirmed sebaceous cell carcinoma. Due to the extent of disease orbit sparing would not be possible.     PET/CT performed 3/31/22 showed only local disease with deep breast uptake. Mammogram and subsequent biopsy  7/19/22 negative for atypica or malignancy and positive for fibroadenoma.     She met with Dr. Dobson 7/12/22 to discuss potential treatment options. She again declined curative surgery therefore palliative chemotherapy with carboplatin/gemcitabine was recommended.    She presents today for evaluation prior to C2D1 gemcitabine with the addition of carboplatin today.    Interval History:  Patient reports that after her first dose of gemcitabine, she developed a headache for 1 day that improved with Tylenol.  With the second dose, she developed a rash and headache that lasted for 4 days.  The headache did not improve with Tylenol.  The rash started on her right neck and then spread to her arms and trunk.  She did not find any relief from topical steroids.  The rash did improve after a couple of days on oral steroids.  She does have a mildly itchy back now, but denies any current rashes.  She did note some difficulty  sleeping while on the steroids and this improved with taking Ativan.  She reports having constipation managed with MiraLAX and sometimes Dulcolax.  She denies any mouth sores.  She did have mild nausea and did not feel the need to take any antiemetics.  She reports using Vaseline gel for a cold sore.  She has had some increased fatigue and is taking a 1 to 2-hour nap per day.  She also notes that she had some pain in her left great toe base that is now improving.  She is unsure of the cause of this.  She feels her eyelid is doing a little bit better with less dryness.  She does still have some irritation at the left medial corner.  She is going to be getting set up with [facility] soon.  She denies other concerns.    Current Outpatient Medications   Medication     acetaminophen (TYLENOL) 325 MG tablet     amLODIPine (NORVASC) 5 MG tablet     lidocaine-prilocaine (EMLA) 2.5-2.5 % external cream     LORazepam (ATIVAN) 0.5 MG tablet     melatonin 1 MG TABS tablet     methylPREDNISolone (MEDROL DOSEPAK) 4 MG tablet therapy pack     multivitamin w/minerals (THERA-VIT-M) tablet     ondansetron (ZOFRAN) 8 MG tablet     prochlorperazine (COMPAZINE) 10 MG tablet     dexamethasone (DECADRON) 4 MG tablet     Current Facility-Administered Medications   Medication     heparin 100 UNIT/ML injection 5 mL     Physical Exam:  General: The patient is a pleasant female in no acute distress.  BP (!) 162/71   Pulse 95   Temp 98.7  F (37.1  C)   Resp 18   Wt 61.4 kg (135 lb 6.4 oz)   SpO2 95%   BMI 23.99 kg/m    Wt Readings from Last 10 Encounters:   09/09/22 61.4 kg (135 lb 6.4 oz)   08/28/22 62.6 kg (138 lb)   08/25/22 62.1 kg (136 lb 12.8 oz)   08/19/22 63.2 kg (139 lb 6.4 oz)   08/12/22 63.5 kg (140 lb)   07/12/22 63.5 kg (140 lb)   04/01/22 61.2 kg (135 lb)   03/22/22 61.2 kg (135 lb)   HEENT: Ptsosis of left upper eyelid, some scleral injection. EOMI. Sclerae are anicteric.   Lymph: Neck is supple with no lymphadenopathy  in the cervical or supraclavicular areas.   Heart: Regular rate and rhythm.   Lungs: Clear to auscultation bilaterally.   Abdomen: Bowel sounds present, soft, nontender with no palpable hepatosplenomegaly or masses.   Extremities: No lower extremity edema noted bilaterally.   Neuro: No focal deficits.  Skin: No rashes, petechiae, or bruising noted on exposed skin. Skin on low back is mildly dry, no rashes noted.     Labs:   Most Recent 3 CBC's:  Recent Labs   Lab Test 09/09/22  1006 08/28/22  1521 08/25/22  1337 08/19/22  1028 07/12/22  1542   WBC 6.3 13.5* 4.6 7.8 7.3   HGB 13.4 12.3 13.5 14.5 14.9   MCV 94 94 93 93 94   * 108* 182 216 221   ANEUTAUTO  --   --  2.8 4.8 5.3    Most Recent 3 BMP's:  Recent Labs   Lab Test 09/09/22  1006 08/28/22  1521 08/19/22  1028    135 144   POTASSIUM 4.0 3.3* 3.7   CHLORIDE 104 106 109   CO2 23 24 26   BUN 19.1 15 22   CR 0.85 0.83 0.85   ANIONGAP 13 5 9   MARAL 9.0 8.9 8.8   GLC 91 98 82   PROTTOTAL 6.5 7.0 7.4   ALBUMIN 4.0 3.4 4.0    Most Recent 2 LFT's:  Recent Labs   Lab Test 09/09/22  1006 08/28/22  1521   AST 28 17   ALT 31 24   ALKPHOS 95 78   BILITOTAL 0.2 1.3   I reviewed the above labs today.    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 She also prefers to avoid alopecia and so is being treated with carboplatin/gemcitabine. Began cycle 1 8/19/22 with gemcitabine alone (carboplatin omitted due to need to secure financial coverage). She had some difficulty with headaches and a rash with plan for management outlined below. Carboplatin will be added to gemcitabine today with cycle 2. She will have close follow-up next week prior to consideration of cycle 2 day 8.     #Constipation  Continue MiraLAX or dulcolax PRN    #Nausea  Mild with gemzar alone. May require antiemetics with addition of carbo which she has at home and was reviewed today.  Discussed using Compazine as her first choice.     #Hypertension   -On amlodipine, tolerating well. Encouraged follow-up with her PCP to assist with elevated BP.    #Fibroadenoma on recent diagnostic mammogram  -Incidental finding on PET scan  -Diagnostic mammogram showed:  A. RIGHT BREAST, 8:00 POSITION, 3 CM FROM NIPPLE, BIOPSY:  -Fibroadenoma, hyalinized intracanalicular pattern.  -Negative for atypia or malignancy.   Will need annual mammograms    #Rash  Secondary to Gemzar. Now resolved. Will receive 12 mg dexamethasone IV with her infusions. For this week, she will also take 4 mg dexamethasone bid for 3 days following her infusion. We may need to add this next week as well.     Katy Obregon PA-C  [facility]  [address], [place] [address] [phone]    35 minutes spent on the date of the encounter doing chart review, review of test results, interpretation of tests, patient visit and documentation

## 2022-09-09 NOTE — PROGRESS NOTES
Infusion Nursing Note:  Jessica Zamorano presents today for C2D1 Gemzar/Carboplatin.    Patient seen by provider today: Yes: Katy SPARKS   present during visit today: Not Applicable.    Note: Instruction given to patient as per provider. Verbalized understanding.    Intravenous Access:  Implanted Port.    Treatment Conditions:  Lab Results   Component Value Date    HGB 13.4 09/09/2022    WBC 13.5 (H) 08/28/2022    ANEU 12.4 (H) 08/28/2022    ANEUTAUTO 2.8 08/25/2022     (H) 09/09/2022      Lab Results   Component Value Date     09/09/2022    POTASSIUM 4.0 09/09/2022    CR 0.85 09/09/2022    MARAL 9.0 09/09/2022    BILITOTAL 0.2 09/09/2022    ALBUMIN 4.0 09/09/2022    ALT 31 09/09/2022    AST 28 09/09/2022     Results reviewed, labs MET treatment parameters, ok to proceed with treatment.    TORB: 9/9/22/Katy SPARKS/Larisa River RN/ Will add Dexamethasone 12 mg IV and 4 mg bid x 3 days at home despite of allergy. Will give her Carboplatin in addition.     Post Infusion Assessment:  Patient tolerated infusion without incident.  Blood return noted pre and post infusion.  Site patent and intact, free from redness, edema or discomfort.  No evidence of extravasations.  Access discontinued per protocol.     Discharge Plan:   Patient declined prescription refills.  Discharge instructions reviewed with: Patient.  Patient and/or family verbalized understanding of discharge instructions and all questions answered.  Copy of AVS reviewed with patient and/or family.  Patient will return 9/15/22 for next appointment.  Patient discharged in stable condition accompanied by: self.  Departure Mode: Ambulatory.      MAURICE DIAZ, ANDRIY

## 2022-09-09 NOTE — PATIENT INSTRUCTIONS
Contact Numbers  [facility]: [phone]    After Hours:  [phone]  Triage: [phone]    Please call the [facility] Triage line if you experience a temperature greater than or equal to 100.5, shaking chills, have uncontrolled nausea, vomiting and/or diarrhea, dizziness, shortness of breath, chest pain, bleeding, unexplained bruising, or if you have any other new/concerning symptoms, questions or concerns.     If it is after hours, weekends, or holidays, please call the main hospital  at  [phone] and ask to speak to the Oncology doctor on call.     If you are having any concerning symptoms or wish to speak to a provider before your next infusion visit, please call your care coordinator or triage to notify them so we can adequately serve you.     If you need a refill on a narcotic prescription or other medication, please call triage before your infusion appointment.       September 2022 Sunday Monday Tuesday Wednesday Thursday Friday Saturday                       1     2     3       4     5     6     7     8     9    LAB CENTRAL   9:45 AM   (15 min.)   [facility] DRAW   [facility]    RETURN  10:00 AM   (45 min.)   Katy Obregon PA-C   [facility]    ONC INFUSION 2 HR (120 MIN)  11:00 AM   (120 min.)    ONC INFUSION NURSE   [facility] 10       11     12     13     14     15    LAB CENTRAL  12:45 PM   (15 min.)   [facility]   [facility]    RETURN   1:00 PM   (45 min.)   Katy Obregon PA-C   [facility]    ONC INFUSION 1 HR (60 MIN)   2:00 PM   (60 min.)    ONC INFUSION NURSE   [facility] 16     17       18     19     20     21     22     23     24       25     26     27     28     29    LAB CENTRAL  11:45 AM   (15 min.)   [facility] DRAW   [facility]    RETURN  12:00 PM   (45 min.)   Katy Obregon PA-C   [facility]    ONC INFUSION 2 HR (120 MIN)   1:00 PM   (120 min.)    ONC INFUSION NURSE   [facility] 30 October 2022 Sunday Monday Tuesday Wednesday Thursday Friday Saturday                                 1       2     3     4     5     6    LAB CENTRAL  12:00 PM   (15 min.)   [facility] DRAW   [facility]    RETURN  12:15 PM   (45 min.)   Katy Ibanez CNP   [facility]    ONC INFUSION 1 HR (60 MIN)   1:30 PM   (60 min.)    ONC INFUSION NURSE   [facility] 7     8       9     10     11     12     13     14     15       16     17     18     19     20     21     22       23     24     25     26     27     28     29       30     31                                                Lab Results:  Recent Results (from the past 12 hour(s))   Comprehensive metabolic panel (BMP + Alb, Alk Phos, ALT, AST, Total. Bili, TP)    Collection Time: 09/09/22 10:06 AM   Result Value Ref Range    Sodium 140 136 - 145 mmol/L    Potassium 4.0 3.4 - 5.3 mmol/L    Creatinine 0.85 0.51 - 0.95 mg/dL    Urea Nitrogen 19.1 8.0 - 23.0 mg/dL    Chloride 104 98 - 107 mmol/L    Carbon Dioxide (CO2) 23 22 - 29 mmol/L    Anion Gap 13 7 - 15 mmol/L    Glucose 91 70 - 99 mg/dL    Calcium 9.0 8.8 - 10.2 mg/dL    Protein Total 6.5 6.4 - 8.3 g/dL    Albumin 4.0 3.5 - 5.2 g/dL    Bilirubin Total 0.2 <=1.2 mg/dL    Alkaline Phosphatase 95 35 - 104 U/L    AST 28 10 - 35 U/L    ALT 31 10 - 35 U/L    GFR Estimate 72 >60 mL/min/1.73m2   CBC with platelets and differential    Collection Time: 09/09/22 10:06 AM   Result Value Ref Range    WBC Count 6.3 4.0 - 11.0 10e3/uL    RBC Count 4.24 3.80 - 5.20 10e6/uL    Hemoglobin 13.4 11.7 - 15.7 g/dL    Hematocrit 40.0 35.0 - 47.0 %    MCV 94 78 - 100 fL    MCH 31.6 26.5 - 33.0  pg    MCHC 33.5 31.5 - 36.5 g/dL    RDW 13.2 10.0 - 15.0 %    Platelet Count 490 (H) 150 - 450 10e3/uL   Manual Differential    Collection Time: 09/09/22 10:06 AM   Result Value Ref Range    % Neutrophils 29 %    % Lymphocytes 43 %    % Monocytes 23 %    % Eosinophils 1 %    % Basophils 1 %    % Metamyelocytes 1 %    % Myelocytes 2 %    Absolute Neutrophils 1.8 1.6 - 8.3 10e3/uL    Absolute Lymphocytes 2.7 0.8 - 5.3 10e3/uL    Absolute Monocytes 1.4 (H) 0.0 - 1.3 10e3/uL    Absolute Eosinophils 0.1 0.0 - 0.7 10e3/uL    Absolute Basophils 0.1 0.0 - 0.2 10e3/uL    Absolute Metamyelocytes 0.1 (H) <=0.0 10e3/uL    Absolute Myelocytes 0.1 (H) <=0.0 10e3/uL    RBC Morphology Confirmed RBC Indices     Platelet Assessment  Automated Count Confirmed. Platelet morphology is normal.     Automated Count Confirmed. Platelet morphology is normal.

## 2022-09-09 NOTE — Clinical Note
9/9/2022         RE: Jessica Zamorano  [address]  [facility] [address]        Dear Colleague,    Thank you for referring your patient, Jessica Zamorano, to the [facility]. Please see a copy of my visit note below.    Oncology Follow-up Visit  Sep 9, 2022    Reason for Visit: follow up of sebaceous cell carcinoma of eyelid    Oncology History: Jessica Zamorano is a 71 year old female  with past medical history of sebaceous cell carcinoma of the eyelid. She was evaluated initially in 2021. A biopsy was performed consistent with sebaceous cell carcinoma. She was evaluated at [facility] in 2021 where she underwent mapping biopsies of the tumor with plans for surgery. 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 Imaging with PET scan and orbital exenteration was recommended. She declined imaging or surgery and was lost to follow-up following the biopsy.     She was seen by Dr. Orozco 3/22/22 for evaluation with concern for lesion involving the lid, conjunctiva and cornea. Biopsy confirmed sebaceous cell carcinoma. Due to the extent of disease orbit sparing would not be possible.     PET/CT performed 3/31/22 showed only local disease with deep breast uptake. Mammogram and subsequent biopsy  7/19/22 negative for atypica or malignancy and positive for fibroadenoma.     She met with Dr. Dobson 7/12/22 to discuss potential treatment options. She again declined curative surgery therefore palliative chemotherapy with carboplatin/gemcitabine was recommended.    She presents today for evaluation prior to C1D8 gemcitabine (carboplatin to begin with cycle 2).    Interval History:        Jessica feels she tolerated her first dose of gemcitabine well. She did experience constipation which was relieved with MiraLAX and dulcolax. Appetite is also somewhat decreased. She had a few, transient episodes of nausea. Did not require antiemetics. Her last nausea episodes  was relieved by eating a few soda crackers. She reports increased drainage from the eye with worsening cloudy vision that lasted a few days this past week although this has now improved.    Review of Systems:  10 point ROS neg other than the symptoms noted above in the HPI.      Current Outpatient Medications   Medication     acetaminophen (TYLENOL) 325 MG tablet     amLODIPine (NORVASC) 5 MG tablet     lidocaine-prilocaine (EMLA) 2.5-2.5 % external cream     LORazepam (ATIVAN) 0.5 MG tablet     melatonin 1 MG TABS tablet     methylPREDNISolone (MEDROL DOSEPAK) 4 MG tablet therapy pack     multivitamin w/minerals (THERA-VIT-M) tablet     ondansetron (ZOFRAN) 8 MG tablet     prochlorperazine (COMPAZINE) 10 MG tablet     No current facility-administered medications for this visit.       PHYSICAL EXAM:  There were no vitals taken for this visit.  General: Well-appearing female in no acute distress.  Eyes: Ptsosis of left upper eyelid, some scleral injection. EOMI, PERRL. No scleral icterus.  ENT: Oral exam deferred.  Cardiovascular: Regular rate and rhythm. No murmurs, gallops, or rubs. No peripheral edema.  Respiratory: Clear to auscultation bilaterally. No wheezes or crackles.  Neurologic: No focal deficits. Unable to thoroughly examine left eye movement.  Skin: Port incision to right chest accessed.No rashes, petechiae, or bruising noted on exposed skin.    Labs:   Most Recent 3 CBC's:  Recent Labs   Lab Test 08/28/22  1521 08/25/22  1337 08/19/22  1028 07/12/22  1542   WBC 13.5* 4.6 7.8 7.3   HGB 12.3 13.5 14.5 14.9   MCV 94 93 93 94   * 182 216 221   ANEUTAUTO  --  2.8 4.8 5.3    Most Recent 3 BMP's:  Recent Labs   Lab Test 08/28/22  1521 08/19/22  1028 07/12/22  1542    144 144   POTASSIUM 3.3* 3.7 3.8   CHLORIDE 106 109 110*   CO2 24 26 23   BUN 15 22 19   CR 0.83 0.85 0.85   ANIONGAP 5 9 11   MARAL 8.9 8.8 9.0   GLC 98 82 99   PROTTOTAL 7.0 7.4 7.7   ALBUMIN 3.4 4.0 4.2    Most Recent 2  LFT's:  Recent Labs   Lab Test 08/28/22  1521 08/19/22  1028   AST 17 24   ALT 24 21   ALKPHOS 78 104   BILITOTAL 1.3 0.4    Most Recent TSH and T4:No lab results found.  Phos/Mag:No results found for: PHOS, MAG     I reviewed the above labs today.    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 Dobson the plan for palliative chemotherapy as patient would not want surgery. She also prefers to avoid alopecia and so will be treated with carboplatin/gemcitabine. Began cycle 1 8/19/22 with gemcitabine alone (carboplatin omitted due to need to secure financial coverage). Tolerated first treatment well with mild constipation, appetite changes and nausea.  -Labs and exam acceptable for day 8 gemzar today  -RTC in 2 weeks for cycle 2 with addition of carboplatin (financial coverage now secure)  -EMLA prescribed for pain with port access    #Constipation  Continue prunes, MiraLAX or dulcolax PRN    #Nausea  Mild with gemzar alone. May require antiemetics with addition of carbo which she has at home    #Hypertension   -On amlodipine, tolerating well. BP slightly elevated today. Needs management by PCP but would like to set up with a provider who is closer to her home. Have reached out to [facility] to assist with this.     #Fibroadenoma on recent diagnostic mammogram  -Incidental finding on PET scan  -Diagnostic mammogram showed:  A. RIGHT BREAST, 8:00 POSITION, 3 CM FROM NIPPLE, BIOPSY:  -Fibroadenoma, hyalinized intracanalicular pattern.  -Negative for atypia or malignancy.   Will need annual mammograms    Katy Obregon PA-C  [facility]  [address] [place] [id number] [phone]        Again, thank you for allowing me to participate in the care of your patient.        Sincerely,        Katy Obregon PA-C

## 2022-09-14 NOTE — TELEPHONE ENCOUNTER
Prior Authorization Approval    Authorization Effective Date: 6/11/2022  Authorization Expiration Date: 9/9/2023  Medication: LORAZEPAM 0.5MG TABLET-PA approved  Approved Dose/Quantity:   Reference #:     Insurance Company: [employer] - Phone [phone] Fax [phone]  Expected CoPay:       CoPay Card Available:      Foundation Assistance Needed:    Which Pharmacy is filling the prescription (Not needed for infusion/clinic administered): [facility] [address] [place] [address] - [address] [place] [address]  Pharmacy Notified: Yes  Patient Notified: No

## 2022-09-15 ENCOUNTER — APPOINTMENT (OUTPATIENT)
Dept: LAB | Facility: CLINIC | Age: 72
End: 2022-09-15
Attending: INTERNAL MEDICINE
Payer: COMMERCIAL

## 2022-09-15 ENCOUNTER — ONCOLOGY VISIT (OUTPATIENT)
Dept: ONCOLOGY | Facility: CLINIC | Age: 72
End: 2022-09-15
Attending: INTERNAL MEDICINE
Payer: COMMERCIAL

## 2022-09-15 VITALS
RESPIRATION RATE: 18 BRPM | OXYGEN SATURATION: 97 % | TEMPERATURE: 98.1 F | HEART RATE: 109 BPM | SYSTOLIC BLOOD PRESSURE: 166 MMHG | BODY MASS INDEX: 23.26 KG/M2 | WEIGHT: 131.3 LBS | DIASTOLIC BLOOD PRESSURE: 85 MMHG

## 2022-09-15 VITALS — SYSTOLIC BLOOD PRESSURE: 153 MMHG | DIASTOLIC BLOOD PRESSURE: 81 MMHG

## 2022-09-15 DIAGNOSIS — C76.0 MALIGNANT NEOPLASM OF HEAD, FACE, AND NECK (H): Primary | ICD-10-CM

## 2022-09-15 DIAGNOSIS — B37.0 THRUSH: ICD-10-CM

## 2022-09-15 LAB
BASOPHILS # BLD AUTO: 0 10E3/UL (ref 0–0.2)
BASOPHILS NFR BLD AUTO: 0 %
EOSINOPHIL # BLD AUTO: 0 10E3/UL (ref 0–0.7)
EOSINOPHIL NFR BLD AUTO: 0 %
ERYTHROCYTE [DISTWIDTH] IN BLOOD BY AUTOMATED COUNT: 12.7 % (ref 10–15)
HCT VFR BLD AUTO: 41.2 % (ref 35–47)
HGB BLD-MCNC: 13.8 G/DL (ref 11.7–15.7)
IMM GRANULOCYTES # BLD: 0 10E3/UL
IMM GRANULOCYTES NFR BLD: 1 %
LYMPHOCYTES # BLD AUTO: 1.3 10E3/UL (ref 0.8–5.3)
LYMPHOCYTES NFR BLD AUTO: 21 %
MCH RBC QN AUTO: 30.9 PG (ref 26.5–33)
MCHC RBC AUTO-ENTMCNC: 33.5 G/DL (ref 31.5–36.5)
MCV RBC AUTO: 92 FL (ref 78–100)
MONOCYTES # BLD AUTO: 0.2 10E3/UL (ref 0–1.3)
MONOCYTES NFR BLD AUTO: 3 %
NEUTROPHILS # BLD AUTO: 4.8 10E3/UL (ref 1.6–8.3)
NEUTROPHILS NFR BLD AUTO: 75 %
NRBC # BLD AUTO: 0 10E3/UL
NRBC BLD AUTO-RTO: 0 /100
PLATELET # BLD AUTO: 352 10E3/UL (ref 150–450)
RBC # BLD AUTO: 4.47 10E6/UL (ref 3.8–5.2)
WBC # BLD AUTO: 6.4 10E3/UL (ref 4–11)

## 2022-09-15 PROCEDURE — 85004 AUTOMATED DIFF WBC COUNT: CPT | Performed by: STUDENT IN AN ORGANIZED HEALTH CARE EDUCATION/TRAINING PROGRAM

## 2022-09-15 PROCEDURE — 250N000011 HC RX IP 250 OP 636: Performed by: INTERNAL MEDICINE

## 2022-09-15 PROCEDURE — 258N000003 HC RX IP 258 OP 636: Performed by: STUDENT IN AN ORGANIZED HEALTH CARE EDUCATION/TRAINING PROGRAM

## 2022-09-15 PROCEDURE — G0463 HOSPITAL OUTPT CLINIC VISIT: HCPCS

## 2022-09-15 PROCEDURE — 96413 CHEMO IV INFUSION 1 HR: CPT

## 2022-09-15 PROCEDURE — 250N000011 HC RX IP 250 OP 636: Performed by: STUDENT IN AN ORGANIZED HEALTH CARE EDUCATION/TRAINING PROGRAM

## 2022-09-15 PROCEDURE — 99214 OFFICE O/P EST MOD 30 MIN: CPT | Performed by: PHYSICIAN ASSISTANT

## 2022-09-15 PROCEDURE — 99207 PR NO BILLABLE SERVICE THIS VISIT: CPT

## 2022-09-15 PROCEDURE — 96375 TX/PRO/DX INJ NEW DRUG ADDON: CPT

## 2022-09-15 RX ORDER — HEPARIN SODIUM (PORCINE) LOCK FLUSH IV SOLN 100 UNIT/ML 100 UNIT/ML
5 SOLUTION INTRAVENOUS
Status: DISCONTINUED | OUTPATIENT
Start: 2022-09-15 | End: 2022-09-15 | Stop reason: HOSPADM

## 2022-09-15 RX ORDER — HEPARIN SODIUM (PORCINE) LOCK FLUSH IV SOLN 100 UNIT/ML 100 UNIT/ML
5 SOLUTION INTRAVENOUS ONCE
Status: COMPLETED | OUTPATIENT
Start: 2022-09-15 | End: 2022-09-15

## 2022-09-15 RX ORDER — NYSTATIN 100000/ML
500000 SUSPENSION, ORAL (FINAL DOSE FORM) ORAL 4 TIMES DAILY
Qty: 200 ML | Refills: 0 | Status: SHIPPED | OUTPATIENT
Start: 2022-09-15 | End: 2022-09-25

## 2022-09-15 RX ORDER — DEXAMETHASONE 4 MG/1
4 TABLET ORAL 2 TIMES DAILY WITH MEALS
Qty: 6 TABLET | Refills: 11 | Status: SHIPPED | OUTPATIENT
Start: 2022-09-15 | End: 2022-09-30

## 2022-09-15 RX ADMIN — GEMCITABINE 1600 MG: 38 INJECTION, SOLUTION INTRAVENOUS at 14:20

## 2022-09-15 RX ADMIN — SODIUM CHLORIDE 250 ML: 9 INJECTION, SOLUTION INTRAVENOUS at 13:58

## 2022-09-15 RX ADMIN — DEXAMETHASONE SODIUM PHOSPHATE 12 MG: 10 INJECTION, SOLUTION INTRAMUSCULAR; INTRAVENOUS at 13:59

## 2022-09-15 RX ADMIN — Medication 5 ML: at 14:51

## 2022-09-15 RX ADMIN — HEPARIN 5 ML: 100 SYRINGE at 12:20

## 2022-09-15 ASSESSMENT — PAIN SCALES - GENERAL: PAINLEVEL: NO PAIN (0)

## 2022-09-15 NOTE — NURSING NOTE
Chief Complaint   Patient presents with     Port Draw     Labs drawn via port by RN in lab. VS taken.      Port accessed with 20 gauge 3/4 inch flat needle and labs drawn by RN.  Port flushed with saline and heparin.  Pt tolerated well.  VS taken.  Pt checked in for next appt.    Keri Richards RN

## 2022-09-15 NOTE — PROGRESS NOTES
Infusion Nursing Note:  Jessica Zamorano presents today for C2D8 Gemzar.    Patient seen by provider today: Yes: Katy SPARKS   present during visit today: Not Applicable.    Note: Instruction given to patient as per provider. Verbalized understanding. BP noted by provider.     Intravenous Access:  Implanted Port.    Treatment Conditions:  Lab Results   Component Value Date    HGB 13.8 09/15/2022    WBC 6.4 09/15/2022    ANEU 1.8 09/09/2022    ANEUTAUTO 4.8 09/15/2022     09/15/2022      Results reviewed, labs MET treatment parameters, ok to proceed with treatment.      TORB: 9/15/22/Katy SPARKS/Larisa River RN/ Give Dexamethasone as premeds and for home as well.     Post Infusion Assessment:  Patient tolerated infusion without incident.  Blood return noted pre and post infusion.  Site patent and intact, free from redness, edema or discomfort.  No evidence of extravasations.  Access discontinued per protocol.     Discharge Plan:   Prescription refills given for Dexamethasone and Nystatin.  Discharge instructions reviewed with: Patient.  Patient and/or family verbalized understanding of discharge instructions and all questions answered.  Copy of AVS reviewed with patient and/or family.  Patient will return 9/30/22 for next appointment.  Patient discharged in stable condition accompanied by: self.  Departure Mode: Ambulatory.      MAURICE DIAZ RN

## 2022-09-15 NOTE — PROGRESS NOTES
Oncology Follow-up Visit  Sep 15, 2022    Reason for Visit: follow up of sebaceous cell carcinoma of eyelid    Oncology History: Jessica Zamorano is a 71 year old female  with past medical history of sebaceous cell carcinoma of the eyelid. She was evaluated initially in 2021. A biopsy was performed consistent with sebaceous cell carcinoma. She was evaluated at [facility] in 2021 where she underwent mapping biopsies of the tumor with plans for surgery. 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 Imaging with PET scan and orbital exenteration was recommended. She declined imaging or surgery and was lost to follow-up following the biopsy.     She was seen by Dr. Orozco 3/22/22 for evaluation with concern for lesion involving the lid, conjunctiva and cornea. Biopsy confirmed sebaceous cell carcinoma. Due to the extent of disease orbit sparing would not be possible.     PET/CT performed 3/31/22 showed only local disease with deep breast uptake. Mammogram and subsequent biopsy  7/19/22 negative for atypica or malignancy and positive for fibroadenoma.     She met with Dr. Dobson 7/12/22 to discuss potential treatment options. She again declined curative surgery therefore palliative chemotherapy with carboplatin/gemcitabine was recommended.    Carboplatin was added with cycle 2.     She presents today for evaluation prior to C2D8 gemcitabine.    Interval History:  Patient reports that she has had intermittent nausea over the last couple of days and has found some relief from Compazine.  She did have a lower appetite yesterday and did vomit once.  She has noticed some increased burping as well.  She denies any abdominal pain.  She has noticed a crack on the left side of her lips and has been applying petroleum jelly for this.  She notes that following chemotherapy her left eye did feel dry with a film that has persisted.  She attributes this to the IV steroids and so did not immediately start  her oral steroids.  She did not use lubricating eyedrops as she feels her medial portion of her left eye is very sensitive.  She subsequently developed itchiness on her abdomen and then did start her oral steroids with improvement in her symptoms.  She notes that her weight has gone down and is receiving open arms meals.  She has noticed some hair loss. She denies other concerns.    Current Outpatient Medications   Medication     acetaminophen (TYLENOL) 325 MG tablet     amLODIPine (NORVASC) 5 MG tablet     dexamethasone (DECADRON) 4 MG tablet     lidocaine-prilocaine (EMLA) 2.5-2.5 % external cream     LORazepam (ATIVAN) 0.5 MG tablet     melatonin 1 MG TABS tablet     methylPREDNISolone (MEDROL DOSEPAK) 4 MG tablet therapy pack     multivitamin w/minerals (THERA-VIT-M) tablet     nystatin (MYCOSTATIN) 869704 UNIT/ML suspension     ondansetron (ZOFRAN) 8 MG tablet     prochlorperazine (COMPAZINE) 10 MG tablet     No current facility-administered medications for this visit.     Facility-Administered Medications Ordered in Other Visits   Medication     gemcitabine (GEMZAR) 1,600 mg in sodium chloride 0.9 % 317 mL infusion     heparin 100 UNIT/ML injection 5 mL     sodium chloride (PF) 0.9% PF flush 3-20 mL     Physical Exam:  General: The patient is a pleasant female in no acute distress.  BP (!) 166/85 (BP Location: Right arm, Patient Position: Sitting, Cuff Size: Adult Regular)   Pulse 109   Temp 98.1  F (36.7  C) (Oral)   Resp 18   Wt 59.6 kg (131 lb 4.8 oz)   SpO2 97%   BMI 23.26 kg/m    Wt Readings from Last 10 Encounters:   09/15/22 59.6 kg (131 lb 4.8 oz)   09/09/22 61.4 kg (135 lb 6.4 oz)   08/28/22 62.6 kg (138 lb)   08/25/22 62.1 kg (136 lb 12.8 oz)   08/19/22 63.2 kg (139 lb 6.4 oz)   08/12/22 63.5 kg (140 lb)   07/12/22 63.5 kg (140 lb)   04/01/22 61.2 kg (135 lb)   03/22/22 61.2 kg (135 lb)   HEENT: Ptsosis of left upper eyelid, some scleral injection, minimal light yellow drainage noted at medial  portion of left eye. EOMI. Sclerae are anicteric. Mild thrush noted on cheeks and back of tongue.   Lymph: Neck is supple with no lymphadenopathy in the cervical or supraclavicular areas.   Heart: Regular rate and rhythm.   Lungs: Clear to auscultation bilaterally.   Abdomen: Bowel sounds present, soft, nontender with no palpable hepatosplenomegaly or masses.   Extremities: No lower extremity edema noted bilaterally.   Neuro: No focal deficits.  Skin: No rashes, petechiae, or bruising noted on exposed skin.     Labs:   Most Recent 3 CBC's:  Recent Labs   Lab Test 09/15/22  1225 09/09/22  1006 08/28/22  1521 08/25/22  1337 08/19/22  1028   WBC 6.4 6.3 13.5* 4.6 7.8   HGB 13.8 13.4 12.3 13.5 14.5   MCV 92 94 94 93 93    490* 108* 182 216   ANEUTAUTO 4.8  --   --  2.8 4.8    Most Recent 3 BMP's:  Recent Labs   Lab Test 09/09/22  1006 08/28/22  1521 08/19/22  1028    135 144   POTASSIUM 4.0 3.3* 3.7   CHLORIDE 104 106 109   CO2 23 24 26   BUN 19.1 15 22   CR 0.85 0.83 0.85   ANIONGAP 13 5 9   MARAL 9.0 8.9 8.8   GLC 91 98 82   PROTTOTAL 6.5 7.0 7.4   ALBUMIN 4.0 3.4 4.0    Most Recent 2 LFT's:  Recent Labs   Lab Test 09/09/22  1006 08/28/22  1521   AST 28 17   ALT 31 24   ALKPHOS 95 78   BILITOTAL 0.2 1.3   I reviewed the above labs today.    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 She also prefers to avoid alopecia and so is being treated with carboplatin/gemcitabine. Began cycle 1 8/19/22 with gemcitabine alone (carboplatin omitted due to need to secure financial coverage). She had some difficulty with headaches and a rash with plan for management outlined below. Carboplatin was added to gemcitabine with cycle 2. She is doing reasonably well today and will continue with cycle 2 day 8 Gemzar. She will follow-up with us prior to consideration of cycle 3.     #Constipation  Continue MiraLAX or  dulcolax PRN    #Nausea  Mild. Will continue to use Compazine prn. Discussed trying ginger lozenges or ginger ale to help with increased belching.     #Hypertension   -On amlodipine, tolerating well. Encouraged follow-up with her PCP to assist with elevated BP. Will recheck in infusion today.     #Fibroadenoma on recent diagnostic mammogram  -Incidental finding on PET scan  -Diagnostic mammogram showed:  A. RIGHT BREAST, 8:00 POSITION, 3 CM FROM NIPPLE, BIOPSY:  -Fibroadenoma, hyalinized intracanalicular pattern.  -Negative for atypia or malignancy.   Will need annual mammograms    #Rash  Secondary to Gemzar. Now resolved. Will receive 12 mg dexamethasone IV with her infusions. She will also take 4 mg dexamethasone bid for 3 days following her infusions.     #Thrush  Secondary to steroids. If does well with this cycle, may consider trying to decrease her steroids a little with the next cycle. Will start Nystatin swishes qid x 10 days.     #Eye irritation  Recommend using lubricating eye drops and baby shampoo along with warm compresses.    Katy Obregon PA-C  [facility]  [address] [place] [address]  [phone]    37 minutes spent on the date of the encounter doing chart review, review of test results, interpretation of tests, patient visit and documentation

## 2022-09-15 NOTE — LETTER
9/15/2022         RE: Jessica Zamorano  [address]  [facility] [address]      Oncology Follow-up Visit  Sep 15, 2022    Reason for Visit: follow up of sebaceous cell carcinoma of eyelid    Oncology History: Jessica Zamorano is a 71 year old female  with past medical history of sebaceous cell carcinoma of the eyelid. She was evaluated initially in 2021. A biopsy was performed consistent with sebaceous cell carcinoma. She was evaluated at [facility] in 2021 where she underwent mapping biopsies of the tumor with plans for surgery. 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 Imaging with PET scan and orbital exenteration was recommended. She declined imaging or surgery and was lost to follow-up following the biopsy.     She was seen by Dr. Orozco 3/22/22 for evaluation with concern for lesion involving the lid, conjunctiva and cornea. Biopsy confirmed sebaceous cell carcinoma. Due to the extent of disease orbit sparing would not be possible.     PET/CT performed 3/31/22 showed only local disease with deep breast uptake. Mammogram and subsequent biopsy  7/19/22 negative for atypica or malignancy and positive for fibroadenoma.     She met with Dr. Dobson 7/12/22 to discuss potential treatment options. She again declined curative surgery therefore palliative chemotherapy with carboplatin/gemcitabine was recommended.    Carboplatin was added with cycle 2.     She presents today for evaluation prior to C2D8 gemcitabine.    Interval History:  Patient reports that she has had intermittent nausea over the last couple of days and has found some relief from Compazine.  She did have a lower appetite yesterday and did vomit once.  She has noticed some increased burping as well.  She denies any abdominal pain.  She has noticed a crack on the left side of her lips and has been applying petroleum jelly for this.  She notes that following chemotherapy her left eye did feel dry with a  film that has persisted.  She attributes this to the IV steroids and so did not immediately start her oral steroids.  She did not use lubricating eyedrops as she feels her medial portion of her left eye is very sensitive.  She subsequently developed itchiness on her abdomen and then did start her oral steroids with improvement in her symptoms.  She notes that her weight has gone down and is receiving open arms meals.  She has noticed some hair loss. She denies other concerns.    Current Outpatient Medications   Medication     acetaminophen (TYLENOL) 325 MG tablet     amLODIPine (NORVASC) 5 MG tablet     dexamethasone (DECADRON) 4 MG tablet     lidocaine-prilocaine (EMLA) 2.5-2.5 % external cream     LORazepam (ATIVAN) 0.5 MG tablet     melatonin 1 MG TABS tablet     methylPREDNISolone (MEDROL DOSEPAK) 4 MG tablet therapy pack     multivitamin w/minerals (THERA-VIT-M) tablet     nystatin (MYCOSTATIN) 566975 UNIT/ML suspension     ondansetron (ZOFRAN) 8 MG tablet     prochlorperazine (COMPAZINE) 10 MG tablet     No current facility-administered medications for this visit.     Facility-Administered Medications Ordered in Other Visits   Medication     gemcitabine (GEMZAR) 1,600 mg in sodium chloride 0.9 % 317 mL infusion     heparin 100 UNIT/ML injection 5 mL     sodium chloride (PF) 0.9% PF flush 3-20 mL     Physical Exam:  General: The patient is a pleasant female in no acute distress.  BP (!) 166/85 (BP Location: Right arm, Patient Position: Sitting, Cuff Size: Adult Regular)   Pulse 109   Temp 98.1  F (36.7  C) (Oral)   Resp 18   Wt 59.6 kg (131 lb 4.8 oz)   SpO2 97%   BMI 23.26 kg/m    Wt Readings from Last 10 Encounters:   09/15/22 59.6 kg (131 lb 4.8 oz)   09/09/22 61.4 kg (135 lb 6.4 oz)   08/28/22 62.6 kg (138 lb)   08/25/22 62.1 kg (136 lb 12.8 oz)   08/19/22 63.2 kg (139 lb 6.4 oz)   08/12/22 63.5 kg (140 lb)   07/12/22 63.5 kg (140 lb)   04/01/22 61.2 kg (135 lb)   03/22/22 61.2 kg (135 lb)   HEENT:  Ptsosis of left upper eyelid, some scleral injection, minimal light yellow drainage noted at medial portion of left eye. EOMI. Sclerae are anicteric. Mild thrush noted on cheeks and back of tongue.   Lymph: Neck is supple with no lymphadenopathy in the cervical or supraclavicular areas.   Heart: Regular rate and rhythm.   Lungs: Clear to auscultation bilaterally.   Abdomen: Bowel sounds present, soft, nontender with no palpable hepatosplenomegaly or masses.   Extremities: No lower extremity edema noted bilaterally.   Neuro: No focal deficits.  Skin: No rashes, petechiae, or bruising noted on exposed skin.     Labs:   Most Recent 3 CBC's:  Recent Labs   Lab Test 09/15/22  1225 09/09/22  1006 08/28/22  1521 08/25/22  1337 08/19/22  1028   WBC 6.4 6.3 13.5* 4.6 7.8   HGB 13.8 13.4 12.3 13.5 14.5   MCV 92 94 94 93 93    490* 108* 182 216   ANEUTAUTO 4.8  --   --  2.8 4.8    Most Recent 3 BMP's:  Recent Labs   Lab Test 09/09/22  1006 08/28/22  1521 08/19/22  1028    135 144   POTASSIUM 4.0 3.3* 3.7   CHLORIDE 104 106 109   CO2 23 24 26   BUN 19.1 15 22   CR 0.85 0.83 0.85   ANIONGAP 13 5 9   MARAL 9.0 8.9 8.8   GLC 91 98 82   PROTTOTAL 6.5 7.0 7.4   ALBUMIN 4.0 3.4 4.0    Most Recent 2 LFT's:  Recent Labs   Lab Test 09/09/22  1006 08/28/22  1521   AST 28 17   ALT 31 24   ALKPHOS 95 78   BILITOTAL 0.2 1.3   I reviewed the above labs today.    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 She also prefers to avoid alopecia and so is being treated with carboplatin/gemcitabine. Began cycle 1 8/19/22 with gemcitabine alone (carboplatin omitted due to need to secure financial coverage). She had some difficulty with headaches and a rash with plan for management outlined below. Carboplatin was added to gemcitabine with cycle 2. She is doing reasonably well today and will continue with cycle 2 day 8 Gemzar.  She will follow-up with us prior to consideration of cycle 3.     #Constipation  Continue MiraLAX or dulcolax PRN    #Nausea  Mild. Will continue to use Compazine prn. Discussed trying ginger lozenges or ginger ale to help with increased belching.     #Hypertension   -On amlodipine, tolerating well. Encouraged follow-up with her PCP to assist with elevated BP. Will recheck in infusion today.     #Fibroadenoma on recent diagnostic mammogram  -Incidental finding on PET scan  -Diagnostic mammogram showed:  A. RIGHT BREAST, 8:00 POSITION, 3 CM FROM NIPPLE, BIOPSY:  -Fibroadenoma, hyalinized intracanalicular pattern.  -Negative for atypia or malignancy.   Will need annual mammograms    #Rash  Secondary to Gemzar. Now resolved. Will receive 12 mg dexamethasone IV with her infusions. She will also take 4 mg dexamethasone bid for 3 days following her infusions.     #Thrush  Secondary to steroids. If does well with this cycle, may consider trying to decrease her steroids a little with the next cycle. Will start Nystatin swishes qid x 10 days.     #Eye irritation  Recommend using lubricating eye drops and baby shampoo along with warm compresses.    Katy Obregon PA-C  [facility]  [address] [place], [place] [address] [phone]    37 minutes spent on the date of the encounter doing chart review, review of test results, interpretation of tests, patient visit and documentation

## 2022-09-15 NOTE — NURSING NOTE
"Oncology Rooming Note    September 15, 2022 12:57 PM   Jessica Zamorano is a 71 year old female who presents for:    Chief Complaint   Patient presents with     Port Draw     Labs drawn via port by RN in lab. VS taken.      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    Weight as of this encounter: 59.6 kg (131 lb 4.8 oz). Body surface area is 1.63 meters squared.  No Pain (0) Comment: Data Unavailable   No LMP recorded. Patient is postmenopausal.  Allergies reviewed: Yes  Medications reviewed: Yes    Medications: Medication refills not needed today.  Pharmacy name entered into [facility]: [facility] [address] [place] [address].    Clinical concerns: Patient reports experiencing nausea and vomiting last night. Please discuss anti-nausea medication and when to administer. Please discuss dry eye issue-states that it is worse.        Carrie Arellano LPN September 15, 2022 12:59 PM              "

## 2022-09-15 NOTE — PATIENT INSTRUCTIONS
Contact Numbers  [facility]: [phone]    After Hours:  [phone]  Triage: [phone]    Please call the [facility] Triage line if you experience a temperature greater than or equal to 100.5, shaking chills, have uncontrolled nausea, vomiting and/or diarrhea, dizziness, shortness of breath, chest pain, bleeding, unexplained bruising, or if you have any other new/concerning symptoms, questions or concerns.     If it is after hours, weekends, or holidays, please call the main hospital  at  [phone] and ask to speak to the Oncology doctor on call.     If you are having any concerning symptoms or wish to speak to a provider before your next infusion visit, please call your care coordinator or triage to notify them so we can adequately serve you.     If you need a refill on a narcotic prescription or other medication, please call triage before your infusion appointment.       September 2022 Sunday Monday Tuesday Wednesday Thursday Friday Saturday                       1     2     3       4     5     6     7     8     9    LAB CENTRAL   9:45 AM   (15 min.)   [facility] DRAW   [facility]    RETURN  10:00 AM   (45 min.)   Katy Obregon PA-C   [facility]    ONC INFUSION 2 HR (120 MIN)  11:00 AM   (120 min.)    ONC INFUSION NURSE   [facility] 10       11     12     13     14     15    LAB CENTRAL  12:45 PM   (15 min.)   [facility] DRAW   [facility]    RETURN   1:00 PM   (45 min.)   Katy Obregon PA-C   [facility]    ONC INFUSION 1 HR (60 MIN)   2:00 PM   (60 min.)    ONC INFUSION NURSE   [facility] 16     17       18     19     20     21     22     23     24       25     26     27     28     29     30    LAB CENTRAL  10:15 AM   (15 min.)   [facility] DRAW   [facility]    RETURN  10:30 AM   (45 min.)   aKty Ibanez CNP   [facility]    ONC INFUSION 2 HR (120 MIN)  12:00 PM   (120 min.)   [facility] INFUSION NURSE   [facility]                    October 2022 Sunday Monday Tuesday Wednesday Thursday Friday Saturday                                 1       2     3     4     5     6    LAB CENTRAL  12:00 PM   (15 min.)   [facility] LAB DRAW   [facility]    RETURN  12:15 PM   (45 min.)   Katy Ibanez CNP   [facility]    ONC INFUSION 1 HR (60 MIN)   1:30 PM   (60 min.)    ONC INFUSION NURSE   [facility] 7     8       9     10     11     12     13     14     15       16     17     18     19     20     21     22       23     24     25     26     27     28     29       30     31                                                Lab Results:  Recent Results (from the past 12 hour(s))   CBC with platelets and differential    Collection Time: 09/15/22 12:25 PM   Result Value Ref Range    WBC Count 6.4 4.0 - 11.0 10e3/uL    RBC Count 4.47 3.80 - 5.20 10e6/uL    Hemoglobin 13.8 11.7 - 15.7 g/dL    Hematocrit 41.2 35.0 - 47.0 %    MCV 92 78 - 100 fL    MCH 30.9 26.5 - 33.0 pg    MCHC 33.5 31.5 - 36.5 g/dL    RDW 12.7 10.0 - 15.0 %    Platelet Count 352 150 - 450 10e3/uL    % Neutrophils 75 %    % Lymphocytes 21 %    % Monocytes 3 %    % Eosinophils 0 %    % Basophils 0 %    % Immature Granulocytes 1 %    NRBCs per 100 WBC 0 <1 /100    Absolute Neutrophils 4.8 1.6 - 8.3 10e3/uL    Absolute Lymphocytes 1.3 0.8 - 5.3 10e3/uL    Absolute Monocytes 0.2 0.0 - 1.3 10e3/uL    Absolute Eosinophils 0.0 0.0 - 0.7 10e3/uL    Absolute Basophils 0.0 0.0 - 0.2 10e3/uL    Absolute Immature Granulocytes 0.0 <=0.4 10e3/uL    Absolute NRBCs 0.0 10e3/uL

## 2022-09-16 ENCOUNTER — PATIENT OUTREACH (OUTPATIENT)
Dept: ONCOLOGY | Facility: CLINIC | Age: 72
End: 2022-09-16

## 2022-09-16 NOTE — PROGRESS NOTES
[facility]: Cancer Care                                                                                        Received call from Jessica.  Reports she has taken compazine x 2 and both times it has caused her to vomit 1 hours later.  She is not having nausea, but wanted to take compazine to prevent nausea.      Recommended she hold the compazine today so she is able to eat and take her steroid.  Also recommended she call me back today if she has a reoccurrence of vomiting.     Signature:  Bridgette Brian RNCC

## 2022-09-30 ENCOUNTER — OFFICE VISIT (OUTPATIENT)
Dept: FAMILY MEDICINE | Facility: CLINIC | Age: 72
End: 2022-09-30
Payer: COMMERCIAL

## 2022-09-30 VITALS
RESPIRATION RATE: 16 BRPM | HEART RATE: 112 BPM | TEMPERATURE: 97.3 F | HEIGHT: 63 IN | DIASTOLIC BLOOD PRESSURE: 72 MMHG | BODY MASS INDEX: 22.15 KG/M2 | WEIGHT: 125 LBS | OXYGEN SATURATION: 97 % | SYSTOLIC BLOOD PRESSURE: 138 MMHG

## 2022-09-30 DIAGNOSIS — R30.0 DYSURIA: ICD-10-CM

## 2022-09-30 DIAGNOSIS — I10 BENIGN ESSENTIAL HTN: ICD-10-CM

## 2022-09-30 DIAGNOSIS — K64.4 EXTERNAL HEMORRHOIDS: ICD-10-CM

## 2022-09-30 DIAGNOSIS — Z76.89 ENCOUNTER TO ESTABLISH CARE: Primary | ICD-10-CM

## 2022-09-30 LAB
CLUE CELLS: NORMAL
TRICHOMONAS, WET PREP: NORMAL
WBC'S/HIGH POWER FIELD, WET PREP: NORMAL
YEAST, WET PREP: NORMAL

## 2022-09-30 PROCEDURE — 90662 IIV NO PRSV INCREASED AG IM: CPT | Performed by: NURSE PRACTITIONER

## 2022-09-30 PROCEDURE — G0008 ADMIN INFLUENZA VIRUS VAC: HCPCS | Performed by: NURSE PRACTITIONER

## 2022-09-30 PROCEDURE — 99214 OFFICE O/P EST MOD 30 MIN: CPT | Mod: 25 | Performed by: NURSE PRACTITIONER

## 2022-09-30 PROCEDURE — 87210 SMEAR WET MOUNT SALINE/INK: CPT | Performed by: NURSE PRACTITIONER

## 2022-09-30 RX ORDER — AMLODIPINE BESYLATE 5 MG/1
5 TABLET ORAL DAILY
Qty: 90 TABLET | Refills: 1 | Status: SHIPPED | OUTPATIENT
Start: 2022-09-30 | End: 2023-04-25

## 2022-09-30 RX ORDER — HYDROCORTISONE 25 MG/G
CREAM TOPICAL 2 TIMES DAILY PRN
Qty: 30 G | Refills: 0 | Status: SHIPPED | OUTPATIENT
Start: 2022-09-30 | End: 2023-12-01

## 2022-09-30 NOTE — PROGRESS NOTES
Assessment & Plan     Encounter to establish care  Reviewed medical/social/family history and health maintenance    Dysuria  Was unable to leave a UA, wet prep negative.  No symptoms of dysuria.  We discussed dehydration can cause changes in smell of urine.  Reassurance provided, encouraged plenty of fluid.  - Wet prep - Clinic Collect  - UA Macro with Reflex to Micro and Culture - lab collect; Future    External hemorrhoids  Small external hemorrhoid.  Advised to avoid constipation with fiber supplements, use cream 2-3 times a day.    - hydrocortisone, Perianal, (HYDROCORTISONE) 2.5 % cream; Place rectally 2 times daily as needed for hemorrhoids    Benign essential HTN   Stable, tolerating med, no changes at this time  - amLODIPine (NORVASC) 5 MG tablet; Take 1 tablet (5 mg) by mouth daily        Prescription drug management  I spent a total of 30 minutes on the day of the visit.   Time spent doing chart review, history and exam, documentation and further activities per the note           No follow-ups on file.   Follow-up Visit   Expected date:  Oct 30, 2022 (Approximate)      Follow Up Appointment Details:     Follow-up with whom?: Me    Follow-Up for what?: Medicare Wellness    Welcome or Annual?: Annual Wellness    How?: In Person    Is this an as-needed follow-up?: No                    KWAME Calles [facility]    Rowdy Lopez is a 71 year old, presenting for the following health issues:  Vaginal Problem, Constipation, and Hypertension      History of Present Illness       Reason for visit:  Odor  Symptom onset:  3-7 days ago  Symptom intensity:  Moderate  Symptom progression:  Staying the same  Had these symptoms before:  No  What makes it worse:  No  What makes it better:  Sleep    She eats 2-3 servings of fruits and vegetables daily.She consumes 1 sweetened beverage(s) daily.She exercises with enough effort to increase her heart rate 10 to 19 minutes per day.   "She exercises with enough effort to increase her heart rate 3 or less days per week.   She is taking medications regularly.           Hypertension Follow-up      Do you check your blood pressure regularly outside of the clinic? No     Are you following a low salt diet? No    Are your blood pressures ever more than 140 on the top number (systolic) OR more   than 90 on the bottom number (diastolic), for example 140/90? No    Vaginal Symptoms  Onset/Duration: 1 week   Description:  Vaginal Discharge: none   Itching (Pruritis): No  Burning sensation:  No  Odor: YES  Accompanying Signs & Symptoms:  Urinary symptoms: No  Abdominal pain: No  Fever: No  History:   Sexually active: No  New Partner: No  Possibility of Pregnancy:  No  Recent antibiotic use: No  Previous vaginitis issues: No  Precipitating or alleviating factors: None  Therapies tried and outcome: none    Additional: stopped Zofran over a week ago and had no BM for 1 week. Took Senna tea that helped her have first BM yesterday. Little stool, not hard or loose, had bad odor sincev having chemo and small BM this morning with no help of senna tea. Has tried miralax and dulcolax       Hadn't eaten in a week, lost some weight.  Was on zofran and had constipation.  2 days ago and today able to have a small BM.      Normally doesn't struggle with constipation.  Sore spot near anus, especially after wiping.      Review of Systems   Constitutional, HEENT, cardiovascular, pulmonary, gi and gu systems are negative, except as otherwise noted.      Objective    /72 (BP Location: Right arm, Patient Position: Sitting, Cuff Size: Adult Regular)   Pulse 112   Temp 97.3  F (36.3  C) (Temporal)   Resp 16   Ht 1.6 m (5' 3\")   Wt 56.7 kg (125 lb)   SpO2 97%   BMI 22.14 kg/m    Body mass index is 22.14 kg/m .  Physical Exam   GENERAL: healthy, alert and no distress  EYES: Eyes grossly normal to inspection, PERRL and conjunctivae and sclerae normal  RESP: lungs clear to " auscultation - no rales, rhonchi or wheezes  CV: regular rate and rhythm, normal S1 S2, no S3 or S4, no murmur, click or rub, no peripheral edema and peripheral pulses strong  ABDOMEN: soft, nontender, no hepatosplenomegaly, no masses and bowel sounds normal  :  Small external hemorrhoid noted, no vagial discharge or odor noted.    MS: no gross musculoskeletal defects noted, no edema                     son

## 2022-10-03 DIAGNOSIS — C44.131 SEBACEOUS CELL CARCINOMA OF SKIN OF EYELID, INCLUDING CANTHUS: Primary | ICD-10-CM

## 2022-10-03 NOTE — PROGRESS NOTES
Spoke with Jessica last week.  She has decided to discontinue chemo due to the side effects she has been experiencing.      Requesting to have her port removed.  Followed up with her today to ensure she wanted to move forward with having her port removed.  She would like to proceed.     IR port removal order placed.    Bridgette Brian, ANDRIYCC

## 2022-10-17 ENCOUNTER — ANCILLARY PROCEDURE (OUTPATIENT)
Dept: RADIOLOGY | Facility: AMBULATORY SURGERY CENTER | Age: 72
End: 2022-10-17
Attending: INTERNAL MEDICINE
Payer: COMMERCIAL

## 2022-10-17 ENCOUNTER — HOSPITAL ENCOUNTER (OUTPATIENT)
Facility: AMBULATORY SURGERY CENTER | Age: 72
Discharge: HOME | End: 2022-10-17
Attending: RADIOLOGY | Admitting: RADIOLOGY
Payer: COMMERCIAL

## 2022-10-17 VITALS
RESPIRATION RATE: 18 BRPM | DIASTOLIC BLOOD PRESSURE: 58 MMHG | OXYGEN SATURATION: 98 % | WEIGHT: 126 LBS | BODY MASS INDEX: 23.79 KG/M2 | HEIGHT: 61 IN | TEMPERATURE: 97.7 F | HEART RATE: 72 BPM | SYSTOLIC BLOOD PRESSURE: 122 MMHG

## 2022-10-17 LAB
ERYTHROCYTE [DISTWIDTH] IN BLOOD BY AUTOMATED COUNT: 15.9 % (ref 10–15)
HCT VFR BLD AUTO: 34.4 % (ref 35–47)
HGB BLD-MCNC: 11.1 G/DL (ref 11.7–15.7)
MCH RBC QN AUTO: 31.2 PG (ref 26.5–33)
MCHC RBC AUTO-ENTMCNC: 32.3 G/DL (ref 31.5–36.5)
MCV RBC AUTO: 97 FL (ref 78–100)
PLATELET # BLD AUTO: 366 10E3/UL (ref 150–450)
RBC # BLD AUTO: 3.56 10E6/UL (ref 3.8–5.2)
WBC # BLD AUTO: 8.3 10E3/UL (ref 4–11)

## 2022-10-17 PROCEDURE — 36590 REMOVAL TUNNELED CV CATH: CPT | Mod: RT | Performed by: RADIOLOGY

## 2022-10-17 PROCEDURE — 85027 COMPLETE CBC AUTOMATED: CPT | Performed by: PATHOLOGY

## 2022-10-17 PROCEDURE — 36590 REMOVAL TUNNELED CV CATH: CPT | Mod: RT

## 2022-10-17 RX ORDER — BUPIVACAINE HYDROCHLORIDE 2.5 MG/ML
INJECTION, SOLUTION EPIDURAL; INFILTRATION; INTRACAUDAL PRN
Status: DISCONTINUED | OUTPATIENT
Start: 2022-10-17 | End: 2022-10-17 | Stop reason: HOSPADM

## 2022-10-17 RX ORDER — ACETAMINOPHEN 325 MG/1
975 TABLET ORAL ONCE
Status: COMPLETED | OUTPATIENT
Start: 2022-10-17 | End: 2022-10-17

## 2022-10-17 RX ORDER — LIDOCAINE HYDROCHLORIDE 10 MG/ML
INJECTION, SOLUTION EPIDURAL; INFILTRATION; INTRACAUDAL; PERINEURAL PRN
Status: DISCONTINUED | OUTPATIENT
Start: 2022-10-17 | End: 2022-10-17 | Stop reason: HOSPADM

## 2022-10-17 RX ADMIN — ACETAMINOPHEN 975 MG: 325 TABLET ORAL at 10:53

## 2022-10-17 NOTE — H&P
Patient presents for port removal. Port placed under local anesthesia only. Patient requested port removal under local anesthesia only.    No interval change to H&P.    Consent obtained.

## 2022-10-17 NOTE — BRIEF OP NOTE
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  Complications: None.  Implants:   Implant Name Type Inv. Item Serial No.  Lot No. LRB No. Used Action   CATH PORT POWERPORT CLEARVUE SLIM 6FR [id number] - [id number] Port CATH PORT POWERPORT CLEARVUE SLIM 6FR [id number]  CR [employer] [id number] N/A 1 Explanted       6 Setswana single lumen 21.5 cm BD Bard PowerPort ClearVUE Slim removed completely. Pocket liberally irrigated with sterile saline and dried. Incision closed primarily.

## 2022-10-17 NOTE — DISCHARGE INSTRUCTIONS
A collaboration between [facility] and [facility]  Experts in minimally invasive, targeted treatments performed using imaging guidance    Venous Access Device, Port Catheter or Tunneled Central Line Removal    Today you had your existing venous access device removed, either because it was no longer needed or because there was malfunction or infection issues.    After you go home:  Drink plenty of fluids.  Generally 6-8 (8 ounce) glasses a day is recommended.  Resume your regular diet unless otherwise ordered by a medical provider.  Keep any applied tape/gauze dressings clean and dry.  Change tape/gauze dressings if they get wet or soiled.  You may shower the following day after procedure, however cover and protect from moisture any tape/gauze dressings.  You may let water hit and run over dried skin glue, but do not scrub.  Pat the area dry after showering.  Port removal incisions are closed with absorbable suture, meaning they do not need to be removed at a later date, and a topical skin adhesive (skin glue).  This glue will wear off in 7-14 days.  Do not remove before this time.  If 14 days have passed and residual glue is present, you may gently remove it.  You may remove tape/gauze dressings after 5 days if the site looks closed and in the process of healing.  Do not apply gels, lotions, or ointments to the glue site for the first 10 days as this may cause the glue to prematurely soften and fail.  Do not perform strenuous activities or lift greater than 10 pounds for the next three days.  If there is bleeding or oozing from the procedure site, apply firm pressure to the area for 5-10 minutes.  If the bleeding continues seek medical advice at the numbers below.  Mild procedure site discomfort can be treated with an ice pack and over-the-counter pain relievers.              .    Call our Interventional Radiology (IR) service if:  If you start bleeding from the  procedure site.  If you do start to bleed from the site, lie down and hold some pressure on the site.  Our radiology provider can help you decide if you need to return to the hospital.  If you have new or worsening pain related to the procedure.  If you have concerning swelling at the procedure site.  If you develop persistent nausea or vomiting.  If you develop hives or a rash or any unexplained itching.  If you have a fever of greater than 100.5  F and chills in the first 5 days after procedure.  Any other concerns related to your procedure.      [facility]  Interventional Radiology (IR)  [address]  [address] [facility] [place] [address]    Contact Number:  [phone]  (IR control desk)  Monday - Friday 8:00 am - 4:30 pm    After hours for urgent concerns:  [phone]  After 4:30 pm Monday - Friday, Weekends and Holidays.   Ask for Interventional Radiology on-call.  Someone is available 24 hours a day.  [facility] toll free number:  [phone]    Tylenol 975 mg given at 1050 am.  Next available dose at 450 pm.

## 2022-10-19 NOTE — RESULT ENCOUNTER NOTE
Team - I know this patient does not want to do any more IV chemotherapy and has her Chemo-Port removed but please reach out to her and ask her if she is interested in doing Caris testing on her previous biopsy in case there is a mutation in the cancer cells that would qualify her for an oral chemotherapy agent?

## 2022-10-26 ENCOUNTER — PATIENT OUTREACH (OUTPATIENT)
Dept: ONCOLOGY | Facility: CLINIC | Age: 72
End: 2022-10-26

## 2022-10-26 NOTE — PROGRESS NOTES
[facility]: Cancer Care                                                                                          Received call from Jessica.  She was wondering about wig resources.  Her hair has continued to fall out since stopping chemo.  This is very concerning to her and she would like to check into wig options.  Her insurance will assist in paying for the wig.     She is going to follow up with an ophthalmologist that evaluated her in the past.  She will give the physician my direct number if she would like to connect with Dr. Dobson at some point.     Jessica also had questions on repeat imaging and whether she should follow up with [facility] or her PCP.  Let her know she could follow up with her PCP and they could order repeat imaging.   PCP is also welcome to reach out to Dr. Dobson at any time with questions.     Jessica will call with questions.    Signature:  Bridgette Brian RNCC

## 2022-10-28 DIAGNOSIS — L65.9 ALOPECIA: Primary | ICD-10-CM

## 2022-10-31 NOTE — PROGRESS NOTES
Mailed order for cranial prosthesis and list of wig resources to Jessica's home address per her request.     Bridgette Brian RNCC

## 2022-11-09 NOTE — TUMOR CONFERENCE
Tumor Conference Information  Tumor Conference: ENT  Specialties Present: Medical oncology, Radiation oncology, Pathology, Radiology, Surgery  Patient Status: Prospective  Stage: n/a  Treatment to Date: Biopsy  Clinical Trials: Not discussed  Genetic Testing Discussed/Recommended?: No  Supportive Care Services Discussed/Recommended?: Yes  Recommended Plan: Follows evidence-based guidelines  Did the review exceed 30 minutes?: did not           Documentation / Disclaimer Cancer Tumor Board Note  Cancer tumor board recommendations do not override what is determined to be reasonable care and treatment, which is dependent on the circumstances of a patient's case; the patient's medical, social, and personal concerns; and the clinical judgment of the oncologist [physician].

## 2022-12-12 ENCOUNTER — TELEPHONE (OUTPATIENT)
Dept: ONCOLOGY | Facility: CLINIC | Age: 72
End: 2022-12-12

## 2022-12-12 NOTE — TELEPHONE ENCOUNTER
"Nurse Triage SBAR    Situation: pink around site where port was removed    Background:    DX: sebaceous cell carcinoma of the eyelid  Provider: Dr. Dobson, Bridgette Brian, DILAN  Most recent treatment: Port removed on 10/17.   Most recent appointment: 9/15/22 with CLARE Marin  Upcoming appointments: Not yet scheduled    Assessment:   Port removed October 17th. \"Plastic thing\" (absorbable suture?)came off two weeks ago. She was advised to leave this on until it fell off, but since it has come off, skin has become pink around the incision and the area is sensitive to clothing touching the area. No s/sx of infection, no fever or chills, no purulent discharge. Area is clean and dry. Pt is wondering if this is normal and what she can do about it.     Recommendation:   Advised pt to wear loose clothing to avoid rubbing.  Advised pt place bandage over area during the day to avoid rubbing and remove bandage at night.  Will route to Care team for their recommendations.     Routed to CLARE Marin and DILAN Rocha; cc'd triage for follow up.    "

## 2022-12-13 NOTE — TELEPHONE ENCOUNTER
12/12/22 Per Katy Obregon PA-C, wondering if pt can send a picture of the port site    1421 This writer called Jessica to see if could e-mail a picture of the port site [email or web address]    Denies fever, chills, fluids, swelling or puffiness to site.     Instructed patient to seek care immediately for worsening symptoms, including: fever, chest pain, shortness of breath, dizziness.

## 2022-12-14 NOTE — TELEPHONE ENCOUNTER
0928 Per Katy Obregon PA-C, reviewed pictures of port site. Appearance of pictures show no additional concerns at port site.     0934 made two attempts to update pt and [facility].     1112  This writer updated Jessica who verbalized understanding.

## 2023-01-18 DIAGNOSIS — C44.131 SEBACEOUS CELL CARCINOMA OF SKIN OF EYELID, INCLUDING CANTHUS: Primary | ICD-10-CM

## 2023-01-18 DIAGNOSIS — C43.121: ICD-10-CM

## 2023-01-18 DIAGNOSIS — C76.0 HEAD AND NECK CANCER (H): ICD-10-CM

## 2023-02-01 ENCOUNTER — PATIENT OUTREACH (OUTPATIENT)
Dept: ONCOLOGY | Facility: CLINIC | Age: 73
End: 2023-02-01
Payer: COMMERCIAL

## 2023-02-01 ENCOUNTER — TELEPHONE (OUTPATIENT)
Dept: FAMILY MEDICINE | Facility: CLINIC | Age: 73
End: 2023-02-01
Payer: COMMERCIAL

## 2023-02-01 NOTE — TELEPHONE ENCOUNTER
Dr. Valdez from 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  Dr. Valdez's clinic will fax the OV and provider notes. They will include a cell phone # for Dr. Valdez.    Pt's hem/omc, Dr. Chilel, no longer sees pt. He won't order this PET scan.    PT will be going to [facility]. [place] needs the PET scan ordered by PCP. Then pt will be f/u at [place].    Would Nayely Law order this? Seen on 9/30/22.  [facility] will fax the office notes on 2/1/23.    ANDRIY Rawls

## 2023-02-01 NOTE — PROGRESS NOTES
RAHUL Marleye in triage that Dr. Dobson is not going to be ordering the PET scan since Jessica is no longer under his care at this time.   Provided my direct number to call back with questions.     Bridgette Brian RNCC

## 2023-02-02 NOTE — TELEPHONE ENCOUNTER
Response from onc provider:  ---  Thanks for the message. This question was previously addressed with Dr. Dobson who did not order the PET scan as the patient is no longer following in our clinic. Our recommendation was to obtain the PET scan at [place] if she is pursuing care there.   Katy   ---  KAYLAH Callahan RN  [facility]

## 2023-02-02 NOTE — TELEPHONE ENCOUNTER
She should really be following with a new oncologist.  I am unsure which PET they would want and do not usually follow up on these tests.  Looks like she saw Beatriz Obregon PA-C in cancer clinic, would recommend reaching out to her for the order.

## 2023-02-09 NOTE — TELEPHONE ENCOUNTER
Spoke with patient and advised of below.  Pt states she is already has PET process started with Gee Callahan RN  [facility]

## 2023-02-27 ENCOUNTER — HOSPITAL ENCOUNTER (OUTPATIENT)
Dept: PET IMAGING | Facility: HOSPITAL | Age: 73
Discharge: HOME | End: 2023-02-27
Attending: STUDENT IN AN ORGANIZED HEALTH CARE EDUCATION/TRAINING PROGRAM | Admitting: STUDENT IN AN ORGANIZED HEALTH CARE EDUCATION/TRAINING PROGRAM
Payer: COMMERCIAL

## 2023-02-27 DIAGNOSIS — C44.131 SEBACEOUS CELL CARCINOMA OF SKIN OF EYELID, INCLUDING CANTHUS: ICD-10-CM

## 2023-02-27 LAB — GLUCOSE BLDC GLUCOMTR-MCNC: 95 MG/DL (ref 70–99)

## 2023-02-27 PROCEDURE — 82962 GLUCOSE BLOOD TEST: CPT

## 2023-02-27 PROCEDURE — 78815 PET IMAGE W/CT SKULL-THIGH: CPT | Mod: PS

## 2023-02-27 PROCEDURE — A9552 F18 FDG: HCPCS | Performed by: STUDENT IN AN ORGANIZED HEALTH CARE EDUCATION/TRAINING PROGRAM

## 2023-02-27 PROCEDURE — 343N000001 HC RX 343: Performed by: STUDENT IN AN ORGANIZED HEALTH CARE EDUCATION/TRAINING PROGRAM

## 2023-02-27 RX ADMIN — FLUDEOXYGLUCOSE F-18 10.13 MILLICURIE: 500 INJECTION, SOLUTION INTRAVENOUS at 11:34

## 2023-04-24 DIAGNOSIS — I10 BENIGN ESSENTIAL HTN: ICD-10-CM

## 2023-04-25 RX ORDER — AMLODIPINE BESYLATE 5 MG/1
TABLET ORAL
Qty: 90 TABLET | Refills: 1 | Status: SHIPPED | OUTPATIENT
Start: 2023-04-25 | End: 2023-10-26

## 2023-04-25 NOTE — TELEPHONE ENCOUNTER
9/3/22 was last OV.  Prescription approved per [facility] Refill Protocol.  ANDRIY Rawls

## 2023-10-24 ENCOUNTER — TELEPHONE (OUTPATIENT)
Dept: FAMILY MEDICINE | Facility: CLINIC | Age: 73
End: 2023-10-24
Payer: COMMERCIAL

## 2023-10-24 DIAGNOSIS — I10 BENIGN ESSENTIAL HTN: ICD-10-CM

## 2023-10-24 NOTE — TELEPHONE ENCOUNTER
Patient Returning Call    Reason for call:  Patient has one week of BP meds left (9 pills) and would like to get enoguh to get thru to appt upcoming     Information relayed to patient:  Message sent     Patient has additional questions:  No    What are your questions/concerns:  None     Okay to leave a detailed message?: Yes at Home number on file [phone] (home)

## 2023-10-26 RX ORDER — AMLODIPINE BESYLATE 5 MG/1
5 TABLET ORAL DAILY
Qty: 30 TABLET | Refills: 0 | Status: SHIPPED | OUTPATIENT
Start: 2023-10-26 | End: 2023-12-01

## 2023-12-01 ENCOUNTER — OFFICE VISIT (OUTPATIENT)
Dept: FAMILY MEDICINE | Facility: CLINIC | Age: 73
End: 2023-12-01
Payer: COMMERCIAL

## 2023-12-01 VITALS
DIASTOLIC BLOOD PRESSURE: 70 MMHG | HEART RATE: 74 BPM | BODY MASS INDEX: 25.98 KG/M2 | SYSTOLIC BLOOD PRESSURE: 120 MMHG | HEIGHT: 61 IN | TEMPERATURE: 97.7 F | WEIGHT: 137.6 LBS | RESPIRATION RATE: 18 BRPM | OXYGEN SATURATION: 98 %

## 2023-12-01 DIAGNOSIS — M25.551 HIP PAIN, BILATERAL: ICD-10-CM

## 2023-12-01 DIAGNOSIS — F51.04 PSYCHOPHYSIOLOGICAL INSOMNIA: ICD-10-CM

## 2023-12-01 DIAGNOSIS — Z01.818 PREOP GENERAL PHYSICAL EXAM: Primary | ICD-10-CM

## 2023-12-01 DIAGNOSIS — I10 BENIGN ESSENTIAL HTN: ICD-10-CM

## 2023-12-01 DIAGNOSIS — M25.552 HIP PAIN, BILATERAL: ICD-10-CM

## 2023-12-01 DIAGNOSIS — K64.4 EXTERNAL HEMORRHOIDS: ICD-10-CM

## 2023-12-01 DIAGNOSIS — C76.0 MALIGNANT NEOPLASM OF HEAD, FACE, AND NECK (H): ICD-10-CM

## 2023-12-01 DIAGNOSIS — H25.89 OTHER AGE-RELATED CATARACT OF RIGHT EYE: ICD-10-CM

## 2023-12-01 DIAGNOSIS — Z23 NEED FOR COVID-19 VACCINE: ICD-10-CM

## 2023-12-01 DIAGNOSIS — Z23 NEED FOR INFLUENZA VACCINATION: ICD-10-CM

## 2023-12-01 PROCEDURE — 90480 ADMN SARSCOV2 VAC 1/ONLY CMP: CPT | Performed by: NURSE PRACTITIONER

## 2023-12-01 PROCEDURE — 91320 SARSCV2 VAC 30MCG TRS-SUC IM: CPT | Performed by: NURSE PRACTITIONER

## 2023-12-01 PROCEDURE — 90662 IIV NO PRSV INCREASED AG IM: CPT | Performed by: NURSE PRACTITIONER

## 2023-12-01 PROCEDURE — 99214 OFFICE O/P EST MOD 30 MIN: CPT | Mod: 25 | Performed by: NURSE PRACTITIONER

## 2023-12-01 PROCEDURE — G0008 ADMIN INFLUENZA VIRUS VAC: HCPCS | Performed by: NURSE PRACTITIONER

## 2023-12-01 RX ORDER — RESPIRATORY SYNCYTIAL VIRUS VACCINE 120MCG/0.5
0.5 KIT INTRAMUSCULAR ONCE
Qty: 1 EACH | Refills: 0 | Status: CANCELLED | OUTPATIENT
Start: 2023-12-01 | End: 2023-12-01

## 2023-12-01 RX ORDER — HYDROCORTISONE 25 MG/G
CREAM TOPICAL 2 TIMES DAILY PRN
Qty: 30 G | Refills: 0 | Status: SHIPPED | OUTPATIENT
Start: 2023-12-01 | End: 2024-05-28

## 2023-12-01 RX ORDER — HYDROCORTISONE 25 MG/G
CREAM TOPICAL 2 TIMES DAILY PRN
Qty: 30 G | Refills: 0 | Status: CANCELLED | OUTPATIENT
Start: 2023-12-01

## 2023-12-01 RX ORDER — HYDROXYZINE HYDROCHLORIDE 25 MG/1
25 TABLET, FILM COATED ORAL 3 TIMES DAILY PRN
Qty: 60 TABLET | Refills: 1 | Status: SHIPPED | OUTPATIENT
Start: 2023-12-01

## 2023-12-01 RX ORDER — AMLODIPINE BESYLATE 5 MG/1
5 TABLET ORAL DAILY
Qty: 30 TABLET | Refills: 0 | Status: SHIPPED | OUTPATIENT
Start: 2023-12-01 | End: 2024-01-03

## 2023-12-01 NOTE — LETTER
12/1/2023        RE: Jessica Zamorano  [address]  [facility] [address]        [place] [address] [facility]  [address]  [place] [address]  Phone: [phone]  Fax: [phone]  Primary Provider: Nayely Law  Pre-op Performing Provider: NAYELY LAW      PREOPERATIVE EVALUATION:  Today's date: 12/1/2023    Jessica is a 73 year old, presenting for the following:  Pre-Op Exam        12/1/2023     1:51 PM   Additional Questions   Roomed by Kash   Accompanied by self       Surgical Information:  Surgery/Procedure: Cataract surgery on right  Surgery Location: [facility]  Surgeon: Kristin Mcneil  Surgery Date: 12/11/2023  Time of Surgery: TBD  Where patient plans to recover: At home alone. Friends will check up on   Fax number for surgical facility: [phone]    Assessment & Plan    The proposed surgical procedure is considered LOW risk.    (Z01.818) Preop general physical exam  (primary encounter diagnosis)  Comment: Patient is scheduled on 12/11 for a R eye cataract. She is cleared for surgery.   Plan:     (H25.89) Other age-related cataract of right eye  Comment: Surgery on 12/11.  Plan:     (I10) Benign essential HTN  Comment: Well controlled on 5mg Amlodipine. Refills sent.   BP Readings from Last 3 Encounters:   12/01/23 120/70   10/17/22 122/58   09/30/22 138/72   Plan: amLODIPine (NORVASC) 5 MG tablet, PRIMARY CARE         FOLLOW-UP SCHEDULING            (F51.04) Psychophysiological insomnia  Comment: Patient reports she has a hard time falling asleep occasionally. She was prescribed Ativan during chemotherapy and has been using this for sleep. Discussed the risks of Ativan with patient given her age and low BP, she is in agreement to try Atarax for sleep instead.   Plan: hydrOXYzine (ATARAX) 25 MG tablet            (K64.4) External hemorrhoids  Comment: Well controlled, uses hydrocortisone PRN.  Plan: hydrocortisone, Perianal,  (HYDROCORTISONE) 2.5         % cream            (M25.551,  M25.552) Hip pain, bilateral  Comment: Likely has osteoarthritis of hips. Uses Tylenol PRN at home. Will also try Voltaren. Patient will return for physical in January - she will decide if she is interested in PT by that time.   Plan: diclofenac (VOLTAREN) 1 % topical gel, PRIMARY         CARE FOLLOW-UP SCHEDULING            (Z23) Need for influenza vaccination  Comment: Given today in clinic  Plan: INFLUENZA VACCINE 65+ (FLUZONE HD)            (Z23) Need for COVID-19 vaccine  Comment: Given today in clinic  Plan: COVID-19 12+ (2023-24) (PFIZER)            (C76.0) Malignant neoplasm of head, face, and neck (H)  Comment: Patient is followed by [facility] for this. 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 This has been understandably hard for her to cope with and she is seeing a therapist.  Plan:             - No identified additional risk factors other than previously addressed    Antiplatelet or Anticoagulation Medication Instructions:   - Patient is on no antiplatelet or anticoagulation medications.    Additional Medication Instructions:   - Calcium Channel Blockers: May be continued on the day of surgery.    RECOMMENDATION:  APPROVAL GIVEN to proceed with proposed procedure, without further diagnostic evaluation.            Subjective      HPI related to upcoming procedure: Pt is here today for a pre op prior to R eye cataract surgery on 12/11. Patient has had no acute changes to her health status and is doing well.         12/1/2023     1:49 PM   Preop Questions   1. Have you ever had a heart attack or stroke? No   2. Have you ever had surgery on your heart or blood vessels, such as a stent placement, a coronary artery bypass, or surgery on an artery in your head, neck, heart, or legs? No   3. Do you have chest pain with activity? YES - no cardiac risk factors, low cardiac risk surgery   4. Do you have a history of   heart failure? No   5. Do you currently have a cold, bronchitis or symptoms of other infection? No   6. Do you have a cough, shortness of breath, or wheezing? No   7. Do you or anyone in your family have previous history of blood clots? No   8. Do you or does anyone in your family have a serious bleeding problem such as prolonged bleeding following surgeries or cuts? No   9. Have you ever had problems with anemia or been told to take iron pills? No   10. Have you had any abnormal blood loss such as black, tarry or bloody stools, or abnormal vaginal bleeding? No   11. Have you ever had a blood transfusion? No   12. Are you willing to have a blood transfusion if it is medically needed before, during, or after your surgery? Yes   13. Have you or any of your relatives ever had problems with anesthesia? No   14. Do you have sleep apnea, excessive snoring or daytime drowsiness? No   15. Do you have any artifical heart valves or other implanted medical devices like a pacemaker, defibrillator, or continuous glucose monitor? No   16. Do you have artificial joints? No   17. Are you allergic to latex? No       Health Care Directive:  Patient does not have a Health Care Directive or Living Will: Discussed advance care planning with patient; however, patient declined at this time.    Preoperative Review of :   reviewed - no record of controlled substances prescribed.          Review of Systems  CONSTITUTIONAL: NEGATIVE for fever, chills, change in weight  INTEGUMENTARY/SKIN: NEGATIVE for worrisome rashes, moles or lesions  EYES: NEGATIVE for vision changes or irritation  ENT/MOUTH: NEGATIVE for ear, mouth and throat problems  RESP: NEGATIVE for significant cough or SOB  CV: NEGATIVE for chest pain, palpitations or peripheral edema  GI: NEGATIVE for nausea, abdominal pain, heartburn, or change in bowel habits  : NEGATIVE for frequency, dysuria, or hematuria  MUSCULOSKELETAL: NEGATIVE for significant arthralgias or  myalgia  NEURO: NEGATIVE for weakness, dizziness or paresthesias  ENDOCRINE: NEGATIVE for temperature intolerance, skin/hair changes  HEME: NEGATIVE for bleeding problems  PSYCHIATRIC: NEGATIVE for changes in mood or affect    Patient Active Problem List    Diagnosis Date Noted     Malignant neoplasm of head, face, and neck (H) 08/16/2022     Priority: Medium     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, including canthus (limited Caris testing performed due to small quantity of tumor tissue which was negative for actionable genomic tumor changes) 07/12/2022     Priority: Medium      Past Medical History:   Diagnosis Date     Hypertension      Past Surgical History:   Procedure Laterality Date     INSERT PORT VASCULAR ACCESS Right 8/12/2022    Procedure: INSERTION, VASCULAR ACCESS PORT SINGLE LUMEN;  Surgeon: Toñito Kolb MD;  [place] [facility]     IR CHEST PORT PLACEMENT > 5 YRS OF AGE  8/12/2022     IR PORT REMOVAL RIGHT  10/17/2022     REMOVE PORT VASCULAR ACCESS Right 10/17/2022    Procedure: REMOVAL, VASCULAR ACCESS PORT RIGHT;  Surgeon: Toñito Kolb MD;  [place] [facility] [place]     Current Outpatient Medications   Medication Sig Dispense Refill     amLODIPine (NORVASC) 5 MG tablet Take 1 tablet (5 mg) by mouth daily 30 tablet 0     hydrocortisone, Perianal, (HYDROCORTISONE) 2.5 % cream Place rectally 2 times daily as needed for hemorrhoids 30 g 0       Allergies   Allergen Reactions     Hyaluronan Other (See Comments)     Stinging in eye      Prednisolone Other (See Comments)     Extreme dryness in eye  Extreme dryness in eye  Extreme dryness in eye  Extreme dryness in eye       Sodium Hyaluronate (Tony) Other (See Comments)     Stinging in eye      Dexamethasone Rash     Metronidazole Rash     Povidone Iodine Rash     Tobramycin Rash        Social History     Tobacco Use     Smoking status: Never     Smokeless tobacco: Never   Substance Use Topics     Alcohol use: Not Currently       History   Drug  "Use Unknown         Objective    /70 (BP Location: Right arm, Patient Position: Sitting, Cuff Size: Adult Regular)   Pulse 74   Temp 97.7  F (36.5  C) (Temporal)   Resp 18   Ht 1.547 m (5' 0.91\")   Wt 62.4 kg (137 lb 9.6 oz)   SpO2 98%   BMI 26.08 kg/m      Physical Exam    GENERAL APPEARANCE: healthy, alert and no distress     EYES: EOMI, PERRL     HENT: ear canals and TM's normal and nose and mouth without ulcers or lesions     NECK: no adenopathy, no asymmetry, masses, or scars and thyroid normal to palpation     RESP: lungs clear to auscultation - no rales, rhonchi or wheezes     CV: regular rates and rhythm, normal S1 S2, no S3 or S4 and no murmur, click or rub     ABDOMEN:  soft, nontender, no HSM or masses and bowel sounds normal     MS: extremities normal- no gross deformities noted, no evidence of inflammation in joints, FROM in all extremities.     SKIN: no suspicious lesions or rashes     NEURO: Normal strength and tone, sensory exam grossly normal, mentation intact and speech normal     PSYCH: mentation appears normal. and affect normal/bright     LYMPHATICS: No cervical adenopathy    Recent Labs   Lab Test 10/17/22  1041 09/15/22  1225 09/09/22  1006 08/28/22  1521   HGB 11.1* 13.8 13.4 12.3    352 490* 108*   NA  --   --  140 135   POTASSIUM  --   --  4.0 3.3*   CR  --   --  0.85 0.83        Diagnostics:  No labs were ordered during this visit.   No EKG required for low risk surgery (cataract, skin procedure, breast biopsy, etc).  No EKG required, no history of coronary heart disease, significant arrhythmia, peripheral arterial disease or other structural heart disease.    Revised Cardiac Risk Index (RCRI):  The patient has the following serious cardiovascular risks for perioperative complications:   - No serious cardiac risks = 0 points     RCRI Interpretation: 0 points: Class I (very low risk - 0.4% complication rate)         Rosaura Ferreira, Student Nurse Practitioner "   Physician Attestation  I, KWAME Calles CNP, was present with the medical/MIKIE student who participated in the service and in the documentation of the note.  I have verified the history and personally performed the physical exam and medical decision making.  I agree with the assessment and plan of care as documented in the note.        KWAME Calles CNP    Signed Electronically by: KWAME Calles CNP  Copy of this evaluation report is provided to requesting physician.        Prior to immunization administration, verified patients identity using patient s name and date of birth. Please see Immunization Activity for additional information.     Screening Questionnaire for Adult Immunization    Are you sick today?   No   Do you have allergies to medications, food, a vaccine component or latex?   No, sensitivity to latex   Have you ever had a serious reaction after receiving a vaccination?   No   Do you have a long-term health problem with heart, lung, kidney, or metabolic disease (e.g., diabetes), asthma, a blood disorder, no spleen, complement component deficiency, a cochlear implant, or a spinal fluid leak?  Are you on long-term aspirin therapy?   No   Do you have cancer, leukemia, HIV/AIDS, or any other immune system problem?   No   Do you have a parent, brother, or sister with an immune system problem?   No   In the past 3 months, have you taken medications that affect  your immune system, such as prednisone, other steroids, or anticancer drugs; drugs for the treatment of rheumatoid arthritis, Crohn s disease, or psoriasis; or have you had radiation treatments?   No   Have you had a seizure, or a brain or other nervous system problem?   No   During the past year, have you received a transfusion of blood or blood    products, or been given immune (gamma) globulin or antiviral drug?   No   For women: Are you pregnant or is there a chance you could become       pregnant during the next month?   No    Have you received any vaccinations in the past 4 weeks?   No     Immunization questionnaire answers were all negative.      Patient instructed to remain in clinic for 15 minutes afterwards, and to report any adverse reactions.     Screening performed by Kash Bergman RN on 12/1/2023 at 2:34 PM.           Sincerely,        KWAME Calles CNP

## 2023-12-01 NOTE — PROGRESS NOTES
[facility]  [address] [place] [address]  [address]  [place] [address]  Phone: [phone]  Fax: [phone]  Primary Provider: Nayely Law  Pre-op Performing Provider: NAYELY LAW      PREOPERATIVE EVALUATION:  Today's date: 12/1/2023    Jessica is a 73 year old, presenting for the following:  Pre-Op Exam        12/1/2023     1:51 PM   Additional Questions   Roomed by Kash   Accompanied by self       Surgical Information:  Surgery/Procedure: Cataract surgery on right  Surgery Location: [facility]  Surgeon: Kristin Mcneil  Surgery Date: 12/11/2023  Time of Surgery: TBD  Where patient plans to recover: At home alone. Friends will check up on   Fax number for surgical facility: [phone]    Assessment & Plan     The proposed surgical procedure is considered LOW risk.    (Z01.818) Preop general physical exam  (primary encounter diagnosis)  Comment: Patient is scheduled on 12/11 for a R eye cataract. She is cleared for surgery.   Plan:     (H25.89) Other age-related cataract of right eye  Comment: Surgery on 12/11.  Plan:     (I10) Benign essential HTN  Comment: Well controlled on 5mg Amlodipine. Refills sent.   BP Readings from Last 3 Encounters:   12/01/23 120/70   10/17/22 122/58   09/30/22 138/72   Plan: amLODIPine (NORVASC) 5 MG tablet, PRIMARY CARE         FOLLOW-UP SCHEDULING            (F51.04) Psychophysiological insomnia  Comment: Patient reports she has a hard time falling asleep occasionally. She was prescribed Ativan during chemotherapy and has been using this for sleep. Discussed the risks of Ativan with patient given her age and low BP, she is in agreement to try Atarax for sleep instead.   Plan: hydrOXYzine (ATARAX) 25 MG tablet            (K64.4) External hemorrhoids  Comment: Well controlled, uses hydrocortisone PRN.  Plan: hydrocortisone, Perianal, (HYDROCORTISONE) 2.5         % cream            (M25.551,  M25.552) Hip pain, bilateral  Comment: Likely  has osteoarthritis of hips. Uses Tylenol PRN at home. Will also try Voltaren. Patient will return for physical in January - she will decide if she is interested in PT by that time.   Plan: diclofenac (VOLTAREN) 1 % topical gel, PRIMARY         CARE FOLLOW-UP SCHEDULING            (Z23) Need for influenza vaccination  Comment: Given today in clinic  Plan: INFLUENZA VACCINE 65+ (FLUZONE HD)            (Z23) Need for COVID-19 vaccine  Comment: Given today in clinic  Plan: COVID-19 12+ (2023-24) (PFIZER)            (C76.0) Malignant neoplasm of head, face, and neck (H)  Comment: Patient is followed by [facility] for this. 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 This has been understandably hard for her to cope with and she is seeing a therapist.  Plan:             - No identified additional risk factors other than previously addressed    Antiplatelet or Anticoagulation Medication Instructions:   - Patient is on no antiplatelet or anticoagulation medications.    Additional Medication Instructions:   - Calcium Channel Blockers: May be continued on the day of surgery.    RECOMMENDATION:  APPROVAL GIVEN to proceed with proposed procedure, without further diagnostic evaluation.            Subjective       HPI related to upcoming procedure: Pt is here today for a pre op prior to R eye cataract surgery on 12/11. Patient has had no acute changes to her health status and is doing well.         12/1/2023     1:49 PM   Preop Questions   1. Have you ever had a heart attack or stroke? No   2. Have you ever had surgery on your heart or blood vessels, such as a stent placement, a coronary artery bypass, or surgery on an artery in your head, neck, heart, or legs? No   3. Do you have chest pain with activity? YES - no cardiac risk factors, low cardiac risk surgery   4. Do you have a history of  heart failure? No   5. Do you currently have a cold, bronchitis or symptoms of other infection? No   6. Do  you have a cough, shortness of breath, or wheezing? No   7. Do you or anyone in your family have previous history of blood clots? No   8. Do you or does anyone in your family have a serious bleeding problem such as prolonged bleeding following surgeries or cuts? No   9. Have you ever had problems with anemia or been told to take iron pills? No   10. Have you had any abnormal blood loss such as black, tarry or bloody stools, or abnormal vaginal bleeding? No   11. Have you ever had a blood transfusion? No   12. Are you willing to have a blood transfusion if it is medically needed before, during, or after your surgery? Yes   13. Have you or any of your relatives ever had problems with anesthesia? No   14. Do you have sleep apnea, excessive snoring or daytime drowsiness? No   15. Do you have any artifical heart valves or other implanted medical devices like a pacemaker, defibrillator, or continuous glucose monitor? No   16. Do you have artificial joints? No   17. Are you allergic to latex? No       Health Care Directive:  Patient does not have a Health Care Directive or Living Will: Discussed advance care planning with patient; however, patient declined at this time.    Preoperative Review of :   reviewed - no record of controlled substances prescribed.          Review of Systems  CONSTITUTIONAL: NEGATIVE for fever, chills, change in weight  INTEGUMENTARY/SKIN: NEGATIVE for worrisome rashes, moles or lesions  EYES: NEGATIVE for vision changes or irritation  ENT/MOUTH: NEGATIVE for ear, mouth and throat problems  RESP: NEGATIVE for significant cough or SOB  CV: NEGATIVE for chest pain, palpitations or peripheral edema  GI: NEGATIVE for nausea, abdominal pain, heartburn, or change in bowel habits  : NEGATIVE for frequency, dysuria, or hematuria  MUSCULOSKELETAL: NEGATIVE for significant arthralgias or myalgia  NEURO: NEGATIVE for weakness, dizziness or paresthesias  ENDOCRINE: NEGATIVE for temperature intolerance,  skin/hair changes  HEME: NEGATIVE for bleeding problems  PSYCHIATRIC: NEGATIVE for changes in mood or affect    Patient Active Problem List    Diagnosis Date Noted    Malignant neoplasm of head, face, and neck (H) 08/16/2022     Priority: Medium    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, including canthus (limited Caris testing performed due to small quantity of tumor tissue which was negative for actionable genomic tumor changes) 07/12/2022     Priority: Medium      Past Medical History:   Diagnosis Date    Hypertension      Past Surgical History:   Procedure Laterality Date    INSERT PORT VASCULAR ACCESS Right 8/12/2022    Procedure: INSERTION, VASCULAR ACCESS PORT SINGLE LUMEN;  Surgeon: Toñito Kolb MD;  [place] [facility] [place]    IR CHEST PORT PLACEMENT > 5 YRS OF AGE  8/12/2022    IR PORT REMOVAL RIGHT  10/17/2022    REMOVE PORT VASCULAR ACCESS Right 10/17/2022    Procedure: REMOVAL, VASCULAR ACCESS PORT RIGHT;  Surgeon: Toñito Kolb MD;  Location: [facility] [place]     Current Outpatient Medications   Medication Sig Dispense Refill    amLODIPine (NORVASC) 5 MG tablet Take 1 tablet (5 mg) by mouth daily 30 tablet 0    hydrocortisone, Perianal, (HYDROCORTISONE) 2.5 % cream Place rectally 2 times daily as needed for hemorrhoids 30 g 0       Allergies   Allergen Reactions    Hyaluronan Other (See Comments)     Stinging in eye     Prednisolone Other (See Comments)     Extreme dryness in eye  Extreme dryness in eye  Extreme dryness in eye  Extreme dryness in eye      Sodium Hyaluronate (Tony) Other (See Comments)     Stinging in eye     Dexamethasone Rash    Metronidazole Rash    Povidone Iodine Rash    Tobramycin Rash        Social History     Tobacco Use    Smoking status: Never    Smokeless tobacco: Never   Substance Use Topics    Alcohol use: Not Currently       History   Drug Use Unknown         Objective     /70 (BP Location: Right arm, Patient Position: Sitting, Cuff Size: Adult Regular)   Pulse 74  "  Temp 97.7  F (36.5  C) (Temporal)   Resp 18   Ht 1.547 m (5' 0.91\")   Wt 62.4 kg (137 lb 9.6 oz)   SpO2 98%   BMI 26.08 kg/m      Physical Exam    GENERAL APPEARANCE: healthy, alert and no distress     EYES: EOMI, PERRL     HENT: ear canals and TM's normal and nose and mouth without ulcers or lesions     NECK: no adenopathy, no asymmetry, masses, or scars and thyroid normal to palpation     RESP: lungs clear to auscultation - no rales, rhonchi or wheezes     CV: regular rates and rhythm, normal S1 S2, no S3 or S4 and no murmur, click or rub     ABDOMEN:  soft, nontender, no HSM or masses and bowel sounds normal     MS: extremities normal- no gross deformities noted, no evidence of inflammation in joints, FROM in all extremities.     SKIN: no suspicious lesions or rashes     NEURO: Normal strength and tone, sensory exam grossly normal, mentation intact and speech normal     PSYCH: mentation appears normal. and affect normal/bright     LYMPHATICS: No cervical adenopathy    Recent Labs   Lab Test 10/17/22  1041 09/15/22  1225 09/09/22  1006 08/28/22  1521   HGB 11.1* 13.8 13.4 12.3    352 490* 108*   NA  --   --  140 135   POTASSIUM  --   --  4.0 3.3*   CR  --   --  0.85 0.83        Diagnostics:  No labs were ordered during this visit.   No EKG required for low risk surgery (cataract, skin procedure, breast biopsy, etc).  No EKG required, no history of coronary heart disease, significant arrhythmia, peripheral arterial disease or other structural heart disease.    Revised Cardiac Risk Index (RCRI):  The patient has the following serious cardiovascular risks for perioperative complications:   - No serious cardiac risks = 0 points     RCRI Interpretation: 0 points: Class I (very low risk - 0.4% complication rate)         Rosaura Ferreira, Student Nurse Practitioner   Physician Attestation   I, Nayely Law, APRN CNP, was present with the medical/MIKIE student who participated in the service and in the " documentation of the note.  I have verified the history and personally performed the physical exam and medical decision making.  I agree with the assessment and plan of care as documented in the note.        KWAME Calles CNP    Signed Electronically by: KWAME Calles CNP  Copy of this evaluation report is provided to requesting physician.

## 2023-12-01 NOTE — PATIENT INSTRUCTIONS
Preparing for Your Surgery  Getting started  A nurse will call you to review your health history and instructions. They will give you an arrival time based on your scheduled surgery time. Please be ready to share:  Your doctor's clinic name and phone number  Your medical, surgical, and anesthesia history  A list of allergies and sensitivities  A list of medicines, including herbal treatments and over-the-counter drugs  Whether the patient has a legal guardian (ask how to send us the papers in advance)  Please tell us if you're pregnant--or if there's any chance you might be pregnant. Some surgeries may injure a fetus (unborn baby), so they require a pregnancy test. Surgeries that are safe for a fetus don't always need a test, and you can choose whether to have one.   If you have a child who's having surgery, please ask for a copy of Preparing for Your Child's Surgery.    Preparing for surgery  Within 10 to 30 days of surgery: Have a pre-op exam (sometimes called an H&P, or History and Physical). This can be done at a clinic or pre-operative center.  If you're having a , you may not need this exam. Talk to your care team.  At your pre-op exam, talk to your care team about all medicines you take. If you need to stop any medicines before surgery, ask when to start taking them again.  We do this for your safety. Many medicines can make you bleed too much during surgery. Some change how well surgery (anesthesia) drugs work.  Call your insurance company to let them know you're having surgery. (If you don't have insurance, call [phone].)  Call your clinic if there's any change in your health. This includes signs of a cold or flu (sore throat, runny nose, cough, rash, fever). It also includes a scrape or scratch near the surgery site.  If you have questions on the day of surgery, call your hospital or surgery center.  Eating and drinking guidelines  For your safety: Unless your surgeon tells you otherwise,  follow the guidelines below.  Eat and drink as usual until 8 hours before you arrive for surgery. After that, no food or milk.  Drink clear liquids until 2 hours before you arrive. These are liquids you can see through, like water, Gatorade, and Propel Water. They also include plain black coffee and tea (no cream or milk), candy, and breath mints. You can spit out gum when you arrive.  If you drink alcohol: Stop drinking it the night before surgery.  If your care team tells you to take medicine on the morning of surgery, it's okay to take it with a sip of water.  Preventing infection  Shower or bathe the night before and morning of your surgery. Follow the instructions your clinic gave you. (If no instructions, use regular soap.)  Don't shave or clip hair near your surgery site. We'll remove the hair if needed.  Don't smoke or vape the morning of surgery. You may chew nicotine gum up to 2 hours before surgery. A nicotine patch is okay.  Note: Some surgeries require you to completely quit smoking and nicotine. Check with your surgeon.  Your care team will make every effort to keep you safe from infection. We will:  Clean our hands often with soap and water (or an alcohol-based hand rub).  Clean the skin at your surgery site with a special soap that kills germs.  Give you a special gown to keep you warm. (Cold raises the risk of infection.)  Wear special hair covers, masks, gowns and gloves during surgery.  Give antibiotic medicine, if prescribed. Not all surgeries need antibiotics.  What to bring on the day of surgery  Photo ID and insurance card  Copy of your health care directive, if you have one  Glasses and hearing aids (bring cases)  You can't wear contacts during surgery  Inhaler and eye drops, if you use them (tell us about these when you arrive)  CPAP machine or breathing device, if you use them  A few personal items, if spending the night  If you have . . .  A pacemaker, ICD (cardiac defibrillator) or other  implant: Bring the ID card.  An implanted stimulator: Bring the remote control.  A legal guardian: Bring a copy of the certified (court-stamped) guardianship papers.  Please remove any jewelry, including body piercings. Leave jewelry and other valuables at home.  If you're going home the day of surgery  You must have a responsible adult drive you home. They should stay with you overnight as well.  If you don't have someone to stay with you, and you aren't safe to go home alone, we may keep you overnight. Insurance often won't pay for this.  After surgery  If it's hard to control your pain or you need more pain medicine, please call your surgeon's office.  Questions?   If you have any questions for your care team, list them here: _________________________________________________________________________________________________________________________________________________________________________ ____________________________________ ____________________________________ ____________________________________  For informational purposes only. Not to replace the advice of your health care provider. Copyright   2003, 2019 [facility]. All rights reserved. Clinically reviewed by Marilyn Elam MD. SMART[employer] [id number] - REV 12/22.    How to Take Your Medication Before Surgery  - Take all of your medications before surgery as usual

## 2023-12-01 NOTE — PROGRESS NOTES
Prior to immunization administration, verified patients identity using patient s name and date of birth. Please see Immunization Activity for additional information.     Screening Questionnaire for Adult Immunization    Are you sick today?   No   Do you have allergies to medications, food, a vaccine component or latex?   No, sensitivity to latex   Have you ever had a serious reaction after receiving a vaccination?   No   Do you have a long-term health problem with heart, lung, kidney, or metabolic disease (e.g., diabetes), asthma, a blood disorder, no spleen, complement component deficiency, a cochlear implant, or a spinal fluid leak?  Are you on long-term aspirin therapy?   No   Do you have cancer, leukemia, HIV/AIDS, or any other immune system problem?   No   Do you have a parent, brother, or sister with an immune system problem?   No   In the past 3 months, have you taken medications that affect  your immune system, such as prednisone, other steroids, or anticancer drugs; drugs for the treatment of rheumatoid arthritis, Crohn s disease, or psoriasis; or have you had radiation treatments?   No   Have you had a seizure, or a brain or other nervous system problem?   No   During the past year, have you received a transfusion of blood or blood    products, or been given immune (gamma) globulin or antiviral drug?   No   For women: Are you pregnant or is there a chance you could become       pregnant during the next month?   No   Have you received any vaccinations in the past 4 weeks?   No     Immunization questionnaire answers were all negative.      Patient instructed to remain in clinic for 15 minutes afterwards, and to report any adverse reactions.     Screening performed by Kash Bergman RN on 12/1/2023 at 2:34 PM.

## 2023-12-05 ENCOUNTER — TELEPHONE (OUTPATIENT)
Dept: FAMILY MEDICINE | Facility: CLINIC | Age: 73
End: 2023-12-05
Payer: COMMERCIAL

## 2023-12-05 DIAGNOSIS — M25.552 HIP PAIN, BILATERAL: ICD-10-CM

## 2023-12-05 DIAGNOSIS — M25.551 HIP PAIN, BILATERAL: ICD-10-CM

## 2023-12-05 NOTE — TELEPHONE ENCOUNTER
Patient called to find out more information regarding Atarax (hydroxyzine).  Patient does not want to take this medication 3 times a day and the indication of the medication is not what patient was told from provider.  RN explained to patient medication can be taking up to 3 times a day on a as needed basis. If patient does not need to use this medication on a certain day or not as many on a given day, she does not need to. Medication served a couple of indications, for itching, anxiety and or sleep. Patient verbalized understood and thank RN for the clarification with no further question.    Review provider visit note below dated 12/1/2023 with patient    (F51.04) Psychophysiological insomnia  Comment: Patient reports she has a hard time falling asleep occasionally. She was prescribed Ativan during chemotherapy and has been using this for sleep. Discussed the risks of Ativan with patient given her age and low BP, she is in agreement to try Atarax for sleep instead.   Plan: hydrOXYzine (ATARAX) 25 MG tablet       CHRISTINE ReeseN, RN  [facility]

## 2024-01-03 ENCOUNTER — TELEPHONE (OUTPATIENT)
Dept: FAMILY MEDICINE | Facility: CLINIC | Age: 74
End: 2024-01-03
Payer: COMMERCIAL

## 2024-01-03 DIAGNOSIS — I10 BENIGN ESSENTIAL HTN: ICD-10-CM

## 2024-01-03 NOTE — TELEPHONE ENCOUNTER
Patient only has a few days left and wishes to have 90 day supply be sent instead of a 1 month supply. Also wanting to have it go through [employer] mail order  
48

## 2024-01-03 NOTE — TELEPHONE ENCOUNTER
Patient's cataract surgery was moved to 2/22/24 and was told from the surgery center that need the pre-op refaxed to the new location. Patient is going to have the surgery done at the [facility] in [place] with Amy Alatorre and the fax number is [phone]. Per patient they will take the pre-op with the new date

## 2024-01-04 RX ORDER — AMLODIPINE BESYLATE 5 MG/1
5 TABLET ORAL DAILY
Qty: 90 TABLET | Refills: 0 | Status: SHIPPED | OUTPATIENT
Start: 2024-01-04 | End: 2024-04-09

## 2024-04-09 DIAGNOSIS — I10 BENIGN ESSENTIAL HTN: ICD-10-CM

## 2024-04-09 NOTE — TELEPHONE ENCOUNTER
Patient only has 4 days left of medication and has appointment scheduled for 5/28/2024 will need refill to get her to this appointment

## 2024-04-15 RX ORDER — AMLODIPINE BESYLATE 5 MG/1
5 TABLET ORAL DAILY
Qty: 90 TABLET | Refills: 0 | Status: SHIPPED | OUTPATIENT
Start: 2024-04-15 | End: 2024-05-28

## 2024-04-15 NOTE — TELEPHONE ENCOUNTER
Creatinine   Date Value Ref Range Status   09/09/2022 0.85 0.51 - 0.95 mg/dL Final     Prescription for bridge refill approved per [facility] Refill Protocol.    CHRISTINE DuncanN, RN-[facility]

## 2024-04-15 NOTE — TELEPHONE ENCOUNTER
"Patient is completely out, this was not routed previously, please work immediately-      Patient is extremely frustrated and misdirecting her emotions. Would not let writer speak without several interruptions as she continued to ask questions that writer was unable to answer.      Was advised several times she needed to let writer speak to which she would reply \"speak\" then would continue interrupting.         "

## 2024-05-28 ENCOUNTER — OFFICE VISIT (OUTPATIENT)
Dept: FAMILY MEDICINE | Facility: CLINIC | Age: 74
End: 2024-05-28
Payer: COMMERCIAL

## 2024-05-28 VITALS
OXYGEN SATURATION: 98 % | HEART RATE: 81 BPM | SYSTOLIC BLOOD PRESSURE: 140 MMHG | WEIGHT: 137 LBS | RESPIRATION RATE: 21 BRPM | BODY MASS INDEX: 25.86 KG/M2 | DIASTOLIC BLOOD PRESSURE: 68 MMHG | HEIGHT: 61 IN | TEMPERATURE: 98.4 F

## 2024-05-28 DIAGNOSIS — I10 BENIGN ESSENTIAL HTN: Primary | ICD-10-CM

## 2024-05-28 DIAGNOSIS — G89.29 CHRONIC RIGHT SHOULDER PAIN: ICD-10-CM

## 2024-05-28 DIAGNOSIS — L90.0 LICHEN SCLEROSUS: ICD-10-CM

## 2024-05-28 DIAGNOSIS — C76.0 MALIGNANT NEOPLASM OF HEAD, FACE, AND NECK (H): ICD-10-CM

## 2024-05-28 DIAGNOSIS — M54.32 SCIATICA OF LEFT SIDE: ICD-10-CM

## 2024-05-28 DIAGNOSIS — M25.511 CHRONIC RIGHT SHOULDER PAIN: ICD-10-CM

## 2024-05-28 DIAGNOSIS — K64.4 EXTERNAL HEMORRHOIDS: ICD-10-CM

## 2024-05-28 DIAGNOSIS — R61 NIGHT SWEATS: ICD-10-CM

## 2024-05-28 PROCEDURE — G2211 COMPLEX E/M VISIT ADD ON: HCPCS | Performed by: NURSE PRACTITIONER

## 2024-05-28 PROCEDURE — 99215 OFFICE O/P EST HI 40 MIN: CPT | Performed by: NURSE PRACTITIONER

## 2024-05-28 RX ORDER — HYDROCORTISONE 25 MG/G
CREAM TOPICAL 2 TIMES DAILY PRN
Qty: 30 G | Refills: 0 | Status: SHIPPED | OUTPATIENT
Start: 2024-05-28 | End: 2024-10-04

## 2024-05-28 RX ORDER — RESPIRATORY SYNCYTIAL VIRUS VACCINE 120MCG/0.5
0.5 KIT INTRAMUSCULAR ONCE
Qty: 1 EACH | Refills: 0 | Status: CANCELLED | OUTPATIENT
Start: 2024-05-28 | End: 2024-05-28

## 2024-05-28 RX ORDER — LOSARTAN POTASSIUM 25 MG/1
25 TABLET ORAL DAILY
Qty: 90 TABLET | Refills: 0 | Status: SHIPPED | OUTPATIENT
Start: 2024-05-28 | End: 2024-07-01

## 2024-05-28 RX ORDER — GABAPENTIN 300 MG/1
300 CAPSULE ORAL AT BEDTIME
Qty: 90 CAPSULE | Refills: 0 | Status: SHIPPED | OUTPATIENT
Start: 2024-05-28 | End: 2024-07-01

## 2024-05-28 ASSESSMENT — PAIN SCALES - GENERAL: PAINLEVEL: SEVERE PAIN (7)

## 2024-05-28 NOTE — PROGRESS NOTES
Assessment & Plan     Benign essential HTN  Continues to have borderline elevated blood pressure.  She has lower diastolic pressure and has complained of infrequent dizziness consistent with orthostatic hypotension.  Suspect this could be secondary to her calcium channel blocker.  Agreeable to switching over to losartan today and we will have her follow-up in 1 to 2 months to reassess and increase dose as needed.  She has some reservations about switching medications and I spent a considerable amount of time talking about mechanism of action.  We can always restart calcium channel blocker if she does not tolerate ARB.  She recalls she was on losartan many years ago but does not remember why this medication was stopped.  - gabapentin (NEURONTIN) 300 MG capsule; Take 1 capsule (300 mg) by mouth at bedtime    Chronic right Shoulder pain  At present she is taking minimal acetaminophen.  Instructed her to increase her dose up to 4000 mg a day.  Would recommend taking a dose of at least 500 mg 30 to 60 minutes before bedtime as this seems to be the biggest trigger.  Suspect her joint pains may actually be tendinitis or bursitis as they worsen with pressure to the area.  Agreeable to PT referral.  We will follow-up in a month or 2 as mentioned above to reassess.    Sciatica of left side  Agreeable to referral to PT.  She has also been doing home yoga stretches.  We are starting gabapentin today for night sweats which I suspect will also help her joint pains.  If symptoms worsen or persist we can consider referral to orthopedics or sports medicine to discuss injections.  - Physical Therapy  Referral; Future  - gabapentin (NEURONTIN) 300 MG capsule; Take 1 capsule (300 mg) by mouth at bedtime    Night sweats  Unsure etiology, however, has been a persistent issue.  She is developing skin rashes secondary to the excessive sweating.  Agreeable to trial of Neurontin which I suspect will help her joint pains as  "mentioned above.  - gabapentin (NEURONTIN) 300 MG capsule; Take 1 capsule (300 mg) by mouth at bedtime    Lichen sclerosus  Has good relief with hydrocortisone.  Refills provided.    External hemorrhoids  Continue hydrocortisone.  - hydrocortisone, Perianal, (HYDROCORTISONE) 2.5 % cream; Place rectally 2 times daily as needed for hemorrhoids    Malignant neoplasm of head, face, and neck (H)  S/p resection.      I spent 45 minutes on the day of the encounter doing chart review, H&P, physical exam, formulating plan of care, and documenting.      The longitudinal plan of care for the diagnosis(es)/condition(s) as documented were addressed during this visit. Due to the added complexity in care, I will continue to support Jessica in the subsequent management and with ongoing continuity of care.      BMI  Estimated body mass index is 25.89 kg/m  as calculated from the following:    Height as of this encounter: 1.549 m (5' 1\").    Weight as of this encounter: 62.1 kg (137 lb).             Rowdy Loepz is a 73 year old, presenting for the following health issues:  Shoulder Pain        5/28/2024     2:43 PM   Additional Questions   Roomed by SHAILA Moreno   Accompanied by self         5/28/2024     2:43 PM   Patient Reported Additional Medications   Patient reports taking the following new medications none     History of Present Illness       Reason for visit:  Shoulder pain, meds check and other concerns.    She eats 2-3 servings of fruits and vegetables daily.She consumes 1 sweetened beverage(s) daily.She exercises with enough effort to increase her heart rate 30 to 60 minutes per day.  She exercises with enough effort to increase her heart rate 6 days per week.   She is taking medications regularly.     Joint pain in all parts of her body,  Lately with the weather, having pain in the left hip  WHen she goes to bed, if not falling asleep right away.  Laying on shoulder makes pain work.  Trying yoga, would like " "PT  Pressure worsens and reaching movement.      Frequent night sweats, developing rash under breasts.  Happens many nights.  Started after anesthesia.    Lichen sclerosis of vulva:  still has scaly area despite steroids but not itchy anymore.              Objective    BP (!) 140/68 (BP Location: Right arm, Patient Position: Sitting, Cuff Size: Adult Regular)   Pulse 81   Temp 98.4  F (36.9  C) (Temporal)   Resp 21   Ht 1.549 m (5' 1\")   Wt 62.1 kg (137 lb)   SpO2 98%   BMI 25.89 kg/m    Body mass index is 25.89 kg/m .  Physical Exam   GENERAL: alert and no distress  MS: no gross musculoskeletal defects noted, no edema            Signed Electronically by: KWAME Calles CNP    "

## 2024-06-06 ENCOUNTER — NURSE TRIAGE (OUTPATIENT)
Dept: FAMILY MEDICINE | Facility: CLINIC | Age: 74
End: 2024-06-06
Payer: COMMERCIAL

## 2024-06-06 NOTE — TELEPHONE ENCOUNTER
"Felt something running down her left arm last night around midnight  Felt like there was an insect bite near the elbow  Noticed it was bleeding and cleaned, applied pressure  Bite site looks like a small line or cut, approx 1/4\", and slightly pink/red around the area  Sore but not painful, denies itching, very slight swelling  Denies any other symptoms    Home care advice given but patient instructed to call back if any site symptoms worsen or new symptoms develop. The patient indicates understanding of these issues and agrees with the plan.    POLLO Rasmussen, RN (she/her)  [facility] RN      Reason for Disposition   Non-serious spider bite   Spider bite(s)    Additional Information   Negative: Passed out (i.e., fainted, collapsed and was not responding)   Negative: Wheezing or difficulty breathing   Negative: Hoarseness, cough or tightness in the throat or chest   Negative: Swollen tongue or difficulty swallowing   Negative: Life-threatening reaction (anaphylaxis) in the past to bite from same insect and < 2 hours since bite   Negative: Sounds like a life-threatening emergency to the triager   Negative: Difficulty breathing or swallowing   Negative: Difficult to awaken or acting confused (e.g., disoriented, slurred speech)   Negative: Pale cold skin and very weak (can't stand)   Negative: Sounds like a life-threatening emergency to the triager   Negative: Not a spider bite   Negative: Black  (or brown ) spider bite and local skin changes   Negative: Abdominal pain, chest tightness, or other muscle cramps   Negative: Urine is brown, black, or red in color   Negative: Vomiting   Negative: Patient sounds very sick or weak to the triager   Negative: SEVERE bite pain and not improved after 2 hours of pain medicine   Negative: Rash elsewhere on body that developed after spider bite   Negative: Fever and red area   Negative: Fever and area is very tender to touch   Negative: Red " streak or red line and length > 2 inches (5 cm)   Negative: Red or very tender (to touch) area, and started over 24 hours after the bite   Negative: Red or very tender (to touch) area, getting larger over 48 hours after the bite   Negative: Eye irritation after handling or touching a tarantula   Negative: Has diabetes mellitus with spider bite wound on foot   Negative: No prior tetanus shots (or is not fully vaccinated)   Negative: Patient wants to be seen   Negative: Scab drains pus or increases in size, and not improved after applying antibiotic ointment for 2 days   Negative: Bite starts to look bad (e.g., blister, purplish skin, ulcer)   Negative: Last tetanus shot > 10 years ago    Protocols used: Insect Bite-A-OH, Spider Bite - [place]fzbdrs-U-GL

## 2024-06-28 ENCOUNTER — TELEPHONE (OUTPATIENT)
Dept: FAMILY MEDICINE | Facility: CLINIC | Age: 74
End: 2024-06-28
Payer: COMMERCIAL

## 2024-06-28 NOTE — TELEPHONE ENCOUNTER
If she is symptomatic from her medication, we should follow up sooner.  This can be done virtually.

## 2024-06-28 NOTE — TELEPHONE ENCOUNTER
Symptoms    Describe your symptoms: possible side affect from Losartan  - alisa crawling in legs and arms daily infrequently  - stomach pain noticing the last three days.  Stomach pain is gone now   - denied rash, or SOB    Any pain: No    How long have you been having symptoms: a few days after starting the new medication       Have you been seen for this:  No    Appointment offered?: No    Triage offered?: No.  Patient reported this is side affect from medication.  When on Amlodipine, no side affect.     Home remedies tried: None    Preferred Pharmacy:   [facility] [address] [place] [address] [address] [address]  Phone: [phone] Fax: [phone]    Okay to leave a detailed message?: Yes at Cell number on file:    Telephone Information:   Mobile [phone]

## 2024-06-28 NOTE — TELEPHONE ENCOUNTER
Schedule appointment to discuss options.  She was supposed to follow up in a month per my last note.

## 2024-06-28 NOTE — TELEPHONE ENCOUNTER
Ordered     Pharmacy requested refills that are already active on file. Refused request to pharmacy.

## 2024-07-01 ENCOUNTER — VIRTUAL VISIT (OUTPATIENT)
Dept: FAMILY MEDICINE | Facility: CLINIC | Age: 74
End: 2024-07-01
Payer: COMMERCIAL

## 2024-07-01 DIAGNOSIS — I10 PRIMARY HYPERTENSION: Primary | ICD-10-CM

## 2024-07-01 PROCEDURE — 99441 PR PHYSICIAN TELEPHONE EVALUATION 5-10 MIN: CPT | Mod: 93 | Performed by: NURSE PRACTITIONER

## 2024-07-01 RX ORDER — AMLODIPINE BESYLATE 5 MG/1
5 TABLET ORAL DAILY
COMMUNITY
Start: 2024-07-01 | End: 2024-07-25

## 2024-07-01 NOTE — PROGRESS NOTES
Jessica is a 73 year old who is being evaluated via a billable telephone visit.    What phone number would you like to be contacted at? [phone]   How would you like to obtain your AVS? Mail a copy  Originating Location (pt. Location): Home    Distant Location (provider location):  Off-site    Assessment & Plan     Primary hypertension  Did not tolerate losartan due to side effects.  Previously tolerated amlodipine and we will restart this medication, instructed to check BP at home.  Had orthostatic hypotension, discussed prevention strategies.  AWV coming up on the 25th, will bring in home readings and we can reassess.    - amLODIPine (NORVASC) 5 MG tablet; Take 1 tablet (5 mg) by mouth daily    The longitudinal plan of care for the diagnosis(es)/condition(s) as documented were addressed during this visit. Due to the added complexity in care, I will continue to support Jessica in the subsequent management and with ongoing continuity of care.      Subjective   Jessica is a 73 year old, presenting for the following health issues:  Recheck Medication (Losartan medications side effects of pain in stomach, and tingling all over legs and arms. )      7/1/2024     1:55 PM   Additional Questions   Roomed by SHAILA Moreno   Accompanied by self         7/1/2024     1:55 PM   Patient Reported Additional Medications   Patient reports taking the following new medications none     History of Present Illness       Reason for visit:  Medications side effects    She eats 2-3 servings of fruits and vegetables daily.She consumes 1 sweetened beverage(s) daily.She exercises with enough effort to increase her heart rate 30 to 60 minutes per day.  She exercises with enough effort to increase her heart rate 6 days per week.   She is taking medications regularly.       Stopped her Losartan.  Had a crawling sensation in legs and arms, started after the Losartan.  A few days after that, started to get stomach pain.    Since stopping the Losartan,  "still having a little crawling.    Stopped medication on the 28th.                  Objective    Vitals - Patient Reported  Weight (Patient Reported): 61.7 kg (136 lb)  Height (Patient Reported): 154.9 cm (5' 1\")  BMI (Based on Pt Reported Ht/Wt): 25.7  Pain Score: Severe Pain (6)  Pain Loc: Abdomen        Physical Exam   General: Alert and no distress //Respiratory: No audible wheeze, cough, or shortness of breath // Psychiatric:  Appropriate affect, tone, and pace of words            Phone call duration: 8 minutes  Signed Electronically by: Nayely Law DNP    "

## 2024-07-25 ENCOUNTER — OFFICE VISIT (OUTPATIENT)
Dept: FAMILY MEDICINE | Facility: CLINIC | Age: 74
End: 2024-07-25
Payer: COMMERCIAL

## 2024-07-25 VITALS
WEIGHT: 134.6 LBS | HEART RATE: 96 BPM | OXYGEN SATURATION: 98 % | RESPIRATION RATE: 18 BRPM | HEIGHT: 61 IN | TEMPERATURE: 97.4 F | SYSTOLIC BLOOD PRESSURE: 151 MMHG | BODY MASS INDEX: 25.41 KG/M2 | DIASTOLIC BLOOD PRESSURE: 76 MMHG

## 2024-07-25 DIAGNOSIS — Z29.11 NEED FOR VACCINATION AGAINST RESPIRATORY SYNCYTIAL VIRUS: ICD-10-CM

## 2024-07-25 DIAGNOSIS — Z78.0 POST-MENOPAUSAL: ICD-10-CM

## 2024-07-25 DIAGNOSIS — L30.9 DERMATITIS: ICD-10-CM

## 2024-07-25 DIAGNOSIS — I10 PRIMARY HYPERTENSION: ICD-10-CM

## 2024-07-25 DIAGNOSIS — Z00.00 ENCOUNTER FOR MEDICARE ANNUAL WELLNESS EXAM: Primary | ICD-10-CM

## 2024-07-25 DIAGNOSIS — Z13.0 SCREENING FOR IRON DEFICIENCY ANEMIA: ICD-10-CM

## 2024-07-25 DIAGNOSIS — Q83.3 EXTRA NIPPLE: ICD-10-CM

## 2024-07-25 DIAGNOSIS — N90.4 LICHEN SCLEROSUS OF FEMALE GENITALIA: ICD-10-CM

## 2024-07-25 DIAGNOSIS — Z13.220 LIPID SCREENING: ICD-10-CM

## 2024-07-25 DIAGNOSIS — L91.8 SKIN TAG: ICD-10-CM

## 2024-07-25 DIAGNOSIS — Z13.1 SCREENING FOR DIABETES MELLITUS: ICD-10-CM

## 2024-07-25 DIAGNOSIS — Z12.11 SCREEN FOR COLON CANCER: ICD-10-CM

## 2024-07-25 DIAGNOSIS — Z23 NEED FOR SHINGLES VACCINE: ICD-10-CM

## 2024-07-25 DIAGNOSIS — E78.2 MIXED HYPERLIPIDEMIA: ICD-10-CM

## 2024-07-25 LAB
ALBUMIN SERPL BCG-MCNC: 4.9 G/DL (ref 3.5–5.2)
ALP SERPL-CCNC: 103 U/L (ref 40–150)
ALT SERPL W P-5'-P-CCNC: 15 U/L (ref 0–50)
ANION GAP SERPL CALCULATED.3IONS-SCNC: 9 MMOL/L (ref 7–15)
AST SERPL W P-5'-P-CCNC: 29 U/L (ref 0–45)
BILIRUB SERPL-MCNC: 0.4 MG/DL
BUN SERPL-MCNC: 19.5 MG/DL (ref 8–23)
CALCIUM SERPL-MCNC: 9.7 MG/DL (ref 8.8–10.4)
CHLORIDE SERPL-SCNC: 106 MMOL/L (ref 98–107)
CHOLEST SERPL-MCNC: 295 MG/DL
CREAT SERPL-MCNC: 0.88 MG/DL (ref 0.51–0.95)
EGFRCR SERPLBLD CKD-EPI 2021: 68 ML/MIN/1.73M2
ERYTHROCYTE [DISTWIDTH] IN BLOOD BY AUTOMATED COUNT: 12.4 % (ref 10–15)
FASTING STATUS PATIENT QL REPORTED: NO
FASTING STATUS PATIENT QL REPORTED: NO
GLUCOSE SERPL-MCNC: 88 MG/DL (ref 70–99)
HCO3 SERPL-SCNC: 25 MMOL/L (ref 22–29)
HCT VFR BLD AUTO: 43.7 % (ref 35–47)
HDLC SERPL-MCNC: 63 MG/DL
HGB BLD-MCNC: 14.2 G/DL (ref 11.7–15.7)
LDLC SERPL CALC-MCNC: 195 MG/DL
MCH RBC QN AUTO: 30.6 PG (ref 26.5–33)
MCHC RBC AUTO-ENTMCNC: 32.5 G/DL (ref 31.5–36.5)
MCV RBC AUTO: 94 FL (ref 78–100)
NONHDLC SERPL-MCNC: 232 MG/DL
PLATELET # BLD AUTO: 202 10E3/UL (ref 150–450)
POTASSIUM SERPL-SCNC: 4.5 MMOL/L (ref 3.4–5.3)
PROT SERPL-MCNC: 7.5 G/DL (ref 6.4–8.3)
RBC # BLD AUTO: 4.64 10E6/UL (ref 3.8–5.2)
SODIUM SERPL-SCNC: 140 MMOL/L (ref 135–145)
TRIGL SERPL-MCNC: 185 MG/DL
WBC # BLD AUTO: 7.2 10E3/UL (ref 4–11)

## 2024-07-25 PROCEDURE — 80061 LIPID PANEL: CPT | Performed by: NURSE PRACTITIONER

## 2024-07-25 PROCEDURE — 36415 COLL VENOUS BLD VENIPUNCTURE: CPT | Performed by: NURSE PRACTITIONER

## 2024-07-25 PROCEDURE — 80053 COMPREHEN METABOLIC PANEL: CPT | Performed by: NURSE PRACTITIONER

## 2024-07-25 PROCEDURE — 99214 OFFICE O/P EST MOD 30 MIN: CPT | Mod: 25 | Performed by: NURSE PRACTITIONER

## 2024-07-25 PROCEDURE — G0438 PPPS, INITIAL VISIT: HCPCS | Performed by: NURSE PRACTITIONER

## 2024-07-25 PROCEDURE — 85027 COMPLETE CBC AUTOMATED: CPT | Performed by: NURSE PRACTITIONER

## 2024-07-25 RX ORDER — TRIAMCINOLONE ACETONIDE 1 MG/G
OINTMENT TOPICAL 2 TIMES DAILY
Qty: 80 G | Refills: 2 | Status: SHIPPED | OUTPATIENT
Start: 2024-07-25

## 2024-07-25 RX ORDER — AMLODIPINE BESYLATE 5 MG/1
5 TABLET ORAL DAILY
Qty: 90 TABLET | Refills: 0 | Status: SHIPPED | OUTPATIENT
Start: 2024-07-25 | End: 2024-10-04

## 2024-07-25 SDOH — HEALTH STABILITY: PHYSICAL HEALTH: ON AVERAGE, HOW MANY DAYS PER WEEK DO YOU ENGAGE IN MODERATE TO STRENUOUS EXERCISE (LIKE A BRISK WALK)?: 4 DAYS

## 2024-07-25 ASSESSMENT — SOCIAL DETERMINANTS OF HEALTH (SDOH): HOW OFTEN DO YOU GET TOGETHER WITH FRIENDS OR RELATIVES?: PATIENT DECLINED

## 2024-07-25 ASSESSMENT — PAIN SCALES - GENERAL: PAINLEVEL: SEVERE PAIN (6)

## 2024-07-25 NOTE — PATIENT INSTRUCTIONS
Patient Education   Preventive Care Advice   This is general advice given by our system to help you stay healthy. However, your care team may have specific advice just for you. Please talk to your care team about your preventive care needs.  Nutrition  Eat 5 or more servings of fruits and vegetables each day.  Try wheat bread, brown rice and whole grain pasta (instead of white bread, rice, and pasta).  Get enough calcium and vitamin D. Check the label on foods and aim for 100% of the RDA (recommended daily allowance).  Lifestyle  Exercise at least 150 minutes each week  (30 minutes a day, 5 days a week).  Do muscle strengthening activities 2 days a week. These help control your weight and prevent disease.  No smoking.  Wear sunscreen to prevent skin cancer.  Have a dental exam and cleaning every 6 months.  Yearly exams  See your health care team every year to talk about:  Any changes in your health.  Any medicines your care team has prescribed.  Preventive care, family planning, and ways to prevent chronic diseases.  Shots (vaccines)   HPV shots (up to age 26), if you've never had them before.  Hepatitis B shots (up to age 59), if you've never had them before.  COVID-19 shot: Get this shot when it's due.  Flu shot: Get a flu shot every year.  Tetanus shot: Get a tetanus shot every 10 years.  Pneumococcal, hepatitis A, and RSV shots: Ask your care team if you need these based on your risk.  Shingles shot (for age 50 and up)  General health tests  Diabetes screening:  Starting at age 35, Get screened for diabetes at least every 3 years.  If you are younger than age 35, ask your care team if you should be screened for diabetes.  Cholesterol test: At age 39, start having a cholesterol test every 5 years, or more often if advised.  Bone density scan (DEXA): At age 50, ask your care team if you should have this scan for osteoporosis (brittle bones).  Hepatitis C: Get tested at least once in your life.  STIs (sexually  transmitted infections)  Before age 24: Ask your care team if you should be screened for STIs.  After age 24: Get screened for STIs if you're at risk. You are at risk for STIs (including HIV) if:  You are sexually active with more than one person.  You don't use condoms every time.  You or a partner was diagnosed with a sexually transmitted infection.  If you are at risk for HIV, ask about PrEP medicine to prevent HIV.  Get tested for HIV at least once in your life, whether you are at risk for HIV or not.  Cancer screening tests  Cervical cancer screening: If you have a cervix, begin getting regular cervical cancer screening tests starting at age 21.  Breast cancer scan (mammogram): If you've ever had breasts, begin having regular mammograms starting at age 40. This is a scan to check for breast cancer.  Colon cancer screening: It is important to start screening for colon cancer at age 45.  Have a colonoscopy test every 10 years (or more often if you're at risk) Or, ask your provider about stool tests like a FIT test every year or Cologuard test every 3 years.  To learn more about your testing options, visit:   .  For help making a decision, visit:   https://bit.ly/gd09133.  Prostate cancer screening test: If you have a prostate, ask your care team if a prostate cancer screening test (PSA) at age 55 is right for you.  Lung cancer screening: If you are a current or former smoker ages 50 to 80, ask your care team if ongoing lung cancer screenings are right for you.  For informational purposes only. Not to replace the advice of your health care provider. Copyright   2023 [facility]. All rights reserved. Clinically reviewed by the [facility] Transitions Program. [employer] 888742 - REV 01/24.  Substance Use Disorder: Care Instructions  Overview     You can improve your life and health by stopping your use of alcohol or drugs. When you don't drink or use drugs, you may feel and sleep better. You may  get along better with your family, friends, and coworkers. There are medicines and programs that can help with substance use disorder.  How can you care for yourself at home?  Here are some ways to help you stay sober and prevent relapse.  If you have been given medicine to help keep you sober or reduce your cravings, be sure to take it exactly as prescribed.  Talk to your doctor about programs that can help you stop using drugs or drinking alcohol.  Do not keep alcohol or drugs in your home.  Plan ahead. Think about what you'll say if other people ask you to drink or use drugs. Try not to spend time with people who drink or use drugs.  Use the time and money spent on drinking or drugs to do something that's important to you.  Preventing a relapse  Have a plan to deal with relapse. Learn to recognize changes in your thinking that lead you to drink or use drugs. Get help before you start to drink or use drugs again.  Try to stay away from situations, friends, or places that may lead you to drink or use drugs.  If you feel the need to drink alcohol or use drugs again, seek help right away. Call a trusted friend or family member. Some people get support from organizations such as Narcotics Anonymous or [employer] or from treatment facilities.  If you relapse, get help as soon as you can. Some people make a plan with another person that outlines what they want that person to do for them if they relapse. The plan usually includes how to handle the relapse and who to notify in case of relapse.  Don't give up. Remember that a relapse doesn't mean that you have failed. Use the experience to learn the triggers that lead you to drink or use drugs. Then quit again. Recovery is a lifelong process. Many people have several relapses before they are able to quit for good.  Follow-up care is a key part of your treatment and safety. Be sure to make and go to all appointments, and call your doctor if you are having problems. It's  "also a good idea to know your test results and keep a list of the medicines you take.  When should you call for help?   Call 911  anytime you think you may need emergency care. For example, call if you or someone else:    Has overdosed or has withdrawal signs. Be sure to tell the emergency workers that you are or someone else is using or trying to quit using drugs. Overdose or withdrawal signs may include:  Losing consciousness.  Seizure.  Seeing or hearing things that aren't there (hallucinations).     Is thinking or talking about suicide or harming others.   Where to get help 24 hours a day, 7 days a week   If you or someone you know talks about suicide, self-harm, a mental health crisis, a substance use crisis, or any other kind of emotional distress, get help right away. You can:    Call the Suicide and Crisis Lifeline at 988.     Call 6-356-348-TALK (1-544.288.7006).     Text HOME to 072437 to access the Crisis Text Line.   Consider saving these numbers in your phone.  Go to [employer] for more information or to chat online.  Call your doctor now or seek immediate medical care if:    You are having withdrawal symptoms. These may include nausea or vomiting, sweating, shakiness, and anxiety.   Watch closely for changes in your health, and be sure to contact your doctor if:    You have a relapse.     You need more help or support to stop.   Where can you learn more?  Go to https://www.[employer].net/patiented  Enter H573 in the search box to learn more about \"Substance Use Disorder: Care Instructions.\"  Current as of: November 15, 2023               Content Version: 14.0    0275-6909 [employer].   Care instructions adapted under license by your healthcare professional. If you have questions about a medical condition or this instruction, always ask your healthcare professional. [employer] disclaims any warranty or liability for your use of this information.         "

## 2024-07-25 NOTE — PROGRESS NOTES
Preventive Care Visit  [facility]  Nayely Law, BRANDY, Nurse Practitioner Primary Care  Jul 25, 2024      Assessment & Plan     Encounter for Medicare annual wellness exam  Reviewed medical/social/family history and health maintenance     Primary hypertension  She is quite resistant to increasing her medication despite multiple elevated readings on different occasions.  Some of this certainly could be anxiety and whitecoat syndrome.  I spent a considerable amount of time today discussing various treatment options and mechanism of action of medications.  She had experienced skin crawling sensation and stomachache with losartan, however, I am not entirely convinced this was secondary to these medications.  She also reports some dizziness though this sounds more consistent with vestibular issues or could be secondary to perception secondary to eye removal.  We compromised today and I asked her to check her blood pressure at home and send me in readings in a couple weeks.  Will continue to offer support and recommend adjustments in as indicated.  - amLODIPine (NORVASC) 5 MG tablet; Take 1 tablet (5 mg) by mouth daily    Lichen sclerosus of female genitalia  Has been using topical steroids only as needed.  We discussed she would likely need about 12 weeks of treatment in order to notice improvement.  If no improvement will refer to ObGYN for further recommendations.    - triamcinolone (KENALOG) 0.1 % external ointment; Apply topically 2 times daily    Extra nipple  Discussed this is quite common and benign.  She desires removal, but we discussed the risk really outweigh the benefits.  Could refer to derm to discuss further.      Skin tag  Will have her return to clinic for removal at another day.     Dermatitis  In skin folds, controlled with hydrocortisone.      Need for shingles vaccine  Discussed available through the pharmacy    Need for vaccination against respiratory syncytial  virus  Available through the pharmacy.    Screen for colon cancer  Declined    Screening for iron deficiency anemia  - CBC with platelets; Future  - CBC with platelets    Screening for diabetes mellitus  - Comprehensive metabolic panel (BMP + Alb, Alk Phos, ALT, AST, Total. Bili, TP); Future  - Comprehensive metabolic panel (BMP + Alb, Alk Phos, ALT, AST, Total. Bili, TP)    Lipid screening  - Lipid panel reflex to direct LDL Fasting; Future  - Lipid panel reflex to direct LDL Fasting    Post-menopausal  Declined.      Patient has been advised of split billing requirements and indicates understanding: Yes        Counseling  Appropriate preventive services were addressed with this patient via screening, questionnaire, or discussion as appropriate for fall prevention, nutrition, physical activity, Tobacco-use cessation, weight loss and cognition.  Checklist reviewing preventive services available has been given to the patient.  Reviewed patient's diet, addressing concerns and/or questions.   She is at risk for psychosocial distress and has been provided with information to reduce risk.     In addition to the preventive visit, 40  minutes of the appointment were spent evaluating and developing a treatment plan for her additional concern(s).          Rowdy Lopez is a 73 year old, presenting for the following:  Medicare Visit (Redness under breast and panty line, possible allergy./Lichen sclerosus concern. Has soreness, want to see if something better can be done for it./Lumps - on right breast and under arm area on leg side. Skin tags on under arms. /Follow up on arthritis and sciatica./Wondering why sitting for a while, causes stiffness. Has to stand for a while to be able to get going. Can arthritis cause this. Does yoga throughout the week. General no pain when stiff. )        7/25/2024     2:25 PM   Additional Questions   Roomed by Tiffanie LEWIS       Health Care Directive  Patient does not have a Health Care  Directive or Living Will: Patient states has Advance Directive and will bring in a copy to clinic.    HPI  Not checking BP at home    Dizziness, not happening when she changes position, happens more when she has been up for a while.  Feels like she needs to step back.        Would like PT for sciatica, knee, and shoulder.  Working on getting prosthesis for eye, so was not able to get in with PT yet.    Lichen sclerosis- left labia is very tough.  When urinating, wiping hurts.  Wondering if this can be treated.  Using the hydrocortisone only with a flare.  Certain foods cause it to flare.      Extra right nipple, skin tags- wondering about removal            7/25/2024   General Health   How would you rate your overall physical health? Good   Feel stress (tense, anxious, or unable to sleep) Only a little      (!) STRESS CONCERN      7/25/2024   Nutrition   Diet: Gluten-free/reduced            7/25/2024   Exercise   Days per week of moderate/strenous exercise 4 days            7/25/2024   Social Factors   Frequency of gathering with friends or relatives Patient declined   Worry food won't last until get money to buy more No   Food not last or not have enough money for food? No   Do you have housing? (Housing is defined as stable permanent housing and does not include staying ouside in a car, in a tent, in an abandoned building, in an overnight shelter, or couch-surfing.) Yes   Are you worried about losing your housing? No   Lack of transportation? No   Unable to get utilities (heat,electricity)? No            7/25/2024   Fall Risk   Fallen 2 or more times in the past year? No   Trouble with walking or balance? No             7/25/2024   Activities of Daily Living- Home Safety   Needs help with the following daily activites None of the above   Safety concerns in the home None of the above            7/25/2024   Dental   Dentist two times every year? Yes            7/25/2024   Hearing Screening   Hearing concerns? None  of the above            7/25/2024   Driving Risk Screening   Patient/family members have concerns about driving No            7/25/2024   General Alertness/Fatigue Screening   Have you been more tired than usual lately? No            7/25/2024   Urinary Incontinence Screening   Bothered by leaking urine in past 6 months No            7/25/2024   TB Screening   Were you born outside of the [place]? No            Today's PHQ-2 Score:       7/25/2024     1:58 PM   PHQ-2 ( 1999 [employer])   Q1: Little interest or pleasure in doing things 0   Q2: Feeling down, depressed or hopeless 0   PHQ-2 Score 0   Q1: Little interest or pleasure in doing things Not at all   Q2: Feeling down, depressed or hopeless Not at all   PHQ-2 Score 0           7/25/2024   Substance Use   Alcohol more than 3/day or more than 7/wk Not Applicable   Do you have a current opioid prescription? No   How severe/bad is pain from 1 to 10? 6/10   Do you use any other substances recreationally? (!) OTHER        Social History     Tobacco Use    Smoking status: Never    Smokeless tobacco: Never   Vaping Use    Vaping status: Never Used   Substance Use Topics    Alcohol use: Not Currently    Drug use: Never           7/19/2022   LAST FHS-7 RESULTS   1st degree relative breast or ovarian cancer No   Any relative bilateral breast cancer No   Any male have breast cancer No   Any ONE woman have BOTH breast AND ovarian cancer No   Any woman with breast cancer before 50yrs No   2 or more relatives with breast AND/OR ovarian cancer No   2 or more relatives with breast AND/OR bowel cancer No           Mammogram Screening - After age 74- determine frequency with patient based on health status, life expectancy and patient goals    ASCVD Risk   The ASCVD Risk score (Roshan ADDISON, et al., 2019) failed to calculate for the following reasons:    Cannot find a previous HDL lab    Cannot find a previous total cholesterol lab            Reviewed and updated as needed this  "visit by Provider                      Current providers sharing in care for this patient include:  Patient Care Team:  Nayely Law DNP as PCP - General (Nurse Practitioner Primary Care)  Bridgette Brian, RN as Specialty Care Coordinator (Hematology & Oncology)  Nayely Law DNP as Assigned PCP    The following health maintenance items are reviewed in Epic and correct as of today:  Health Maintenance   Topic Date Due    DEXA  Never done    ANNUAL REVIEW OF  ORDERS  Never done    COLORECTAL CANCER SCREENING  Never done    HEPATITIS C SCREENING  Never done    LIPID  Never done    ZOSTER IMMUNIZATION (1 of 2) Never done    RSV VACCINE (Pregnancy & 60+) (1 - 1-dose 60+ series) Never done    MEDICARE ANNUAL WELLNESS VISIT  Never done    Pneumococcal Vaccine: 65+ Years (1 of 1 - PCV) Never done    COVID-19 Vaccine (6 - 2023-24 season) 04/01/2024    INFLUENZA VACCINE (1) 09/01/2024    FALL RISK ASSESSMENT  07/25/2025    GLUCOSE  02/27/2026    ADVANCE CARE PLANNING  12/01/2028    DTAP/TDAP/TD IMMUNIZATION (2 - Td or Tdap) 08/19/2029    PHQ-2 (once per calendar year)  Completed    IPV IMMUNIZATION  Aged Out    HPV IMMUNIZATION  Aged Out    MENINGITIS IMMUNIZATION  Aged Out    RSV MONOCLONAL ANTIBODY  Aged Out    MAMMO SCREENING  Discontinued            Objective    Exam  BP (!) 158/76   Pulse 96   Temp 97.4  F (36.3  C) (Temporal)   Resp 18   Ht 1.542 m (5' 0.71\")   Wt 61.1 kg (134 lb 9.6 oz)   SpO2 98%   BMI 25.68 kg/m     Estimated body mass index is 25.68 kg/m  as calculated from the following:    Height as of this encounter: 1.542 m (5' 0.71\").    Weight as of this encounter: 61.1 kg (134 lb 9.6 oz).    Physical Exam  GENERAL: alert and no distress  NECK: no adenopathy, no asymmetry, masses, or scars  ABDOMEN: soft, nontender, no hepatosplenomegaly, no masses and bowel sounds normal  MS: no gross musculoskeletal defects noted, no edema        7/25/2024   Mini Cog   Clock Draw Score 2 Normal   3 Item " Recall 3 objects recalled   Mini Cog Total Score 5                 Signed Electronically by: Nayely Law DNP

## 2024-07-29 ENCOUNTER — TELEPHONE (OUTPATIENT)
Dept: FAMILY MEDICINE | Facility: CLINIC | Age: 74
End: 2024-07-29
Payer: COMMERCIAL

## 2024-07-29 NOTE — TELEPHONE ENCOUNTER
----- Message from Nayely Law sent at 7/29/2024  3:26 PM CDT -----  Please notify.  Cholesterol is extremely high.  Based on the ASCVD risk score, she has a 30% chance of having a stoke or cardiac event in the next 10 years.  I strongly advise a statin or cholesterol lowering medication.  I am going to place referral to preventative cardiology so she can discuss further.  Her elevated BP further complicates this risk.

## 2024-07-29 NOTE — TELEPHONE ENCOUNTER
"Nayely,    Your comments relayed to pt    Pt declines recommendation of statin    She declines the recommendation of cardiology as well    Feels quite healthy  \"I will take my chances\"    This FYI only    Anu Patrick RN, BSN  [facility]         "

## 2024-07-29 NOTE — TELEPHONE ENCOUNTER
Attempt #1. No answer. Left message on patient's voicemail to call back and speak with a triage nurse.     Zoe Parikh RN  [facility]

## 2024-10-03 NOTE — PROGRESS NOTES
PHYSICAL THERAPY EVALUATION  Type of Visit: Evaluation       Fall Risk Screen:  Fall screen completed by: PT  Have you fallen 2 or more times in the past year?: No  Have you fallen and had an injury in the past year?: No  Is patient a fall risk?: No    Subjective       Presenting condition or subjective complaint: Right shoulder pain for about 1 year, left low leg pain for 3-4 years, arthritis pain  Date of onset: 24 (MD order)    Relevant medical history: Arthritis; Cancer; High blood pressure; Osteoarthritis   Dates & types of surgery:      Prior diagnostic imaging/testing results:       Prior therapy history for the same diagnosis, illness or injury: No      Prior Level of Function  Transfers: Independent  Ambulation: Independent  ADL: Independent  IADL: Finances, Housekeeping, Laundry, Meal preparation    Living Environment  Social support: Alone   Type of home: Apartment/condo   Stairs to enter the home:         Ramp:     Stairs inside the home:         Help at home: None  Equipment owned:       Employment: No    Hobbies/Interests: Reading, yoga, study spiritual/philosophical topics, alks, dinner with friends    Patient goals for therapy: walk more easily    Pain assessment: Location: left lateral calf, right anterior shoulder /Ratin-4/10     Objective   LUMBAR SPINE EVALUATION  PAIN: Pain is Exacerbated By: walking  Pain is Relieved By: otc medications and rest    POSTURE: Standing Posture: Lordosis decreased    ROM:   (Degrees) Left AROM Left PROM  Right AROM Right PROM   Lumbar Side glide Nil loss Nil loss   Lumbar Flexion Nil loss, + stretch   Lumbar Extension Mod loss   Pain:   End feel:   PELVIC/SI SCREEN: Sacroiliac Provocation Test: - thigh thrust, - distraction   STRENGTH:   Pain: - none + mild ++ moderate +++ severe  Strength Scale: 0-5/5 Left Right   Hip Flexion 5 5   Hip Extension 4 4   Hip Abduction 4 4   Hip Adduction 5 5   Hip Internal Rotation 5- 5-   Hip External Rotation 4+ 4+    Ankle eversion 3+, + (mild) 4   Ankle inversion 4+ 4+       MYOTOMES: WNL  DTR S: WNL  CORD SIGNS: WNL  DERMATOMES: WNL    FLEXIBILITY:  increased peroneal muscle tension  LUMBAR/HIP Special Tests:    Left Right   GARETH Negative  Negative    FADIR/Labrum/FREDIS Negative  Negative    Femoral Nerve Negative  Negative    Mary Ann's Negative  Negative    Piriformis Negative  Negative    Quadrant Testing Negative  Negative    SLR Negative  Negative    Slump Negative  Negative    Stork with Extension Negative  Negative    Hema Negative  Negative      FUNCTIONAL TESTS: Double Leg Squat: Anterior knee translation, Knee valgus, Hip internal rotation, and Improper use of glutes/hips  SLS: contralateral hip drop  PALPATION:  TTP along peroneal muscle group, tension formed but also hyperalgesia   SPINAL SEGMENTAL CONCLUSIONS:  will assess in future     SHOULDER EVALUATION  PAIN: Pain is Exacerbated By: reaching across body and overhead   Pain is Relieved By: rest    POSTURE: Sitting Posture: Rounded shoulders, Forward head, Thoracic kyphosis increased    ROM:   (Degrees) Left AROM Left PROM Right AROM  Right PROM   Shoulder Flexion 160   160 + pain beginning at 90    Shoulder Abduction 160  160 + pain during eccentric lowering    Shoulder External Rotation 60  60 +++ pain    Shoulder EXT/IR T5  L1 with pain    Pain:   End feel:     STRENGTH:   Pain: - none + mild ++ moderate +++ severe  Strength Scale: 0-5/5 Left Right   Shoulder Flexion 5- 4, ++ (mod)   Shoulder Abduction 5- 4, ++ (mod)   Shoulder Internal Rotation 5 5   Shoulder External Rotation 4+ 4-, + (mild)   Mid Trap 4+ 4+   Lower Trap 3+ 3+   Rhomboid 5 5   Serratus Anterior 3+ 3+     FLEXIBILITY:  bilateral pec tighntess  SPECIAL TESTS:    Left Right   Impingement     Neer's Negative  Positive   Hawkin's-Randall Negative  Positive   Coracoid Impingement Negative  Negative    Internal impingement Negative  Negative    Biceps      Speed's Negative  Positive   Rotator Cuff  Tear     Drop Arm Negative  Negative    Belly Press Negative  Negative    Lift off  Negative  Negative      PALPATION:  TTP along biceps insertion  JOINT MOBILITY:  stiffness posteriorly   CERVICAL SCREEN:  extension = mod loss    Assessment & Plan   CLINICAL IMPRESSIONS  Medical Diagnosis: sciatica of left side    Treatment Diagnosis: Mechanical back pain and R shoulder pain   Impression/Assessment: Patient is a 73 year old female with low back, knee, shoulder complaints.  The following significant findings have been identified: Pain, Decreased ROM/flexibility, Decreased joint mobility, Decreased strength, Impaired balance, and Decreased proprioception. These impairments interfere with their ability to perform self care tasks, recreational activities, household chores, and community mobility as compared to previous level of function.     Clinical Decision Making (Complexity):  Clinical Presentation: Stable/Uncomplicated  Clinical Presentation Rationale: based on medical and personal factors listed in PT evaluation  Clinical Decision Making (Complexity): Low complexity    PLAN OF CARE  Treatment Interventions:  Modalities: Cryotherapy, Dry Needling  Interventions: Gait Training, Manual Therapy, Neuromuscular Re-education, Therapeutic Activity, Therapeutic Exercise, Self-Care/Home Management    Long Term Goals     PT Goal 1  Goal Identifier: Walking  Goal Description: Patient will ambulate for 20 minutes on even ground, independently with 0/10 pain  Rationale: to maximize safety and independence with performance of ADLs and functional tasks;to maximize safety and independence within the community;to maximize safety and independence with self cares  Target Date: 12/27/24  PT Goal 2  Goal Identifier: reaching  Goal Description: patient will reach above head BW with 1/10 pain  Rationale: to maximize safety and independence with performance of ADLs and functional tasks;to maximize safety and independence within the  community;to maximize safety and independence with self cares  Target Date: 12/27/24      Frequency of Treatment: 1x per week 4 weeks, 2x per month 2 months  Duration of Treatment: up to 84 days    Recommended Referrals to Other Professionals:  potentially sports and ortho  Education Assessment:   Learner/Method: Patient;No Barriers to Learning    Risks and benefits of evaluation/treatment have been explained.   Patient/Family/caregiver agrees with Plan of Care.     Evaluation Time:     PT Eval, Low Complexity Minutes (54244): 20     Signing Clinician: Luda Hill, PT        [facility]                                                                                   OUTPATIENT PHYSICAL THERAPY      PLAN OF TREATMENT FOR OUTPATIENT REHABILITATION   Patient's Last Name, First Name, Jessica Rankin YOB: 1950   Provider's Name   [facility]   Medical Record No.  [id number]     Onset Date: 05/26/24 (MD order)  Start of Care Date: 10/04/24     Medical Diagnosis:  sciatica of left side      PT Treatment Diagnosis:  Mechanical back pain and R shoulder pain Plan of Treatment  Frequency/Duration: 1x per week 4 weeks, 2x per month 2 months/ up to 84 days    Certification date from 10/04/24 to 12/27/24         See note for plan of treatment details and functional goals     Luda Hill, PT                         I CERTIFY THE NEED FOR THESE SERVICES FURNISHED UNDER        THIS PLAN OF TREATMENT AND WHILE UNDER MY CARE     (Physician attestation of this document indicates review and certification of the therapy plan).              Referring Provider:  Nayely Law    Initial Assessment  See Epic Evaluation- Start of Care Date: 10/04/24

## 2024-10-04 ENCOUNTER — THERAPY VISIT (OUTPATIENT)
Dept: PHYSICAL THERAPY | Facility: CLINIC | Age: 74
End: 2024-10-04
Attending: NURSE PRACTITIONER
Payer: COMMERCIAL

## 2024-10-04 DIAGNOSIS — K64.4 EXTERNAL HEMORRHOIDS: ICD-10-CM

## 2024-10-04 DIAGNOSIS — I10 PRIMARY HYPERTENSION: ICD-10-CM

## 2024-10-04 DIAGNOSIS — M54.32 SCIATICA OF LEFT SIDE: ICD-10-CM

## 2024-10-04 PROCEDURE — 97110 THERAPEUTIC EXERCISES: CPT | Mod: GP | Performed by: PHYSICAL THERAPIST

## 2024-10-04 PROCEDURE — 97161 PT EVAL LOW COMPLEX 20 MIN: CPT | Mod: GP | Performed by: PHYSICAL THERAPIST

## 2024-10-04 RX ORDER — AMLODIPINE BESYLATE 5 MG/1
5 TABLET ORAL DAILY
Qty: 90 TABLET | Refills: 0 | Status: SHIPPED | OUTPATIENT
Start: 2024-10-04

## 2024-10-04 RX ORDER — HYDROCORTISONE 25 MG/G
CREAM TOPICAL
Qty: 30 G | Refills: 0 | Status: SHIPPED | OUTPATIENT
Start: 2024-10-04

## 2024-10-28 ENCOUNTER — OFFICE VISIT (OUTPATIENT)
Dept: OBGYN | Facility: CLINIC | Age: 74
End: 2024-10-28
Payer: COMMERCIAL

## 2024-10-28 VITALS — DIASTOLIC BLOOD PRESSURE: 78 MMHG | SYSTOLIC BLOOD PRESSURE: 152 MMHG

## 2024-10-28 DIAGNOSIS — N90.4 LICHEN SCLEROSUS OF FEMALE GENITALIA: Primary | ICD-10-CM

## 2024-10-28 PROCEDURE — 99459 PELVIC EXAMINATION: CPT | Performed by: OBSTETRICS & GYNECOLOGY

## 2024-10-28 PROCEDURE — 99204 OFFICE O/P NEW MOD 45 MIN: CPT | Performed by: OBSTETRICS & GYNECOLOGY

## 2024-10-28 PROCEDURE — G2211 COMPLEX E/M VISIT ADD ON: HCPCS | Performed by: OBSTETRICS & GYNECOLOGY

## 2024-10-28 RX ORDER — CLOBETASOL PROPIONATE 0.5 MG/G
OINTMENT TOPICAL DAILY
Qty: 30 G | Refills: 3 | Status: SHIPPED | OUTPATIENT
Start: 2024-10-28

## 2024-10-28 NOTE — PATIENT INSTRUCTIONS
Lichen Sclerosis    Lichen sclerosis is a chronic condition that occurs most often in postmenopausal women, usually involving itching, burning, and pain at the vulva. It likely occurs in 1 in 50 post-menopausal women. It is a benign, chronic, progressive skin condition characterized by inflammation, skin thinning, and distinctive change in the skin architecture of the vulva. The course usually includes frequent recurrences and remissions of signs and symptoms.    Although the risk of vulvar squamous cell carcinoma in women with vulvar Lichen Sclerosis is increased, this risk is estimated to be less than 5 percent. The vulva should be examined at least yearly for the remainder of your life and nonresolving lesions of localized thickened skin should be biopsied.  The goals of treatment is resolution of the symptoms (itching and pain) and signs of disease, including thickened skin, fissuring, and bruising.     Topical steroid ointment treatment is a highly effective therapy that can prevent progression of the disease. It is important to avoid scratching the area to prevent further progression.    Initially, we will try clobetasol ointment or cream nightly for 4 weeks then every other night for four weeks, then twice weekly for four weeks, using 1/2 fingertip unit to apply to all affected areas, with particular focus on the area of skin thickening at the vaginal opening.   Avoid application to any hair bearing areas of the vulva.    You should examine the vulvar area with a mirror and fingertips on a monthly basis and should return for evaluation if you see thickened areas of skin or sores that do not heal    Return at the end of the initial treatment course (6-12 weeks after starting therapy) to evaluate for continued need for treatment. Often the best results are seen when patient continue treatment 2-3 nights per week indefinitely.   If you ever develop worsening symptoms, I would ask you to increase the use of the  ointment again to day for 7-10 days, then decrease to twice a week again. If you have worsening symptoms that do not respond to a burst of treatment as described, please return for evaluation.     Please apply daily for 4 weeks, every other day for 4 weeks, then return for evaluation. If ongoing thickening at 4 o'clock, we will plan to biopsy that area.     Use once daily for 4 weeks, then every other day for 4 weeks. For maintenance, you will use it twice weekly ongoing.

## 2024-10-28 NOTE — PROGRESS NOTES
SUBJECTIVE:   CC: establish Gyn care for LS                                               Jessica Zamorano is a 73 year old  female who presents to clinic today for management of lichen sclerosus. She was diagnosed about 10 years ago and has been using hydrocortisone cream as needed. Recently  her PCP advised switching to kenalog ointment which she used for about a week but stopped due to irritation.   - no vaginal bleeding  - not currently sexually active  - no prior vulvar biopsies    Problem list and histories reviewed & adjusted, as indicated.  Additional history: as documented.      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 [place]    IR CHEST PORT PLACEMENT > 5 YRS OF AGE  2022    IR PORT REMOVAL RIGHT  10/17/2022    REMOVE PORT VASCULAR ACCESS Right 10/17/2022    Procedure: REMOVAL, VASCULAR ACCESS PORT RIGHT;  Surgeon: Toñito Kolb MD;  Location: [facility] OR      Social History     Tobacco Use    Smoking status: Never    Smokeless tobacco: Never   Substance Use Topics    Alcohol use: Not Currently           Negative family history of: Glaucoma, Macular Degeneration              Current Outpatient Medications   Medication Sig Dispense Refill    amLODIPine (NORVASC) 5 MG tablet TAKE ONE TABLET BY MOUTH ONCE DAILY 90 tablet 0    hydrocortisone, Perianal, (ANUSOL-HC) 2.5 % cream APPLY RECTALLY 2 TIMES DAILY AS NEEDED FOR HEMORRHOIDS 30 g 0    hydrOXYzine (ATARAX) 25 MG tablet Take 1 tablet (25 mg) by mouth 3  times daily as needed for itching 60 tablet 1    triamcinolone (KENALOG) 0.1 % external ointment Apply topically 2 times daily (Patient not taking: Reported on 10/28/2024) 80 g 2     No current facility-administered medications for this visit.     Allergies   Allergen Reactions    Hyaluronan Other (See Comments)     Stinging in eye     Prednisolone Other (See Comments)     Extreme dryness in eye  Extreme dryness in eye  Extreme dryness in eye  Extreme dryness in eye      Sodium Hyaluronate (Tony) Other (See Comments)     Stinging in eye     Dexamethasone Rash    Metronidazole Rash    Povidone Iodine Rash    Tobramycin Rash       OBJECTIVE:   BP (!) 152/78    Const: sitting in chair in no acute distress, comfortable  Eyes: no scleral icterus, EOMI  CV: regular rate, well perfused  Pulm: no increased work of breathing, no cough  Skin: warm and dry, no rashes/lesions  Psych: mood stable, appropriate affect  Abd: soft, no hepatosplenomegaly, non-tender to palpation  Neuro: A+Ox3   : External genitalia atrophic with fusion of posterior left labia minora with majora. Some pallor overlying the clitoral beltre. Pallor at the posterior fourchette and skin thickening at 4 o'clock at the introitus    ASSESSMENT/PLAN:                                                    Jessica Zamorano is a 73 year old female  here to initiate Gyn management of LS. It was diagnosed clinically 10 years ago, she has never seen Gyn for this condition. She has noted increased pruritus and skin thickening recently and did not tolerate kenalog.     1. Lichen sclerosus of female genitalia (Primary)  Today we discussed that lichen sclerosus is a chronic, remitting and relapsing skin condition that affects the vulvar skin but may also affect other areas of the body. It is a relatively common cause of vulvar itching, irritation, and loss of normal architecture. It is also a frequent cause of painful intercourse. The mainstay of therapy is high  potency steroids, used frequently for initial treatment and for relapses, and used twice a week chronically for suppression of symptoms. Because the presence of lichen sclerosus increases the risk of vulvar cancer, she needs an exam of the vulva at least once a year, and perhaps more often as her symptoms warrant. Written information about lichen sclerosus was given to her outlining these instructions.    I would like her to use clobetasol ointment in a thin layer applied to the vulva daily for 4 weeks, every other day for 4 weeks, and then to see me back. I will likely have her use it for an additional 4 weeks on an every other day basis, and then we can discuss decreasing to twice a week for maintenance, with plans to treat any recurrence of symptoms by increasing to twice daily again for 10 days.    Vulvar hygiene measures discussed as well.   - clobetasol (TEMOVATE) 0.05 % external ointment; Apply topically daily. Use once daily for 4 weeks, then every other day for 4 weeks. For maintenance, you will use it twice weekly ongoing.  Dispense: 30 g; Refill: 3     Plan biopsy in area of thickening at 4 o'clock if no improvement in 8 weeks of consistent therapy.       Jordyn Mcrae MD  Obstetrics and Gynecology   [facility] [place]

## 2024-12-02 PROBLEM — M54.32 SCIATICA OF LEFT SIDE: Status: RESOLVED | Noted: 2024-10-04 | Resolved: 2024-12-02

## 2025-01-27 ENCOUNTER — THERAPY VISIT (OUTPATIENT)
Dept: PHYSICAL THERAPY | Facility: CLINIC | Age: 75
End: 2025-01-27
Payer: COMMERCIAL

## 2025-01-27 DIAGNOSIS — M25.512 SHOULDER PAIN, LEFT: Primary | ICD-10-CM

## 2025-01-27 DIAGNOSIS — M79.662 PAIN OF LEFT LOWER LEG: ICD-10-CM

## 2025-01-27 PROCEDURE — 97161 PT EVAL LOW COMPLEX 20 MIN: CPT | Mod: GP | Performed by: PHYSICAL THERAPIST

## 2025-01-27 PROCEDURE — 97110 THERAPEUTIC EXERCISES: CPT | Mod: GP | Performed by: PHYSICAL THERAPIST

## 2025-01-27 ASSESSMENT — ACTIVITIES OF DAILY LIVING (ADL)
AT_ITS_WORST?: 6
HOW_WOULD_YOU_RATE_THE_CURRENT_FUNCTION_OF_YOUR_KNEE_DURING_YOUR_USUAL_DAILY_ACTIVITIES_ON_A_SCALE_FROM_0_TO_100_WITH_100_BEING_YOUR_LEVEL_OF_KNEE_FUNCTION_PRIOR_TO_YOUR_INJURY_AND_0_BEING_THE_INABILITY_TO_PERFORM_ANY_OF_YOUR_USUAL_DAILY_ACTIVITIES?: 60
HOW_WOULD_YOU_RATE_THE_OVERALL_FUNCTION_OF_YOUR_KNEE_DURING_YOUR_USUAL_DAILY_ACTIVITIES?: NEARLY NORMAL
HOW_WOULD_YOU_RATE_THE_OVERALL_FUNCTION_OF_YOUR_KNEE_DURING_YOUR_USUAL_DAILY_ACTIVITIES?: NEARLY NORMAL
HOW_WOULD_YOU_RATE_THE_CURRENT_FUNCTION_OF_YOUR_KNEE_DURING_YOUR_USUAL_DAILY_ACTIVITIES_ON_A_SCALE_FROM_0_TO_100_WITH_100_BEING_YOUR_LEVEL_OF_KNEE_FUNCTION_PRIOR_TO_YOUR_INJURY_AND_0_BEING_THE_INABILITY_TO_PERFORM_ANY_OF_YOUR_USUAL_DAILY_ACTIVITIES?: 60
PLEASE_INDICATE_YOR_PRIMARY_REASON_FOR_REFERRAL_TO_THERAPY:: SHOULDER
PLEASE_INDICATE_YOR_PRIMARY_REASON_FOR_REFERRAL_TO_THERAPY:: KNEE
WHEN_LYING_ON_THE_INVOLVED_SIDE: 7

## 2025-01-27 NOTE — PROGRESS NOTES
"PHYSICAL THERAPY EVALUATION  Type of Visit: Evaluation       Fall Risk Screen:  Have you fallen 2 or more times in the past year?: No  Have you fallen and had an injury in the past year?: No  Is patient a fall risk?: No    Subjective   KEY PT FINDINGS:  1) Suspect rotator cuff dysfunction for right shoulder (Pain with resisted ER and abduction)  2) Lower leg - unclear etiology, previously tried DOP/MDT approach  3) TTP through anterior tib, Weakness through ankle muscles     Physical Therapy Initial Evaluation: Subjective History     Injury/Condition Details:  Presenting Complaint Right shoulder, left shin    Onset Timing/Date October 2024 - MD referral    Mechanism Tried to start PT back in October, did 1 visit. Did the exercises for 1 month.   Some of the exercises were not comfortable. Eventually stopped all the exercises.   Returned to PT to address the leg primarily - cannot walk very much right now and \"doesn't want to live like this\" and wants to address the right shoulder - she has pain with some of the yoga exercises.      Symptom Behavior Details    Primary Symptoms Sporadic symptoms; Activity/position dependent, pain (Location: anterior shoulder right shoulder, anterior left shin, extends to the front of the ankle but not into the foot, occasional left knee pain Quality: Sharp and Aching/Throbbing), stiffness  Right shoulder  Left shin, knee pain    Worst Pain 4-5/10 (with see below)   Symptom Provocators Walking: 10 minutes (20 minutes is the goal), laying on her right side with the left leg twisted over, kneeling on the left knee.     Shoulder: laying on her right side, cannot fall asleep on her right side like she prefers, some of the yoga exercises, reaching overhead.    Best Pain 0/10    Symptom Relievers Sitting down after walking  Activity modification   Medication   Time of day dependent? No   Recent symptom change? no change in symptoms     Prior Testing/Intervention for current condition:  Prior " "Tests None   Prior Treatment Treatment: PT exercises (no change)      Lifestyle & General Medical History:  Employment Retired   Usual physical activities  (within past year) Yoga 30 minutes, 5x/week, walking - doesn't in the winter.    Orthopaedic history See Epic Chart > left hip \"arthritis\"    Notable medical history See Epic Chart   Patient Reported Health good           Presenting condition or subjective complaint: leg and knee pain when walking and activity  Date of onset: 07/25/24    Relevant medical history: Arthritis   Dates & types of surgery:      Prior diagnostic imaging/testing results:       Prior therapy history for the same diagnosis, illness or injury: No      Living Environment  Social support: Alone   Type of home: Apartment/condo   Stairs to enter the home:         Ramp: Yes   Stairs inside the home: No       Help at home: None  Equipment owned:       Employment:      Hobbies/Interests:      Patient goals for therapy: walking exercise       Objective     Lumbar screen: no clear DOP. Loss of extension ROM. No change in sx with lumbar testing.   Negative slump. Negative SLR.     Ankle:   No loss of ROM in any direction.   TTP through anterior tib  Weakness through PF, DF, IV, EV.     Shoulder:   Pain through motion of FE and abduction. None with ER/IR.   Full motion all directions.   Pain with ER and abduction resisted testing.       Assessment & Plan   CLINICAL IMPRESSIONS  Medical Diagnosis: Right shoulder/Left Lower leg    Treatment Diagnosis: Left lower leg, right shoulder pain   Impression/Assessment: Patient is a 74 year old female with right shoulder, left lower leg complaints.  The following significant findings have been identified: Pain, Decreased ROM/flexibility, Decreased strength, Impaired balance, Decreased proprioception, Impaired gait, Impaired muscle performance, and Decreased activity tolerance. These impairments interfere with their ability to perform self care tasks, " recreational activities, household chores, and driving  as compared to previous level of function.     Clinical Decision Making (Complexity):  Clinical Presentation: Stable/Uncomplicated  Clinical Presentation Rationale: based on medical and personal factors listed in PT evaluation  Clinical Decision Making (Complexity): Low complexity    PLAN OF CARE  Treatment Interventions:  Interventions: Manual Therapy, Neuromuscular Re-education, Therapeutic Activity, Therapeutic Exercise    Long Term Goals     PT Goal 1  Goal Identifier: Return to walking  Goal Description: Patient to ambulate 30 minutes without onset of lower leg pain  Rationale: to maximize safety and independence within the community  Target Date: 04/26/25      Frequency of Treatment: 2x/month  Duration of Treatment: 3 months    Recommended Referrals to Other Professionals: Physical Therapy  Education Assessment:   Learner/Method: Patient;No Barriers to Learning    Risks and benefits of evaluation/treatment have been explained.   Patient/Family/caregiver agrees with Plan of Care.     Evaluation Time:     PT Eval, Low Complexity Minutes (04566): 19     Signing Clinician: Marie Jimenes, PT        [facility]                                                                                   OUTPATIENT PHYSICAL THERAPY      PLAN OF TREATMENT FOR OUTPATIENT REHABILITATION   Patient's Last Name, First Name, JAYBOOKER  LonaJessica  ANASTACIA YOB: 1950   Provider's Name   [facility]   Medical Record No.  [id number]     Onset Date: 07/25/24  Start of Care Date: 01/27/25     Medical Diagnosis:  Right shoulder/Left Lower leg      PT Treatment Diagnosis:  Left lower leg, right shoulder pain Plan of Treatment  Frequency/Duration: 2x/month/ 3 months    Certification date from 01/27/25 to 04/26/25         See note for plan of treatment details and functional goals     Marie Jimenes, PT                          I CERTIFY THE NEED FOR THESE SERVICES FURNISHED UNDER        THIS PLAN OF TREATMENT AND WHILE UNDER MY CARE     (Physician attestation of this document indicates review and certification of the therapy plan).              Referring Provider:  Referred Self    Initial Assessment  See Epic Evaluation- Start of Care Date: 01/27/25

## 2025-03-11 ENCOUNTER — NURSE TRIAGE (OUTPATIENT)
Dept: FAMILY MEDICINE | Facility: CLINIC | Age: 75
End: 2025-03-11
Payer: COMMERCIAL

## 2025-03-11 NOTE — TELEPHONE ENCOUNTER
"Pt found a \"surface boil\" near the crease of her groin last night  Slightly red, and upon palpation pt notes a small lump just smaller than the size of a dime  Denies pain, but notes mild tenderness with palpation  Afebrile, no drainage, no warmth    Patient scheduled to see PCP 3/13 and home care advice reviewed. Patient will call back or be seen sooner with new or worsening symptoms.    KAYLAH Mendoza BSN, PHN, AMB-BC (she/her)  [facility] RN      Reason for Disposition   Patient wants to be seen    Additional Information   Negative: Small growth, spot, bump, or pigmented area of skin (e.g., moles, skin tags, wart, melanoma, skin cancer)   Negative: Inguinal hernia previously diagnosed by a doctor (or NP/PA)   Negative: Followed a skin injury   Negative: Followed an insect bite and has localized swelling (e.g., small area of puffy or swollen skin)   Negative: Swelling of ankle joint   Negative: Swelling of entire face   Negative: Swelling of eyelid   Negative: Swelling of knee joint   Negative: Swelling of labia   Negative: Swelling of lymph node suspected   Negative: Swelling of rectum   Negative: Swelling of scrotum   Negative: Swelling of surgical incision   Negative: Swelling of vaccination site   Negative: Hernia suspected (bulge in groin or abdomen) and painful or vomiting   Negative: Swollen lump in groin and pulsating (like heartbeat)   Negative: Patient sounds very sick or weak to the triager   Negative: Swelling is painful to touch and no fever   Negative: Looks like a boil, infected sore, deep ulcer or other infected rash   Negative: SEVERE pain (e.g., excruciating)   Negative: Swelling is painful to touch and fever   Negative: Swelling is red and fever   Negative: Swelling is red and size > 2 inches (5.0 cm)   Negative: Small swelling or lump present > 1 week   Negative: New-onset hernia suspected (reducible bulge in groin or abdomen; non-tender) and NO pain or " vomiting    Protocols used: Skin Lump or Localized Swelling-A-OH

## 2025-03-11 NOTE — TELEPHONE ENCOUNTER
Reason for Call:  Appointment Request    Patient requesting this type of appt:  Boil found in groin area    Requested provider: Nayely Law    Reason patient unable to be scheduled: Not within requested timeframe    When does patient want to be seen/preferred time: 1-2 days    Comments: Just concerned    Okay to leave a detailed message?: Yes at Cell number on file:    Telephone Information:   Mobile [phone]       Call taken on 3/11/2025 at 10:24 AM by Aditi Worrell

## 2025-03-13 ENCOUNTER — OFFICE VISIT (OUTPATIENT)
Dept: FAMILY MEDICINE | Facility: CLINIC | Age: 75
End: 2025-03-13
Payer: COMMERCIAL

## 2025-03-13 VITALS
OXYGEN SATURATION: 96 % | RESPIRATION RATE: 16 BRPM | SYSTOLIC BLOOD PRESSURE: 190 MMHG | TEMPERATURE: 98.3 F | HEART RATE: 111 BPM | DIASTOLIC BLOOD PRESSURE: 78 MMHG

## 2025-03-13 DIAGNOSIS — M79.662 PAIN IN LEFT SHIN: ICD-10-CM

## 2025-03-13 DIAGNOSIS — L73.9 FOLLICULITIS: ICD-10-CM

## 2025-03-13 DIAGNOSIS — I10 PRIMARY HYPERTENSION: Primary | ICD-10-CM

## 2025-03-13 ASSESSMENT — PAIN SCALES - GENERAL: PAINLEVEL_OUTOF10: NO PAIN (0)

## 2025-03-13 NOTE — PROGRESS NOTES
Assessment & Plan     Primary hypertension  BP continues to be elevated in clinic today with both readings,190/91 and 190/78. Patient continues to be hesitant to medication recommendations due to side effects that she is experiencing. Recently stopped taking amlodipine because of side effects that she reports increased flatulence and fatigue of concern. Restarted medication 3 days ago. Is not checking BP at home but does have a wrist cuff. We have tried lisinopril and losartan in the past and patient has had side effects to both of these medications as well. I offered to refer her to cardiology to assist with treatment options given concern for side effects with multiple medications. Patient is not interested in this recommendation at this time. I again asked she send in home readings as there certainly could be some component of white coat syndrome.  We discussed this at our last visit and she did not follow up.  She states she will take home readings and follow up via [facility].  Pending the readings will offer her referral to cardiology to discuss medication options as we have already experienced side effects with all classes of first-line agents.      Pain in left shin  Patient mentions today that she is experiencing pain the left shin that is interfering with walking. Has tried PT sessions with no improvement in symptoms. Orthopedic referral placed today.   - Orthopedic  Referral    Folliculitis  Suspect the erythematous area in the pubic area is secondary to folliculitis . Able to palpate a small cyst-like structure underneath the erythematous site.  Does not appear infected and will likely resolve without intervention. Advised heat to the area whether this be via hot pack and/or tub soaking and keeping the area clean with bacitracin and covered with gauze to avoid further irritation. Follow up if symptoms worsen.        45 minutes spent by me on the date of the encounter doing chart review, history  and exam, documentation and further activities per the note    The longitudinal plan of care for the diagnosis(es)/condition(s) as documented were addressed during this visit. Due to the added complexity in care, I will continue to support Jessica in the subsequent management and with ongoing continuity of care.    Rowdy Lopez is a 74 year old, presenting for the following health issues:  Mass (On pubic area.)      3/13/2025     1:39 PM   Additional Questions   Roomed by Tiffanie LEWIS         3/13/2025   Declines Weight   Did patient decline having their weight taken? Yes     Mass    History of Present Illness       Reason for visit:  Red spot in pubic area.  Symptom onset:  1-3 days ago  Symptoms include:  No pain, just some tenderness.  Symptom intensity:  Mild  Symptom progression:  Staying the same  Had these symptoms before:  No She is missing 1 dose(s) of medications per week.  She is not taking prescribed medications regularly due to other.      Started 2-3 nights ago that she first noticed it, redness on the skin with a lump felt underneath the skin, feels like a dull ache, not warm to the touch, no itching,     BP: not checking at home, amlodipine restarted 3 days ago, patient took herself off of medications for two weeks previously because fatigue, flatulence- improved flatulence since being off medication  Historically had tried lisinopril and had dry cough losartan and felt that she had bugs crawling all over her and abdominal pains       Objective    BP (!) 190/91   Pulse 111   Temp 98.3  F (36.8  C) (Temporal)   Resp 16   SpO2 96%   There is no height or weight on file to calculate BMI.  Physical Exam   GENERAL: alert and no distress  CV: regular rate and rhythm, normal S1 S2, no S3 or S4, no murmur, click or rub, no peripheral edema  SKIN: erythematous hair follicle noted to the right side of the mons pubis with small cyst-like structure palpable below the skin.  No evidence of cellulitis or  underlying infection.        Note and visit completed by Hiral MILIANS.     Physician Attestation   I, Nayely Law DNP, was present with the medical/MIKIE student who participated in the service and in the documentation of the note.  I have verified the history and personally performed the physical exam and medical decision making.  I agree with the assessment and plan of care as documented in the note.          Signed Electronically by: Nayely Law DNP

## 2025-03-17 ENCOUNTER — PATIENT OUTREACH (OUTPATIENT)
Dept: CARE COORDINATION | Facility: CLINIC | Age: 75
End: 2025-03-17
Payer: COMMERCIAL

## 2025-03-27 DIAGNOSIS — I10 PRIMARY HYPERTENSION: ICD-10-CM

## 2025-03-27 RX ORDER — AMLODIPINE BESYLATE 5 MG/1
5 TABLET ORAL
Qty: 90 TABLET | Refills: 0 | Status: SHIPPED | OUTPATIENT
Start: 2025-03-27

## 2025-03-27 NOTE — TELEPHONE ENCOUNTER
Last 3 BP out of range, PCP must authorize.  BP Readings from Last 3 Encounters:   03/13/25 (!) 190/78   10/28/24 (!) 152/78   07/25/24 (!) 151/76     Slime Kevin RN  [facility]

## 2025-04-03 ENCOUNTER — OFFICE VISIT (OUTPATIENT)
Dept: DERMATOLOGY | Facility: CLINIC | Age: 75
End: 2025-04-03
Payer: COMMERCIAL

## 2025-04-03 DIAGNOSIS — D49.2 NEOPLASM OF UNSPECIFIED BEHAVIOR OF BONE, SOFT TISSUE, AND SKIN: ICD-10-CM

## 2025-04-03 DIAGNOSIS — L82.0 SEBORRHEIC KERATOSES, INFLAMED: ICD-10-CM

## 2025-04-03 DIAGNOSIS — L30.4 INTERTRIGO: Primary | ICD-10-CM

## 2025-04-03 PROCEDURE — 88305 TISSUE EXAM BY PATHOLOGIST: CPT | Mod: TC | Performed by: DERMATOLOGY

## 2025-04-03 PROCEDURE — 11102 TANGNTL BX SKIN SINGLE LES: CPT | Mod: XS | Performed by: DERMATOLOGY

## 2025-04-03 PROCEDURE — 17110 DESTRUCTION B9 LES UP TO 14: CPT | Performed by: DERMATOLOGY

## 2025-04-03 PROCEDURE — 99203 OFFICE O/P NEW LOW 30 MIN: CPT | Mod: 25 | Performed by: DERMATOLOGY

## 2025-04-03 PROCEDURE — 1126F AMNT PAIN NOTED NONE PRSNT: CPT | Performed by: DERMATOLOGY

## 2025-04-03 RX ORDER — DESONIDE 0.5 MG/G
OINTMENT TOPICAL 2 TIMES DAILY
Qty: 60 G | Refills: 1 | Status: SHIPPED | OUTPATIENT
Start: 2025-04-03 | End: 2025-05-02

## 2025-04-03 RX ORDER — ECONAZOLE NITRATE 10 MG/G
CREAM TOPICAL
Qty: 85 G | Refills: 3 | Status: SHIPPED | OUTPATIENT
Start: 2025-04-03

## 2025-04-03 ASSESSMENT — PAIN SCALES - GENERAL: PAINLEVEL_OUTOF10: NO PAIN (0)

## 2025-04-03 NOTE — NURSING NOTE
Dermatology Rooming Note    Jessica Zamorano's goals for this visit include:   Chief Complaint   Patient presents with    Derm Problem     Skin tag on L side, L axilla. Thinks she has an allergic rash under breast and waistline of pants, typically treats with hydrocortisone which helps, seems to be exacerbated by sweat.      Kristen Veliz CA

## 2025-04-03 NOTE — PROGRESS NOTES
Dermatology New patient visit  April 3, 2025    Assessment and plan:  1.  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  Favor traumatized seborrheic keratosis, rule out atypical nevus.  Shave biopsy was performed today.  Please see the procedure note below.  2.  Inflamed seborrheic keratoses of the back and inflamed acrochordons of the left axilla.  After informed verbal consent, a total of 6 lesions were treated with liquid nitrogen times 10 seconds x 2 cycles.  The patient tolerated this without complication.  3.  Intertrigo/irritant dermatitis of the inframammary areas and waistline.  The patient is also concerned about the possibility of allergic contact dermatitis to latex, and this remains a possibility, though the appearance is more typical of intertrigo.  - We gave her a prescription for econazole cream to be applied twice daily to the affected areas.  - If she does not have significant improvement with econazole, we also gave her a prescription for desonide ointment to apply twice daily after a several week trial of the econazole    She will follow-up in our clinic in 6 months time.    Procedure note:  After informed verbal consent, the affected area was swabbed with an alcohol pad and injected with 1% lidocaine with epinephrine.  A biopsy via shave technique was performed and sent for histopathology.  Hemostasis was achieved with aluminum chloride 20% in the defect was covered of petrolatum and bandage.  The patient tolerated this without complication.    Kenny Coley MD  Dermatology Attending    __________________________________________________________________    Chief complaint: Painful bump along bra line, and recurring rash    History of present illness:  Jessica is a very pleasant 74-year-old female from [place] who is a new patient to our clinic today, presenting with several concerns, including a painful bump along her broad line on the left flank, and a recurring rash of the waistline and  inframammary areas.  Regarding the latter, she is wondering if she may be allergic to latex, as the rash primarily appears along the upper portion of her panty line.  Hydrocortisone 2.5% will give some improvement of the rash persists.  She also has several itchy bumps that she would like treated in her left armpit and on her back.    Physical Exam:  Gen Well appearing, NAD  Skin Exam of head, neck, bilateral upper extremities, flanks, inframammary skin  Tan 4mm papule of left flank  Pink-brown waxy stuck on papules on back  Pink patches inframammary skin and waistline

## 2025-04-03 NOTE — PROGRESS NOTES
Lidocaine-epinephrine 1-1:072717 % injection   0.5mL once for one use, starting 4/3/2025 ending 4/3/2025,  2mL disp, R-0, injection  Injected by KRISTIN Laurent

## 2025-04-03 NOTE — LETTER
4/3/2025       RE: Jessica Zamorano  [address]  [facility] [address]     Dear Colleague,    Thank you for referring your patient, Jessica Zamorano, to the [facility] [place] [facility]. Please see a copy of my visit note below.    Dermatology New patient visit  April 3, 2025    Assessment and plan:  1.  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  Favor traumatized seborrheic keratosis, rule out atypical nevus.  Shave biopsy was performed today.  Please see the procedure note below.  2.  Inflamed seborrheic keratoses of the back and inflamed acrochordons of the left axilla.  After informed verbal consent, a total of 6 lesions were treated with liquid nitrogen times 10 seconds x 2 cycles.  The patient tolerated this without complication.  3.  Intertrigo/irritant dermatitis of the inframammary areas and waistline.  The patient is also concerned about the possibility of allergic contact dermatitis to latex, and this remains a possibility, though the appearance is more typical of intertrigo.  - We gave her a prescription for econazole cream to be applied twice daily to the affected areas.  - If she does not have significant improvement with econazole, we also gave her a prescription for desonide ointment to apply twice daily after a several week trial of the econazole    She will follow-up in our clinic in 6 months time.    Procedure note:  After informed verbal consent, the affected area was swabbed with an alcohol pad and injected with 1% lidocaine with epinephrine.  A biopsy via shave technique was performed and sent for histopathology.  Hemostasis was achieved with aluminum chloride 20% in the defect was covered of petrolatum and bandage.  The patient tolerated this without complication.    Kenny Coley MD  Dermatology Attending    __________________________________________________________________    Chief complaint:  Painful bump along bra line, and recurring rash    History of present illness:  Jessica is a very pleasant 74-year-old female from [place] who is a new patient to our clinic today, presenting with several concerns, including a painful bump along her broad line on the left flank, and a recurring rash of the waistline and inframammary areas.  Regarding the latter, she is wondering if she may be allergic to latex, as the rash primarily appears along the upper portion of her panty line.  Hydrocortisone 2.5% will give some improvement of the rash persists.  She also has several itchy bumps that she would like treated in her left armpit and on her back.    Physical Exam:  Gen Well appearing, NAD  Skin Exam of head, neck, bilateral upper extremities, flanks, inframammary skin  Tan 4mm papule of left flank  Pink-brown waxy stuck on papules on back  Pink patches inframammary skin and waistline    Lidocaine-epinephrine 1-1:524293 % injection   0.5mL once for one use, starting 4/3/2025 ending 4/3/2025,  2mL disp, R-0, injection  Injected by KRISTIN Laurent      Again, thank you for allowing me to participate in the care of your patient.      Sincerely,    Kenny Coley MD

## 2025-05-04 PROBLEM — L82.0 SEBORRHEIC KERATOSES, INFLAMED: Status: ACTIVE | Noted: 2025-05-04

## 2025-05-04 PROBLEM — L30.4 INTERTRIGO: Status: ACTIVE | Noted: 2025-05-04

## 2025-05-04 PROBLEM — D49.2 NEOPLASM OF UNSPECIFIED BEHAVIOR OF BONE, SOFT TISSUE, AND SKIN: Status: ACTIVE | Noted: 2025-05-04

## 2025-05-05 LAB
PATH REPORT.COMMENTS IMP SPEC: NORMAL
PATH REPORT.COMMENTS IMP SPEC: NORMAL
PATH REPORT.FINAL DX SPEC: NORMAL
PATH REPORT.GROSS SPEC: NORMAL
PATH REPORT.MICROSCOPIC SPEC OTHER STN: NORMAL
PATH REPORT.RELEVANT HX SPEC: NORMAL

## 2025-05-07 ENCOUNTER — TELEPHONE (OUTPATIENT)
Dept: DERMATOLOGY | Facility: CLINIC | Age: 75
End: 2025-05-07
Payer: COMMERCIAL

## 2025-05-07 NOTE — TELEPHONE ENCOUNTER
5/7 SWP   Pt did not want to schedule at this time.  Provided pt with the dermatology callback number.   Pt said they will call back when available.

## 2025-05-27 ENCOUNTER — RESULTS FOLLOW-UP (OUTPATIENT)
Dept: LAB | Facility: CLINIC | Age: 75
End: 2025-05-27

## 2025-06-25 ENCOUNTER — PATIENT OUTREACH (OUTPATIENT)
Dept: CARE COORDINATION | Facility: CLINIC | Age: 75
End: 2025-06-25
Payer: COMMERCIAL

## 2025-07-09 ENCOUNTER — PATIENT OUTREACH (OUTPATIENT)
Dept: CARE COORDINATION | Facility: CLINIC | Age: 75
End: 2025-07-09
Payer: COMMERCIAL

## 2025-07-11 ENCOUNTER — TELEPHONE (OUTPATIENT)
Dept: FAMILY MEDICINE | Facility: CLINIC | Age: 75
End: 2025-07-11
Payer: COMMERCIAL

## 2025-07-11 NOTE — TELEPHONE ENCOUNTER
Call attempt 1/3.    LVM for patient to schedule annual wellness exam due July. On callback, please assist patient in scheduling Medicare AWV.    Please close encounter when scheduled.

## 2025-08-04 DIAGNOSIS — I10 PRIMARY HYPERTENSION: ICD-10-CM

## 2025-08-04 RX ORDER — AMLODIPINE BESYLATE 5 MG/1
5 TABLET ORAL DAILY
Qty: 30 TABLET | Refills: 0 | Status: SHIPPED | OUTPATIENT
Start: 2025-08-04

## 2025-08-18 ENCOUNTER — OFFICE VISIT (OUTPATIENT)
Dept: OBGYN | Facility: CLINIC | Age: 75
End: 2025-08-18
Payer: COMMERCIAL

## 2025-08-18 VITALS — WEIGHT: 136.7 LBS | BODY MASS INDEX: 26.08 KG/M2

## 2025-08-18 DIAGNOSIS — L90.0 LICHEN SCLEROSUS: ICD-10-CM

## 2025-08-18 DIAGNOSIS — N95.2 VAGINAL ATROPHY: Primary | ICD-10-CM

## 2025-08-18 PROCEDURE — 99214 OFFICE O/P EST MOD 30 MIN: CPT | Performed by: OBSTETRICS & GYNECOLOGY

## 2025-08-18 RX ORDER — ESTRADIOL 0.1 MG/G
CREAM VAGINAL
Qty: 42.5 G | Refills: 3 | Status: SHIPPED | OUTPATIENT
Start: 2025-08-18

## (undated) DEVICE — Device

## (undated) DEVICE — KIT INTRODUCER FLUENT MICRO 5FRX10CM ECHO TIP KIT-038-04

## (undated) DEVICE — PACK CENTRAL LINE INSERTION SAN32CLFCG

## (undated) DEVICE — SU VICRYL 4-0 P-3 18" UND  J494H

## (undated) DEVICE — ADH SKIN CLOSURE PREMIERPRO EXOFIN 1.0ML 3470

## (undated) DEVICE — SU MONOCRYL 4-0 P-3 18" UND Y494G

## (undated) DEVICE — DECANTER BAG 2002S

## (undated) DEVICE — LINEN GOWN XLG 5407

## (undated) DEVICE — KNIFE HANDLE W/15 BLADE 371615

## (undated) DEVICE — LINEN TOWEL PACK X5 5464

## (undated) DEVICE — SOL NACL 0.9% INJ 250ML BAG 2B1322Q

## (undated) DEVICE — GLOVE PROTEXIS POWDER FREE SMT 8.0  2D72PT80X

## (undated) DEVICE — CONNECTOR MALE TO MALE LL

## (undated) DEVICE — GOWN XLG DISP 9545

## (undated) DEVICE — COVER ULTRASOUND PROBE W/GEL FLEXI-FEEL 6"X58" LF  25-FF658

## (undated) RX ORDER — CEFAZOLIN SODIUM 2 G/50ML
SOLUTION INTRAVENOUS
Status: DISPENSED
Start: 2022-08-12

## (undated) RX ORDER — ACETAMINOPHEN 325 MG/1
TABLET ORAL
Status: DISPENSED
Start: 2022-10-17

## (undated) RX ORDER — ERYTHROMYCIN 5 MG/G
OINTMENT OPHTHALMIC
Status: DISPENSED
Start: 2022-03-22

## (undated) RX ORDER — BUPIVACAINE HYDROCHLORIDE 2.5 MG/ML
INJECTION, SOLUTION EPIDURAL; INFILTRATION; INTRACAUDAL
Status: DISPENSED
Start: 2022-10-17